# Patient Record
Sex: FEMALE | Race: WHITE | NOT HISPANIC OR LATINO | Employment: OTHER | URBAN - METROPOLITAN AREA
[De-identification: names, ages, dates, MRNs, and addresses within clinical notes are randomized per-mention and may not be internally consistent; named-entity substitution may affect disease eponyms.]

---

## 2017-02-22 ENCOUNTER — ALLSCRIPTS OFFICE VISIT (OUTPATIENT)
Dept: OTHER | Facility: OTHER | Age: 82
End: 2017-02-22

## 2017-02-22 DIAGNOSIS — H91.90 HEARING LOSS: ICD-10-CM

## 2017-04-05 ENCOUNTER — ALLSCRIPTS OFFICE VISIT (OUTPATIENT)
Dept: OTHER | Facility: OTHER | Age: 82
End: 2017-04-05

## 2017-10-02 ENCOUNTER — HOSPITAL ENCOUNTER (INPATIENT)
Facility: HOSPITAL | Age: 82
LOS: 4 days | Discharge: NURSING, REHAB OR LONG-TERM CARE | DRG: 641 | End: 2017-10-06
Attending: EMERGENCY MEDICINE | Admitting: FAMILY MEDICINE
Payer: MEDICARE

## 2017-10-02 ENCOUNTER — APPOINTMENT (EMERGENCY)
Dept: CT IMAGING | Facility: HOSPITAL | Age: 82
DRG: 641 | End: 2017-10-02
Payer: MEDICARE

## 2017-10-02 DIAGNOSIS — R19.7 DIARRHEA: Primary | ICD-10-CM

## 2017-10-02 PROBLEM — F17.200 TOBACCO DEPENDENCE: Chronic | Status: ACTIVE | Noted: 2017-10-02

## 2017-10-02 LAB
ALBUMIN SERPL BCP-MCNC: 3.1 G/DL (ref 3.5–5)
ALP SERPL-CCNC: 51 U/L (ref 46–116)
ALT SERPL W P-5'-P-CCNC: 18 U/L (ref 12–78)
ANION GAP SERPL CALCULATED.3IONS-SCNC: 8 MMOL/L (ref 4–13)
AST SERPL W P-5'-P-CCNC: 19 U/L (ref 5–45)
BASOPHILS # BLD AUTO: 0.06 THOUSANDS/ΜL (ref 0–0.1)
BASOPHILS NFR BLD AUTO: 1 % (ref 0–1)
BILIRUB SERPL-MCNC: 0.4 MG/DL (ref 0.2–1)
BUN SERPL-MCNC: 20 MG/DL (ref 5–25)
CALCIUM SERPL-MCNC: 8.7 MG/DL (ref 8.3–10.1)
CHLORIDE SERPL-SCNC: 103 MMOL/L (ref 100–108)
CO2 SERPL-SCNC: 29 MMOL/L (ref 21–32)
CREAT SERPL-MCNC: 1.04 MG/DL (ref 0.6–1.3)
EOSINOPHIL # BLD AUTO: 0.04 THOUSAND/ΜL (ref 0–0.61)
EOSINOPHIL NFR BLD AUTO: 1 % (ref 0–6)
ERYTHROCYTE [DISTWIDTH] IN BLOOD BY AUTOMATED COUNT: 15.4 % (ref 11.6–15.1)
GFR SERPL CREATININE-BSD FRML MDRD: 48 ML/MIN/1.73SQ M
GLUCOSE SERPL-MCNC: 94 MG/DL (ref 65–140)
HCT VFR BLD AUTO: 46.9 % (ref 34.8–46.1)
HGB BLD-MCNC: 15 G/DL (ref 11.5–15.4)
L PNEUMO1 AG UR QL IA.RAPID: NEGATIVE
LYMPHOCYTES # BLD AUTO: 0.85 THOUSANDS/ΜL (ref 0.6–4.47)
LYMPHOCYTES NFR BLD AUTO: 11 % (ref 14–44)
MCH RBC QN AUTO: 31.5 PG (ref 26.8–34.3)
MCHC RBC AUTO-ENTMCNC: 32 G/DL (ref 31.4–37.4)
MCV RBC AUTO: 99 FL (ref 82–98)
MONOCYTES # BLD AUTO: 0.52 THOUSAND/ΜL (ref 0.17–1.22)
MONOCYTES NFR BLD AUTO: 7 % (ref 4–12)
NEUTROPHILS # BLD AUTO: 6.39 THOUSANDS/ΜL (ref 1.85–7.62)
NEUTS SEG NFR BLD AUTO: 81 % (ref 43–75)
NRBC BLD AUTO-RTO: 0 /100 WBCS
PLATELET # BLD AUTO: 295 THOUSANDS/UL (ref 149–390)
PLATELET # BLD AUTO: 307 THOUSANDS/UL (ref 149–390)
PMV BLD AUTO: 10 FL (ref 8.9–12.7)
PMV BLD AUTO: 9.7 FL (ref 8.9–12.7)
POTASSIUM SERPL-SCNC: 4.5 MMOL/L (ref 3.5–5.3)
PROT SERPL-MCNC: 7.1 G/DL (ref 6.4–8.2)
RBC # BLD AUTO: 4.76 MILLION/UL (ref 3.81–5.12)
SODIUM SERPL-SCNC: 140 MMOL/L (ref 136–145)
WBC # BLD AUTO: 7.9 THOUSAND/UL (ref 4.31–10.16)

## 2017-10-02 PROCEDURE — 85025 COMPLETE CBC W/AUTO DIFF WBC: CPT | Performed by: PHYSICIAN ASSISTANT

## 2017-10-02 PROCEDURE — 93005 ELECTROCARDIOGRAM TRACING: CPT | Performed by: PHYSICIAN ASSISTANT

## 2017-10-02 PROCEDURE — 87505 NFCT AGENT DETECTION GI: CPT | Performed by: PHYSICIAN ASSISTANT

## 2017-10-02 PROCEDURE — 80053 COMPREHEN METABOLIC PANEL: CPT | Performed by: PHYSICIAN ASSISTANT

## 2017-10-02 PROCEDURE — 87449 NOS EACH ORGANISM AG IA: CPT | Performed by: PHYSICIAN ASSISTANT

## 2017-10-02 PROCEDURE — 96361 HYDRATE IV INFUSION ADD-ON: CPT

## 2017-10-02 PROCEDURE — 36415 COLL VENOUS BLD VENIPUNCTURE: CPT | Performed by: PHYSICIAN ASSISTANT

## 2017-10-02 PROCEDURE — 96360 HYDRATION IV INFUSION INIT: CPT

## 2017-10-02 PROCEDURE — 85049 AUTOMATED PLATELET COUNT: CPT | Performed by: PHYSICIAN ASSISTANT

## 2017-10-02 PROCEDURE — 74177 CT ABD & PELVIS W/CONTRAST: CPT

## 2017-10-02 RX ORDER — ACETAMINOPHEN 325 MG/1
650 TABLET ORAL EVERY 6 HOURS PRN
Status: DISCONTINUED | OUTPATIENT
Start: 2017-10-02 | End: 2017-10-06 | Stop reason: HOSPADM

## 2017-10-02 RX ORDER — CALCIUM CARBONATE 200(500)MG
1000 TABLET,CHEWABLE ORAL DAILY PRN
Status: DISCONTINUED | OUTPATIENT
Start: 2017-10-02 | End: 2017-10-06 | Stop reason: HOSPADM

## 2017-10-02 RX ORDER — DOCUSATE SODIUM 100 MG/1
100 CAPSULE, LIQUID FILLED ORAL 2 TIMES DAILY
Status: DISCONTINUED | OUTPATIENT
Start: 2017-10-02 | End: 2017-10-06 | Stop reason: HOSPADM

## 2017-10-02 RX ORDER — ONDANSETRON 2 MG/ML
4 INJECTION INTRAMUSCULAR; INTRAVENOUS EVERY 6 HOURS PRN
Status: DISCONTINUED | OUTPATIENT
Start: 2017-10-02 | End: 2017-10-06 | Stop reason: HOSPADM

## 2017-10-02 RX ORDER — SODIUM CHLORIDE 9 MG/ML
75 INJECTION, SOLUTION INTRAVENOUS CONTINUOUS
Status: DISCONTINUED | OUTPATIENT
Start: 2017-10-02 | End: 2017-10-06 | Stop reason: HOSPADM

## 2017-10-02 RX ADMIN — IOHEXOL 85 ML: 350 INJECTION, SOLUTION INTRAVENOUS at 17:38

## 2017-10-02 RX ADMIN — SODIUM CHLORIDE 75 ML/HR: 0.9 INJECTION, SOLUTION INTRAVENOUS at 23:22

## 2017-10-02 RX ADMIN — AZITHROMYCIN MONOHYDRATE 500 MG: 500 INJECTION, POWDER, LYOPHILIZED, FOR SOLUTION INTRAVENOUS at 19:03

## 2017-10-02 RX ADMIN — SODIUM CHLORIDE 500 ML: 0.9 INJECTION, SOLUTION INTRAVENOUS at 15:39

## 2017-10-02 NOTE — ED PROVIDER NOTES
History  Chief Complaint   Patient presents with    Diarrhea     Patient reports rapid bowel movements after eating, has progressively gotten worse over the weekend, patient states "it is as if I have no stomach, it goes right through me"     26-year-old female with past medical history significant for dementia and history of ovarian cyst presents to the emergency department with chief complaint of diarrhea  Onset of symptoms reportedly worse over the past 4 days but has been ongoing for the past 4 weeks intermittently  Quality is reported as watery diarrhea  Severity is reported as moderate  Associated symptoms:  Denies fevers  Denies abdominal pain  Denies nausea or vomiting  Denies melena  Modifying factors:  Patient reports eating exacerbate symptoms  Context:  Denies any recent sick contacts  Denies any recent travel outside the country  Denies any recent changes to her baseline medications  Patient's daughter reports that patient has had intermittent type symptoms for at least the past 4 weeks if not longer  She reports they have gotten worse over the past 4 days, specifically stating that patient now cannot get through a meal without having to immediately go to the bathroom at which time she experiences watery nonbloody diarrhea  Daughter reports patient had colonoscopy 5-6 years ago which showed some polyps - but patient also had complications with the colonoscopy and needed to spend 2 months in nursing home during which time she contracted c-dif infection  Daughter reports that symptoms are different than prior episode of cdif  Denies recent antibiotic use  She reports PCP prescribed an over the counter probiotic with no improvement in symptoms  Medical summary:  Review of past visit history via [employer] demonstrates the patient was last seen on April 5, 2017 by her PCP Dr Santiago Adjutant for routine health maintenance visit regarding hypertension, hearing loss and dementia   Details of HPI and ROS obtained from patient's daughter at bedside due to dementia  History provided by:  Patient and relative  History limited by:  Dementia   used: No    Diarrhea   Associated symptoms: no abdominal pain, no arthralgias, no chills, no diaphoresis, no fever, no headaches, no myalgias and no vomiting        None       Past Medical History:   Diagnosis Date    Ovarian cyst        Past Surgical History:   Procedure Laterality Date    COLONOSCOPY         History reviewed  No pertinent family history  I have reviewed and agree with the history as documented  Social History   Substance Use Topics    Smoking status: Current Every Day Smoker     Packs/day: 1 00     Types: Cigarettes    Smokeless tobacco: Never Used    Alcohol use No        Review of Systems   Constitutional: Negative for activity change, appetite change, chills, diaphoresis, fatigue and fever  HENT: Negative for congestion, dental problem, ear pain, facial swelling, nosebleeds, postnasal drip, rhinorrhea, sinus pressure, sneezing, sore throat and trouble swallowing  Eyes: Negative for photophobia, pain, discharge, redness and itching  Respiratory: Negative for cough, chest tightness, shortness of breath and wheezing  Cardiovascular: Negative for chest pain, palpitations and leg swelling  Gastrointestinal: Positive for diarrhea  Negative for abdominal distention, abdominal pain, anal bleeding, blood in stool, constipation, nausea, rectal pain and vomiting  Endocrine: Negative for cold intolerance, heat intolerance, polydipsia, polyphagia and polyuria  Genitourinary: Negative for decreased urine volume, difficulty urinating, dysuria, flank pain, frequency, hematuria and urgency  Musculoskeletal: Negative for arthralgias, back pain, gait problem, joint swelling, myalgias, neck pain and neck stiffness  Skin: Negative for color change, pallor, rash and wound     Allergic/Immunologic: Negative for environmental allergies, food allergies and immunocompromised state  Neurological: Negative for dizziness, tremors, seizures, syncope, facial asymmetry, speech difficulty, weakness, light-headedness, numbness and headaches  Hematological: Negative for adenopathy  Does not bruise/bleed easily  Psychiatric/Behavioral: Negative for agitation, decreased concentration and hallucinations  All other systems reviewed and are negative  Physical Exam  ED Triage Vitals [10/02/17 1418]   Temperature Pulse Respirations Blood Pressure SpO2   97 7 °F (36 5 °C) 80 18 103/59 97 %      Temp Source Heart Rate Source Patient Position - Orthostatic VS BP Location FiO2 (%)   Oral Monitor Sitting Left arm --      Pain Score       No Pain           Physical Exam   Constitutional: She is oriented to person, place, and time  She appears well-developed and well-nourished  No distress  /59   Pulse 80   Temp 97 7 °F (36 5 °C) (Oral)   Resp 18   Ht 5' (1 524 m)   Wt 34 kg (75 lb)   SpO2 97%   BMI 14 65 kg/m²   Interpretation normal, patient observed   HENT:   Head: Normocephalic and atraumatic  Right Ear: External ear normal    Left Ear: External ear normal    Nose: Nose normal    Mouth/Throat: Oropharynx is clear and moist  No oropharyngeal exudate  Eyes: Conjunctivae and EOM are normal  Right eye exhibits no discharge  Left eye exhibits no discharge  No scleral icterus  Neck: Normal range of motion  Neck supple  No JVD present  No tracheal deviation present  Cardiovascular: Normal rate, regular rhythm and intact distal pulses  Pulmonary/Chest: Effort normal and breath sounds normal  No respiratory distress  She has no wheezes  She exhibits no tenderness  Abdominal: Soft  Bowel sounds are normal  She exhibits no distension and no mass  There is no tenderness  There is no rebound and no guarding  No hernia  Musculoskeletal: Normal range of motion  She exhibits no edema, tenderness or deformity     Lymphadenopathy: She has no cervical adenopathy  Neurological: She is alert and oriented to person, place, and time  No cranial nerve deficit  She exhibits normal muscle tone  Coordination normal    Skin: Skin is warm and dry  Capillary refill takes less than 2 seconds  No rash noted  She is not diaphoretic  No erythema  No pallor  Psychiatric: She has a normal mood and affect  Her behavior is normal    Nursing note and vitals reviewed        ED Medications  Medications   sodium chloride 0 9 % infusion (75 mL/hr Intravenous Restarted 10/2/17 2343)   docusate sodium (COLACE) capsule 100 mg (100 mg Oral Not Given 10/2/17 2330)   ondansetron (ZOFRAN) injection 4 mg (not administered)   calcium carbonate (TUMS) chewable tablet 1,000 mg (not administered)   enoxaparin (LOVENOX) subcutaneous injection 30 mg (not administered)   acetaminophen (TYLENOL) tablet 650 mg (not administered)   azithromycin (ZITHROMAX) 500 mg in sodium chloride 0 9% 250mL IVPB 500 mg (not administered)   sodium chloride 0 9 % bolus 500 mL (0 mL Intravenous Stopped 10/2/17 1728)   iohexol (OMNIPAQUE) 350 MG/ML injection (MULTI-DOSE) 100 mL (85 mL Intravenous Given 10/2/17 1738)   azithromycin (ZITHROMAX) 500 mg in sodium chloride 0 9% 250mL IVPB 500 mg (0 mg Intravenous Stopped 10/2/17 2032)       Diagnostic Studies  Labs Reviewed   CBC AND DIFFERENTIAL - Abnormal        Result Value Ref Range Status    Hematocrit 46 9 (*) 34 8 - 46 1 % Final    MCV 99 (*) 82 - 98 fL Final    RDW 15 4 (*) 11 6 - 15 1 % Final    Neutrophils Relative 81 (*) 43 - 75 % Final    Lymphocytes Relative 11 (*) 14 - 44 % Final    WBC 7 90  4 31 - 10 16 Thousand/uL Final    RBC 4 76  3 81 - 5 12 Million/uL Final    Hemoglobin 15 0  11 5 - 15 4 g/dL Final    MCH 31 5  26 8 - 34 3 pg Final    MCHC 32 0  31 4 - 37 4 g/dL Final    MPV 9 7  8 9 - 12 7 fL Final    Platelets 955  820 - 390 Thousands/uL Final    nRBC 0  /100 WBCs Final    Monocytes Relative 7  4 - 12 % Final    Eosinophils Relative 1  0 - 6 % Final    Basophils Relative 1  0 - 1 % Final    Neutrophils Absolute 6 39  1 85 - 7 62 Thousands/µL Final    Lymphocytes Absolute 0 85  0 60 - 4 47 Thousands/µL Final    Monocytes Absolute 0 52  0 17 - 1 22 Thousand/µL Final    Eosinophils Absolute 0 04  0 00 - 0 61 Thousand/µL Final    Basophils Absolute 0 06  0 00 - 0 10 Thousands/µL Final   COMPREHENSIVE METABOLIC PANEL - Abnormal     Albumin 3 1 (*) 3 5 - 5 0 g/dL Final    Sodium 140  136 - 145 mmol/L Final    Potassium 4 5  3 5 - 5 3 mmol/L Final    Chloride 103  100 - 108 mmol/L Final    CO2 29  21 - 32 mmol/L Final    Anion Gap 8  4 - 13 mmol/L Final    BUN 20  5 - 25 mg/dL Final    Creatinine 1 04  0 60 - 1 30 mg/dL Final    Comment: Standardized to IDMS reference method    Glucose 94  65 - 140 mg/dL Final    Comment:   If the patient is fasting, the ADA then defines impaired fasting glucose as > 100 mg/dL and diabetes as > or equal to 123 mg/dL  Specimen collection should occur prior to Sulfasalazine administration due to the potential for falsely depressed results  Specimen collection should occur prior to Sulfapyridine administration due to the potential for falsely elevated results  Calcium 8 7  8 3 - 10 1 mg/dL Final    AST 19  5 - 45 U/L Final    Comment:   Specimen collection should occur prior to Sulfasalazine administration due to the potential for falsely depressed results  ALT 18  12 - 78 U/L Final    Comment:   Specimen collection should occur prior to Sulfasalazine administration due to the potential for falsely depressed results  Alkaline Phosphatase 51  46 - 116 U/L Final    Total Protein 7 1  6 4 - 8 2 g/dL Final    Total Bilirubin 0 40  0 20 - 1 00 mg/dL Final    eGFR 48  ml/min/1 73sq m Final    Narrative:     National Kidney Disease Education Program recommendations are as follows:  GFR calculation is accurate only with a steady state creatinine  Chronic Kidney disease less than 60 ml/min/1 73 sq  meters  Kidney failure less than 15 ml/min/1 73 sq  meters  LEGIONELLA ANTIGEN, URINE - Normal    Legionella Urinary Antigen Negative  Negative Final   PLATELET COUNT - Normal    Platelets 557  390 - 390 Thousands/uL Final    MPV 10 0  8 9 - 12 7 fL Final   STOOL ENTERIC BACTERIAL PANEL BY PCR   CLOSTRIDIUM DIFFICILE TOXIN BY PCR       CT abdomen pelvis with contrast   Final Result   1  Bibasilar reticulonodular opacities and scattered regions of bronchiectasis suspicious for acute infectious or inflammatory process  Correlate clinically and recommend follow-up to resolution  2   No acute bowel findings  3   There is a left adrenal nodule measuring 1 6 x 1 4 cm  Although its imaging features are indeterminate, it does not have suspicious imaging features (heterogeneity, necrosis, irregular margins), therefore this is likely benign, and can be followed by    non-contrast abdomen CT or MRI in 12 months  If patient has history of adrenal hyperfunction, consider biochemical evaluation  Recommendation based on institutional consensus and [address] [place],[address] of Radiology 0177;[phone]         ##[id number]##sigslh      ##[id number]##[email or web address]         Workstation performed: [id number]             Procedures  Procedures      Phone Contacts  ED Phone Contact    ED Course  ED Course                                MDM  Number of Diagnoses or Management Options  Diagnosis management comments: ddx includes but is not limited to infectious diarrhea, inflammatory bowel disease, gastroenteritis, gastritis, malabsorption syndrome  Dehydration, renal failure, electrolyte abnormality, colitis, diverticulitis, plan check labs, check ct abd/pelvis to evaluate for diverticulitis          Amount and/or Complexity of Data Reviewed  Clinical lab tests: ordered and reviewed  Tests in the radiology section of CPT®: ordered and reviewed  Tests in the medicine section of CPT®: ordered and reviewed  Discussion of test results with the performing providers: yes  Decide to obtain previous medical records or to obtain history from someone other than the patient: yes (daughter)  Obtain history from someone other than the patient: yes (daughter)  Review and summarize past medical records: yes  Discuss the patient with other providers: yes  Independent visualization of images, tracings, or specimens: yes    Risk of Complications, Morbidity, and/or Mortality  General comments: Lab results reviewed CBC demonstrates normal white blood cell count 7 9  Hemoglobin of 15 is normal   Hematocrit 46 9 is elevated  Metabolic panel was reviewed demonstrates only albumin of 3 1 which is mildly low  1700 change in status - patient reports feeling lightheaded  Vital signs reviewed - pulse mildly bradycardic in high 50s  Blood pressure hypertensive   ekg performed  ECG 12 Lead Documentation  Date/Time: oct 2, 2017, 1653  Performed by: Karla Worley  Authorized by: Shauna Mansfield    Indications / Diagnosis:  lightheadedness  ECG reviewed by me, the ED Provider: yes    Patient location:  ED  Previous ECG:     Comparison to cardiac monitor: Yes    Interpretation:     Interpretation: normal    Rate:     ECG rate:  63    ECG rate assessment: normal    Rhythm:     Rhythm: sinus rhythm    Ectopy:     Ectopy: none    QRS:     QRS axis:  Normal    QRS intervals:  Normal  Conduction:     Conduction: LAFB    ST segments:     ST segments:  Normal  T waves:     T waves: normal     no previous ekg available for comparison  Ct abd/pelvis radiology report reviewed: 1   Bibasilar reticulonodular opacities and scattered regions of bronchiectasis suspicious for acute infectious or inflammatory process   Correlate clinically and recommend follow-up to resolution  2   No acute bowel findings  3  Gelene Sit is a left adrenal nodule measuring 1 6 x 1 4 cm   Although its imaging features are indeterminate, it does not have suspicious imaging features (heterogeneity, necrosis, irregular margins), therefore this is likely benign, and can be followed by   non-contrast abdomen CT or MRI in 12 months   If patient has history of adrenal hyperfunction, consider biochemical evaluation  I reviewed all test results with patient's daughter at bedside  Given patient's age and dementia and transient bradycardia recommend admission  Patient's daughter reports that a few years ago patient was told that they were concerned that she may have tb like infection but daughter believes patient had negative tb workup  Patient is a smoker  Given diarrhea, transient bradycardia and reticulonodular inflammatory changes concerning for acute infectious process question if patient may have mycoplasma or possible legionella  Case discussed with Dr Cathy Harper, [facility] hospitalist regarding admission  Will dose with macrolide for coverage of atypical pneumonia, urine legionella antigen ordered  Will avoid quinolones given patient's age and concerns for worsening dementia  Patient Progress  Patient progress: stable    CritCare Time    Disposition  Final diagnoses:   None     ED Disposition     None      Follow-up Information    None       Patient's Medications    No medications on file     No discharge procedures on file      ED Provider  Electronically Signed by       Jesus Alberto Nava PA-C  10/03/17 9466

## 2017-10-02 NOTE — ED NOTES
Pt with c/o feeling "like i'm going to pass out " 12 lead EKG obtained  HANS burkett notified  Vital signs as documented          Saray Vásquez RN  10/02/17 0673

## 2017-10-03 LAB
ANION GAP SERPL CALCULATED.3IONS-SCNC: 5 MMOL/L (ref 4–13)
ATRIAL RATE: 63 BPM
BUN SERPL-MCNC: 17 MG/DL (ref 5–25)
CALCIUM SERPL-MCNC: 8.3 MG/DL (ref 8.3–10.1)
CAMPYLOBACTER DNA SPEC NAA+PROBE: NORMAL
CHLORIDE SERPL-SCNC: 107 MMOL/L (ref 100–108)
CO2 SERPL-SCNC: 29 MMOL/L (ref 21–32)
CREAT SERPL-MCNC: 0.92 MG/DL (ref 0.6–1.3)
ERYTHROCYTE [DISTWIDTH] IN BLOOD BY AUTOMATED COUNT: 15.5 % (ref 11.6–15.1)
GFR SERPL CREATININE-BSD FRML MDRD: 56 ML/MIN/1.73SQ M
GLUCOSE SERPL-MCNC: 82 MG/DL (ref 65–140)
HCT VFR BLD AUTO: 41.2 % (ref 34.8–46.1)
HGB BLD-MCNC: 13.3 G/DL (ref 11.5–15.4)
MCH RBC QN AUTO: 31.8 PG (ref 26.8–34.3)
MCHC RBC AUTO-ENTMCNC: 32.3 G/DL (ref 31.4–37.4)
MCV RBC AUTO: 99 FL (ref 82–98)
P AXIS: 75 DEGREES
PLATELET # BLD AUTO: 296 THOUSANDS/UL (ref 149–390)
PMV BLD AUTO: 9.8 FL (ref 8.9–12.7)
POTASSIUM SERPL-SCNC: 4 MMOL/L (ref 3.5–5.3)
PR INTERVAL: 142 MS
QRS AXIS: -45 DEGREES
QRSD INTERVAL: 82 MS
QT INTERVAL: 448 MS
QTC INTERVAL: 458 MS
RBC # BLD AUTO: 4.18 MILLION/UL (ref 3.81–5.12)
SALMONELLA DNA SPEC QL NAA+PROBE: NORMAL
SHIGA TOXIN STX GENE SPEC NAA+PROBE: NORMAL
SHIGELLA DNA SPEC QL NAA+PROBE: NORMAL
SODIUM SERPL-SCNC: 141 MMOL/L (ref 136–145)
T WAVE AXIS: 84 DEGREES
VENTRICULAR RATE: 63 BPM
WBC # BLD AUTO: 9.54 THOUSAND/UL (ref 4.31–10.16)

## 2017-10-03 PROCEDURE — 87493 C DIFF AMPLIFIED PROBE: CPT | Performed by: PHYSICIAN ASSISTANT

## 2017-10-03 PROCEDURE — 85027 COMPLETE CBC AUTOMATED: CPT | Performed by: PHYSICIAN ASSISTANT

## 2017-10-03 PROCEDURE — 99285 EMERGENCY DEPT VISIT HI MDM: CPT

## 2017-10-03 PROCEDURE — 36415 COLL VENOUS BLD VENIPUNCTURE: CPT | Performed by: PHYSICIAN ASSISTANT

## 2017-10-03 PROCEDURE — 80048 BASIC METABOLIC PNL TOTAL CA: CPT | Performed by: PHYSICIAN ASSISTANT

## 2017-10-03 RX ORDER — DONEPEZIL HYDROCHLORIDE 10 MG/1
10 TABLET, FILM COATED ORAL DAILY
COMMUNITY

## 2017-10-03 RX ORDER — AMLODIPINE BESYLATE 5 MG/1
5 TABLET ORAL DAILY
COMMUNITY

## 2017-10-03 RX ORDER — SENNOSIDES 8.6 MG
650 CAPSULE ORAL EVERY 8 HOURS PRN
COMMUNITY
End: 2020-03-20 | Stop reason: HOSPADM

## 2017-10-03 RX ORDER — SACCHAROMYCES BOULARDII 250 MG
250 CAPSULE ORAL 2 TIMES DAILY
Status: DISCONTINUED | OUTPATIENT
Start: 2017-10-03 | End: 2017-10-06 | Stop reason: HOSPADM

## 2017-10-03 RX ORDER — AZITHROMYCIN 250 MG/1
250 TABLET, FILM COATED ORAL EVERY 24 HOURS
Status: DISCONTINUED | OUTPATIENT
Start: 2017-10-03 | End: 2017-10-06 | Stop reason: HOSPADM

## 2017-10-03 RX ORDER — SERTRALINE HYDROCHLORIDE 25 MG/1
25 TABLET, FILM COATED ORAL DAILY
COMMUNITY

## 2017-10-03 RX ADMIN — AZITHROMYCIN 250 MG: 250 TABLET, FILM COATED ORAL at 12:30

## 2017-10-03 RX ADMIN — Medication 250 MG: at 20:22

## 2017-10-03 RX ADMIN — ENOXAPARIN SODIUM 30 MG: 30 INJECTION SUBCUTANEOUS at 09:48

## 2017-10-03 RX ADMIN — SODIUM CHLORIDE 75 ML/HR: 0.9 INJECTION, SOLUTION INTRAVENOUS at 12:53

## 2017-10-03 NOTE — ED NOTES
Pt Provided with lunch box, pudding, and apple sauce  Pt set up to eat        Alber Neff RN  10/02/17 2022

## 2017-10-03 NOTE — CASE MANAGEMENT
[address] [facility] in the [facility] by Daljit Arvizu for 2017  Network Utilization Review Department  Phone: [phone]; Fax [phone]  ATTENTION: The Network Utilization Review Department is now centralized for our 7 Facilities  Make a note that we have a new phone and fax numbers for our Department  Please call with any questions or concerns to [phone] and carefully follow the prompts so that you are directed to the right person  All voicemails are confidential  Fax any determinations, approvals, denials, and requests for initial or continue stay review clinical to [phone]  Due to HIGH CALL volume, it would be easier if you could please send faxed requests to expedite your requests and in part, help us provide discharge notifications faster  Initial Clinical Review    Admission: Date/Time/Statement: 10/2/17 @ 1852     Orders Placed This Encounter   Procedures    Inpatient Admission (expected length of stay for this patient is greater than two midnights)     Standing Status:   Standing     Number of Occurrences:   1     Order Specific Question:   Admitting Physician     Answer:   Remington Rodrigues [[id number]]     Order Specific Question:   Level of Care     Answer:   Med Surg [[id number]]     Order Specific Question:   Estimated length of stay     Answer:   More than 2 Midnights     Order Specific Question:   Certification     Answer:   I certify that inpatient services are medically necessary for this patient for a duration of greater than two midnights  See H&P and MD Progress Notes for additional information about the patient's course of treatment           ED: Date/Time/Mode of Arrival:   ED Arrival Information     Expected Arrival Acuity Means of Arrival Escorted By Service Admission Type    - 10/2/2017 14:09 Urgent Walk-In Family Member General Medicine Urgent    Arrival Complaint    WEAK/MULTI SYMPOTMS          Chief Complaint:   Chief Complaint Patient presents with    Diarrhea     Patient reports rapid bowel movements after eating, has progressively gotten worse over the weekend, patient states "it is as if I have no stomach, it goes right through me"       History of Illness: Wil Robledo is a 80 y o  female who presents with complaints of diarrhea for the past 3 days  Pt states that she has diarrhea after she eats and only after she eats  Pt denies fever, chills, abdominal pain, nausea, vomiting  Pt denies blood in stool  ED Vital Signs:   ED Triage Vitals [10/02/17 1418]   Temperature Pulse Respirations Blood Pressure SpO2   97 7 °F (36 5 °C) 80 18 103/59 97 %      Temp Source Heart Rate Source Patient Position - Orthostatic VS BP Location FiO2 (%)   Oral Monitor Sitting Left arm --      Pain Score       No Pain        Wt Readings from Last 1 Encounters:   10/02/17 34 kg (75 lb)       Vital Signs (abnormal): wnl    Abnormal Labs/Diagnostic Test Results: alb   3 1, H&H  15 0 46 9   CT abd- 1   Bibasilar reticulonodular opacities and scattered regions of bronchiectasis suspicious for acute infectious or inflammatory process   Correlate clinically and recommend follow-up to resolution  2   No acute bowel findings  3  Elsie Quitter is a left adrenal nodule measuring 1 6 x 1 4 cm   Although its imaging features are indeterminate, it does not have suspicious imaging features (heterogeneity, necrosis, irregular margins), therefore this is likely benign, and can be followed by    ED Treatment:   Medication Administration from 10/02/2017 1409 to 10/03/2017 1114       Date/Time Order Dose Route Action Action by Comments     10/02/2017 1728 sodium chloride 0 9 % bolus 500 mL 0 mL Intravenous Stopped Collin Mcintyre RN      10/02/2017 1539 sodium chloride 0 9 % bolus 500 mL 500 mL Intravenous 100 [facility] Geraldine Muller RN      10/02/2017 1738 iohexol (OMNIPAQUE) 350 MG/ML injection (MULTI-DOSE) 100 mL 85 mL Intravenous Given Yuko Jones      10/02/2017 2032 azithromycin (ZITHROMAX) 500 mg in sodium chloride 0 9% 250mL IVPB 500 mg 0 mg Intravenous Stopped Vineetalexandremele Ferrara RN      10/02/2017 1903 azithromycin (ZITHROMAX) 500 mg in sodium chloride 0 9% 250mL IVPB 500 mg 500 mg Intravenous 100 [facility] Raman PATRICK Avendaño      10/02/2017 2343 sodium chloride 0 9 % infusion 75 mL/hr Intravenous Restarted Mars Savage RN      10/02/2017 2322 sodium chloride 0 9 % infusion 75 mL/hr Intravenous [address] [facility]      10/03/2017 1468 docusate sodium (COLACE) capsule 100 mg 100 mg Oral Not Given Miriam Flannery RN      10/02/2017 2330 docusate sodium (COLACE) capsule 100 mg 100 mg Oral Not Given Mars Savage RN      10/03/2017 2896 enoxaparin (LOVENOX) subcutaneous injection 30 mg 30 mg Subcutaneous Given Miriam Flannery RN           Past Medical/Surgical History: Active Ambulatory Problems     Diagnosis Date Noted    No Active Ambulatory Problems     Resolved Ambulatory Problems     Diagnosis Date Noted    No Resolved Ambulatory Problems     Past Medical History:   Diagnosis Date    Ovarian cyst        Admitting Diagnosis: Diarrhea [R19 7]    Age/Sex: 80 y o  female    Assessment/Plan: Hospital Problem List:      Principal Problem:    Diarrhea  Active Problems:    Tobacco dependence   Plan for the Primary Problem(s):  · Diarrhea  ? Admit  ? IV fluids  ? C diff culture pending  ? Legionella culture pending  ? Azithromycin    Plan for Additional Problems:   · Tobacco dependence - pt declined nicotine patch   VTE Prophylaxis: Enoxaparin (Lovenox)  / sequential compression device   Code Status: Full  POLST: There is no POLST form on file for this patient (pre-hospital)   Anticipated Length of Stay:  Patient will be admitted on an Inpatient basis with an anticipated length of stay of  More than 2 midnights     Justification for Hospital Stay: IV fluids    Admission Orders:  Scheduled Meds:   azithromycin 500 mg Intravenous Q24H   docusate sodium 100 mg Oral BID   enoxaparin 30 mg Subcutaneous Daily     Continuous Infusions:   sodium chloride 75 mL/hr Last Rate: 75 mL/hr (10/02/17 2216)     PRN Meds:   acetaminophen    calcium carbonate    ondansetron    Reg diet   Up w/ assist   SCD  Tele   10/3 cbc,bmp

## 2017-10-03 NOTE — CONSULTS
Consultation - [facility]) [facility]  Lainamandy Jaleel 80 y o  female MRN: [id number]  Unit/Bed#: ED 6 Encounter: [id number]        Consults    Reason for Consult / Principal Problem: Diarrhea    HPI: Ms Shane Jarquin is a 79 yo F with a PMH of tobacco abuse, presenting with her daughter due to worsening diarrhea and incontinence over the past 3-5 days  The patient reports no problems but her daughter noticed significant diarrhea with accidents over the weekend and was concerned for infection  She may have had Cdiff a few years ago  She denies abdominal pain, nausea, vomiting, melena, or hematochezia  She denies recent antibiotic use, travel, hospitalizations, or sick contacts  She lives with her daughter not in a healthcare facility  She denies fevers at home  Her last colonoscopy was around age [de-identified] with multiple polyps removed, no other significant findings per the daughter  The patient's daughter states she has always been thin weighing less than 100 lbs, recently lost a few lbs unintentionally  REVIEW OF SYSTEMS: Negative except for as stated above    Historical Information   Past Medical History:   Diagnosis Date    Ovarian cyst      Past Surgical History:   Procedure Laterality Date    COLONOSCOPY       Social History   History   Alcohol Use No     History   Drug Use No     History   Smoking Status    Current Every Day Smoker    Packs/day: 1 00    Types: Cigarettes   Smokeless Tobacco    Never Used     History reviewed  No pertinent family history      Meds/Allergies       (Not in a hospital admission)  Current Facility-Administered Medications   Medication Dose Route Frequency    acetaminophen (TYLENOL) tablet 650 mg  650 mg Oral Q6H PRN    azithromycin (ZITHROMAX) tablet 250 mg  250 mg Oral Q24H    calcium carbonate (TUMS) chewable tablet 1,000 mg  1,000 mg Oral Daily PRN    docusate sodium (COLACE) capsule 100 mg  100 mg Oral BID    enoxaparin (LOVENOX) subcutaneous injection 30 mg  30 mg Subcutaneous Daily    ondansetron (ZOFRAN) injection 4 mg  4 mg Intravenous Q6H PRN    sodium chloride 0 9 % infusion  75 mL/hr Intravenous Continuous       Allergies   Allergen Reactions    Penicillins            Objective     Blood pressure 116/51, pulse 63, temperature 98 1 °F (36 7 °C), temperature source Oral, resp  rate 18, height 5' (1 524 m), weight 34 kg (75 lb), SpO2 93 %  No intake or output data in the 24 hours ending 10/03/17 1656      PHYSICAL EXAM:      General Appearance:   Alert, cooperative, no distress, (+) cachectic appearing   HEENT:   Normocephalic, atraumatic, anicteric   Lungs:   Clear to auscultation bilaterally   Heart[de-identified]   RRR, no murmur   Abdomen:   Soft, non-tender, non-distended; normal bowel sounds   Rectal:   Deferred    Extremities:  No edema    Skin:  No jaundice or pallor     Lab Results:     Results from last 7 days  Lab Units 10/03/17  0431  10/02/17  1537   WBC Thousand/uL 9 54  --  7 90   HEMOGLOBIN g/dL 13 3  --  15 0   HEMATOCRIT % 41 2  --  46 9*   PLATELETS Thousands/uL 296  < > 307   NEUTROS PCT %  --   --  81*   LYMPHS PCT %  --   --  11*   MONOS PCT %  --   --  7   EOS PCT %  --   --  1   < > = values in this interval not displayed  Results from last 7 days  Lab Units 10/03/17  0431 10/02/17  1537   SODIUM mmol/L 141 140   POTASSIUM mmol/L 4 0 4 5   CHLORIDE mmol/L 107 103   CO2 mmol/L 29 29   BUN mg/dL 17 20   CREATININE mg/dL 0 92 1 04   CALCIUM mg/dL 8 3 8 7   TOTAL PROTEIN g/dL  --  7 1   BILIRUBIN TOTAL mg/dL  --  0 40   ALK PHOS U/L  --  51   ALT U/L  --  18   AST U/L  --  19   GLUCOSE RANDOM mg/dL 82 94               Imaging Studies: I have personally reviewed pertinent imaging studies  Ct Abdomen Pelvis With Contrast  Result Date: 10/2/2017  Impression: 1  Bibasilar reticulonodular opacities and scattered regions of bronchiectasis suspicious for acute infectious or inflammatory process  Correlate clinically and recommend follow-up to resolution   2  No acute bowel findings  3   There is a left adrenal nodule measuring 1 6 x 1 4 cm  Although its imaging features are indeterminate, it does not have suspicious imaging features (heterogeneity, necrosis, irregular margins), therefore this is likely benign, and can be followed by  non-contrast abdomen CT or MRI in 12 months  If patient has history of adrenal hyperfunction, consider biochemical evaluation  ASSESSMENT and PLAN:      Diarrhea  - Unclear etiology; no alarms symptoms such as significant weight loss, bloody stools  - CT on admission with no evidence of colitis or fecal impaction; she has no fever or leukocytosis, no evidence for dehydration  - Stool culture neg, check Cdiff due to her possible history of Cdiff   - Other etiologies could be functional diarrhea v microscopic colitis  - Diet as tolerated, recommend lactose free due to diarrhea  - Start probiotic  - If diarrhea improves and Cdiff is negative, may be stable for discharge from GI standpoint tomorrow with outpatient follow up      Patient was seen and examined by Dr Arlin Morel  All soto medical decisions were made by Dr Arlin Morel  Thank you for allowing us to participate in the care of this patient  We will follow with you closely

## 2017-10-03 NOTE — H&P
History and Physical - [facility] Medicine    Patient Information: Salas Garcia 80 y o  female MRN: [id number]  Unit/Bed#: ED 11 Encounter: [id number]  Admitting Physician: Amarilis Roper PA-C  PCP: Crispin Chan MD  Date of Admission:  10/02/17    Assessment/Plan:    Hospital Problem List:     Principal Problem:    Diarrhea  Active Problems:    Tobacco dependence      Plan for the Primary Problem(s):  · Diarrhea  · Admit  · IV fluids  · C diff culture pending  · Legionella culture pending  ·  Azithromycin     Plan for Additional Problems:   · Tobacco dependence - pt declined nicotine patch    VTE Prophylaxis: Enoxaparin (Lovenox)  / sequential compression device   Code Status: Full  POLST: There is no POLST form on file for this patient (pre-hospital)    Anticipated Length of Stay:  Patient will be admitted on an Inpatient basis with an anticipated length of stay of  More than 2 midnights  Justification for Hospital Stay: IV fluids    Total Time for Visit, including Counseling / Coordination of Care: 30 minutes  Greater than 50% of this total time spent on direct patient counseling and coordination of care  Chief Complaint:   Diarrhea    History of Present Illness: Salas Garcia is a 80 y o  female who presents with complaints of diarrhea for the past 3 days  Pt states that she has diarrhea after she eats and only after she eats  Pt denies fever, chills, abdominal pain, nausea, vomiting  Pt denies blood in stool  Review of Systems:    Review of Systems   Gastrointestinal: Positive for diarrhea  All other systems reviewed and are negative  Past Medical and Surgical History:     Past Medical History:   Diagnosis Date    Ovarian cyst        Past Surgical History:   Procedure Laterality Date    COLONOSCOPY         Meds/Allergies:    Prior to Admission medications    Not on File     I have reviewed home medications with patient personally  Allergies:    Allergies   Allergen Reactions    Penicillins        Social History:     Marital Status:    Occupation: retired  Patient Pre-hospital Living Situation: lives at home alone  Patient Pre-hospital Level of Mobility: ambulatory  Patient Pre-hospital Diet Restrictions: none  Substance Use History:   History   Alcohol Use No     History   Smoking Status    Current Every Day Smoker    Packs/day: 1 00    Types: Cigarettes   Smokeless Tobacco    Never Used     History   Drug Use No       Family History:    History reviewed  No pertinent family history  Physical Exam:     Vitals:   Blood Pressure: 156/67 (10/02/17 1700)  Pulse: 75 (10/02/17 2015)  Temperature: 97 7 °F (36 5 °C) (10/02/17 1418)  Temp Source: Oral (10/02/17 1418)  Respirations: 21 (10/02/17 2015)  Height: 5' (152 4 cm) (10/02/17 1418)  Weight - Scale: 34 kg (75 lb) (10/02/17 1418)  SpO2: 97 % (10/02/17 2015)    Physical Exam   Constitutional: She is oriented to person, place, and time  She appears well-developed and well-nourished  HENT:   Head: Normocephalic and atraumatic  Right Ear: External ear normal    Left Ear: External ear normal    Nose: Nose normal    Mouth/Throat: Oropharynx is clear and moist    Eyes: Conjunctivae and EOM are normal  Pupils are equal, round, and reactive to light  Neck: Normal range of motion  Cardiovascular: Normal rate, regular rhythm and normal heart sounds  Pulmonary/Chest: Effort normal and breath sounds normal    Abdominal: Soft  Bowel sounds are normal    Musculoskeletal: Normal range of motion  Neurological: She is alert and oriented to person, place, and time  Skin: Skin is warm and dry  Psychiatric: She has a normal mood and affect  Her behavior is normal  Judgment and thought content normal    Vitals reviewed  Additional Data:     Lab Results: I have personally reviewed pertinent reports          Results from last 7 days  Lab Units 10/02/17  1537   WBC Thousand/uL 7 90   HEMOGLOBIN g/dL 15 0   HEMATOCRIT % 46 9*   PLATELETS Thousands/uL 307   NEUTROS PCT % 81*   LYMPHS PCT % 11*   MONOS PCT % 7   EOS PCT % 1       Results from last 7 days  Lab Units 10/02/17  1537   SODIUM mmol/L 140   POTASSIUM mmol/L 4 5   CHLORIDE mmol/L 103   CO2 mmol/L 29   BUN mg/dL 20   CREATININE mg/dL 1 04   CALCIUM mg/dL 8 7   TOTAL PROTEIN g/dL 7 1   BILIRUBIN TOTAL mg/dL 0 40   ALK PHOS U/L 51   ALT U/L 18   AST U/L 19   GLUCOSE RANDOM mg/dL 94           Imaging: I have personally reviewed pertinent reports  Ct Abdomen Pelvis With Contrast    Result Date: 10/2/2017  Narrative: CT ABDOMEN AND PELVIS WITH IV CONTRAST INDICATION:  Diarrhea  Abdominal pain  COMPARISON: None  TECHNIQUE:  CT examination of the abdomen and pelvis was performed  Reformatted images were created in axial, sagittal, and coronal planes  Radiation dose length product (DLP) for this visit:  520 mGy-cm   This examination, like all CT scans performed in the [facility], was performed utilizing techniques to minimize radiation dose exposure, including the use of iterative reconstruction and automated exposure control  IV Contrast:  85 mL of iohexol (OMNIPAQUE)     Enteric Contrast:  Enteric contrast was not administered  FINDINGS: ABDOMEN LOWER CHEST:  Prominent reticular nodular densities noted at both lung bases with regions of bronchiectasis  Findings are predominantly in a peripheral distribution  Consider infectious or inflammatory process  Visualized cardiac structures appear intact with pectus excavatum deformity noted  LIVER/BILIARY TREE:  Unremarkable  GALLBLADDER:  No calcified gallstones  No pericholecystic inflammatory change  SPLEEN:  Unremarkable  PANCREAS:  Unremarkable  ADRENAL GLANDS:  Left adrenal nodule measuring 1 6 x 1 4 cm  KIDNEYS/URETERS:  Left parapelvic cyst formation noted  No hydronephrosis or obstructing lesion  STOMACH AND BOWEL:  Abdominal pelvic surgical clips noted fairly extensively   APPENDIX:  No findings to suggest appendicitis  ABDOMINOPELVIC CAVITY:  No ascites or free intraperitoneal air  No lymphadenopathy  VESSELS:  Unremarkable for patient's age  PELVIS REPRODUCTIVE ORGANS:  Unremarkable for patient's age  URINARY BLADDER:  Unremarkable  ABDOMINAL WALL/INGUINAL REGIONS:  Unremarkable  OSSEOUS STRUCTURES:  No acute fracture or destructive osseous lesion  Impression: 1  Bibasilar reticulonodular opacities and scattered regions of bronchiectasis suspicious for acute infectious or inflammatory process  Correlate clinically and recommend follow-up to resolution  2   No acute bowel findings  3   There is a left adrenal nodule measuring 1 6 x 1 4 cm  Although its imaging features are indeterminate, it does not have suspicious imaging features (heterogeneity, necrosis, irregular margins), therefore this is likely benign, and can be followed by  non-contrast abdomen CT or MRI in 12 months  If patient has history of adrenal hyperfunction, consider biochemical evaluation  Recommendation based on institutional consensus and [address] of Radiology [phone] ##[id number]##sigslh ##[id number]##[id number] Workstation performed: [id number]       EKG, Pathology, and Other Studies Reviewed on Admission:   · EKG: none    Allscripts / Epic Records Reviewed: Yes     ** Please Note: This note has been constructed using a voice recognition system   **

## 2017-10-03 NOTE — PROGRESS NOTES
[facility] Internal Medicine Progress Note  Patient: Olivier Martinez 80 y o  female   MRN: [id number]  PCP: Kathy Gottlieb MD  Unit/Bed#: ED 11 Encounter: [id number]  Date Of Visit: 10/03/17    Assessment:    Principal Problem:    Diarrhea  Active Problems:    Tobacco dependence   Cough with possible bronchitis    Plan:    · Diarrhea with history of C diff  We will follow up with the cultures  We will have GI evaluate the patient  Will hydrate the patient her head dehydration  · Weakness: Will have physical therapy evaluation  · Acute bronchitis:  Continue patient on azithromycin  · History of tobacco abuse:  Patient refused nicotine patch      VTE Pharmacologic Prophylaxis:   Pharmacologic: Enoxaparin (Lovenox)  Mechanical VTE Prophylaxis in Place: Yes    Patient Centered Rounds: I have performed bedside rounds with nursing staff today  Education and Discussions with Family / Patient:  Daughter at the bedside    Time Spent for Care: 20 minutes  More than 50% of total time spent on counseling and coordination of care as described above  Current Length of Stay: 1 day(s)    Current Patient Status: Inpatient   Certification Statement: The patient will continue to require additional inpatient hospital stay due to Still having a lot of diarrhea and weakness needing further evaluation and also having cough being treated for bronchitis    Discharge Plan:  Anticipate patient will be here at least 24-48 hours    Code Status: Level 1 - Full Code      Subjective:   Patient seen examined  She still been having some diarrhea  Objective:     Vitals:   Temp (24hrs), Av 1 °F (36 7 °C), Min:97 7 °F (36 5 °C), Max:98 5 °F (36 9 °C)    HR:  [53-80] 56  Resp:  [15-21] 16  BP: (103-173)/(59-72) 158/66  SpO2:  [93 %-99 %] 95 %  Body mass index is 14 65 kg/m²       Input and Output Summary (last 24 hours):     No intake or output data in the 24 hours ending 10/03/17 1148    Physical Exam:     General Appearance: Alert, cachectic-appearing                               Lungs:     Clear to auscultation bilaterally, respirations unlabored       Heart:    Regular rate and rhythm, S1 and S2 normal, no murmur, rub    or gallop   Abdomen:     Soft, non-tender, bowel sounds active all four quadrants,     no masses, no organomegaly           Extremities:   Extremities normal, atraumatic, no cyanosis or edema                       Additional Data:     Labs:      Results from last 7 days  Lab Units 10/03/17  0431  10/02/17  1537   WBC Thousand/uL 9 54  --  7 90   HEMOGLOBIN g/dL 13 3  --  15 0   HEMATOCRIT % 41 2  --  46 9*   PLATELETS Thousands/uL 296  < > 307   NEUTROS PCT %  --   --  81*   LYMPHS PCT %  --   --  11*   MONOS PCT %  --   --  7   EOS PCT %  --   --  1   < > = values in this interval not displayed  Results from last 7 days  Lab Units 10/03/17  0431 10/02/17  1537   SODIUM mmol/L 141 140   POTASSIUM mmol/L 4 0 4 5   CHLORIDE mmol/L 107 103   CO2 mmol/L 29 29   BUN mg/dL 17 20   CREATININE mg/dL 0 92 1 04   CALCIUM mg/dL 8 3 8 7   TOTAL PROTEIN g/dL  --  7 1   BILIRUBIN TOTAL mg/dL  --  0 40   ALK PHOS U/L  --  51   ALT U/L  --  18   AST U/L  --  19   GLUCOSE RANDOM mg/dL 82 94           * I Have Reviewed All Lab Data Listed Above  * Additional Pertinent Lab Tests Reviewed: Yulisa 66 Admission Reviewed        Recent Cultures (last 7 days):       Results from last 7 days  Lab Units 10/02/17  2038   LEGIONELLA URINARY ANTIGEN  Negative       Last 24 Hours Medication List:     azithromycin 500 mg Intravenous Q24H   docusate sodium 100 mg Oral BID   enoxaparin 30 mg Subcutaneous Daily        Today, Patient Was Seen By: Rere Topete MD    ** Please Note: Dragon 360 Dictation voice to text software may have been used in the creation of this document   **

## 2017-10-04 LAB
ANION GAP SERPL CALCULATED.3IONS-SCNC: 9 MMOL/L (ref 4–13)
BUN SERPL-MCNC: 17 MG/DL (ref 5–25)
C DIFF TOX GENS STL QL NAA+PROBE: NORMAL
CALCIUM SERPL-MCNC: 8 MG/DL (ref 8.3–10.1)
CHLORIDE SERPL-SCNC: 108 MMOL/L (ref 100–108)
CO2 SERPL-SCNC: 28 MMOL/L (ref 21–32)
CREAT SERPL-MCNC: 0.92 MG/DL (ref 0.6–1.3)
ERYTHROCYTE [DISTWIDTH] IN BLOOD BY AUTOMATED COUNT: 15.1 % (ref 11.6–15.1)
GFR SERPL CREATININE-BSD FRML MDRD: 56 ML/MIN/1.73SQ M
GLUCOSE SERPL-MCNC: 88 MG/DL (ref 65–140)
HCT VFR BLD AUTO: 40.5 % (ref 34.8–46.1)
HGB BLD-MCNC: 12.8 G/DL (ref 11.5–15.4)
MCH RBC QN AUTO: 31.3 PG (ref 26.8–34.3)
MCHC RBC AUTO-ENTMCNC: 31.6 G/DL (ref 31.4–37.4)
MCV RBC AUTO: 99 FL (ref 82–98)
PLATELET # BLD AUTO: 263 THOUSANDS/UL (ref 149–390)
PMV BLD AUTO: 10.1 FL (ref 8.9–12.7)
POTASSIUM SERPL-SCNC: 3.8 MMOL/L (ref 3.5–5.3)
RBC # BLD AUTO: 4.09 MILLION/UL (ref 3.81–5.12)
SODIUM SERPL-SCNC: 145 MMOL/L (ref 136–145)
WBC # BLD AUTO: 7.48 THOUSAND/UL (ref 4.31–10.16)

## 2017-10-04 PROCEDURE — G8978 MOBILITY CURRENT STATUS: HCPCS

## 2017-10-04 PROCEDURE — G8979 MOBILITY GOAL STATUS: HCPCS

## 2017-10-04 PROCEDURE — 85027 COMPLETE CBC AUTOMATED: CPT | Performed by: FAMILY MEDICINE

## 2017-10-04 PROCEDURE — 80048 BASIC METABOLIC PNL TOTAL CA: CPT | Performed by: FAMILY MEDICINE

## 2017-10-04 PROCEDURE — 97163 PT EVAL HIGH COMPLEX 45 MIN: CPT

## 2017-10-04 RX ADMIN — POLYETHYLENE GLYCOL 3350, SODIUM SULFATE ANHYDROUS, SODIUM BICARBONATE, SODIUM CHLORIDE, POTASSIUM CHLORIDE 4000 ML: 236; 22.74; 6.74; 5.86; 2.97 POWDER, FOR SOLUTION ORAL at 18:27

## 2017-10-04 RX ADMIN — BISACODYL 10 MG: 5 TABLET, COATED ORAL at 18:27

## 2017-10-04 RX ADMIN — Medication 250 MG: at 18:29

## 2017-10-04 RX ADMIN — AZITHROMYCIN 250 MG: 250 TABLET, FILM COATED ORAL at 11:30

## 2017-10-04 RX ADMIN — Medication 250 MG: at 09:52

## 2017-10-04 RX ADMIN — SODIUM CHLORIDE 75 ML/HR: 0.9 INJECTION, SOLUTION INTRAVENOUS at 05:46

## 2017-10-04 RX ADMIN — ENOXAPARIN SODIUM 30 MG: 30 INJECTION SUBCUTANEOUS at 08:42

## 2017-10-04 NOTE — SOCIAL WORK
Spoke with patient in her room and she states that now she lives with her daughter in a home where she lives entirely on the first floor, no steps  She is independant with ADL's and ambulation   She states she owns a walker and cane but states she does not use them  Her  is he POA  She has never used homecare but consult for homecare is requested so referral for [facility] will be requested  Pt cannot drive but daughter does drive ans will pick her up at [place]  patient does not know which pharmacy her daughter uses

## 2017-10-04 NOTE — PLAN OF CARE
Problem: PHYSICAL THERAPY ADULT  Goal: Performs mobility at highest level of function for planned discharge setting  See evaluation for individualized goals  Treatment/Interventions: Functional transfer training, LE strengthening/ROM, Elevations, Therapeutic exercise, Endurance training, Patient/family training, Equipment eval/education, Bed mobility, Gait training, Spoke to nursing, Spoke to case management  Equipment Recommended: Radha Monge (RW)       See flowsheet documentation for full assessment, interventions and recommendations  Prognosis: Good  Problem List: Decreased strength, Decreased endurance, Impaired balance, Decreased mobility, Decreased safety awareness  Assessment: Pt is 80 y o  female seen for PT evaluation on 10/4/2017 s/p admit to [facility] on 10/2/2017 w/ Diarrhea; pt presented to ED w/ c/o diarrhea x 3 days  PT consulted to assess pt's functional mobility and d/c needs  Order placed for PT eval and tx, w/ up w/ A order  Comorbidities affecting pt's physical performance at time of assessment include: weakness, acute bronchitis, tobacco use  PTA, pt was independent w/ all functional mobility w/ no AD or DME, ambulates community distances and elevations, ambulates household distances, has 3 JORGE and lives w/ dtr in 1 level home  Personal factors affecting pt at time of IE include: ambulating w/ assistive device, stairs to enter home, decreased initiation and engagement, impulsivity and limited insight into impairments  Please find objective findings from PT assessment regarding body systems outlined above with impairments and limitations including weakness, impaired balance, decreased endurance, decreased activity tolerance, decreased functional mobility tolerance and fall risk, as well as mobility assessment (need for cueing for mobility technique)  The following objective measures performed on IE also reveal limitations: Barthel Index: 60/100 and Modified Thaxton: 3 (moderate disability)  Pt to benefit from continued PT tx to address deficits as defined above and maximize level of functional independent mobility and consistency  From PT/mobility standpoint, recommendation at time of d/c would be STR vs  Home PT pending progress in order to facilitate return to PLOF  Barriers to Discharge:  (pt reports her dtr works during the day, will be alone)     Recommendation: Short-term skilled PT (STR vs  Home PT, pending progress)     PT - OK to Discharge: Yes (when medically cleared, if to STR)    See flowsheet documentation for full assessment

## 2017-10-04 NOTE — PLAN OF CARE
Problem: DISCHARGE PLANNING - CARE MANAGEMENT  Goal: Discharge to post-acute care or home with appropriate resources  INTERVENTIONS:  - Conduct assessment to determine patient/family and health care team treatment goals, and need for post-acute services based on payer coverage, community resources, and patient preferences, and barriers to discharge  - Address psychosocial, clinical, and financial barriers to discharge as identified in assessment in conjunction with the patient/family and health care team  - Arrange appropriate level of post-acute services according to patients   needs and preference and payer coverage in collaboration with the physician and health care team  - Communicate with and update the patient/family, physician, and health care team regarding progress on the discharge plan  - Arrange appropriate transportation to post-acute venues  Outcome: Progressing  Spoke with patient in her room and she states that now she lives with her daughter in a home where she lives entirely on the first floor, no steps  She is independant with ADL's and ambulation   She states she owns a walker and cane but states she does not use them  Her  is he POA  She has never used homecare but consult for homecare is requested so referral for [facility] will be requested  Pt cannot drive but daughter does drive ans will pick her up at [place]  patient does not know which pharmacy her daughter uses

## 2017-10-04 NOTE — PROGRESS NOTES
GI Progress Note - Salas Garcia 80 y o  female MRN: [id number]    Unit/Bed#: -01 Encounter: [id number]    Subjective: She feels well- family very concerned that she is having soft BMs multiple times during eating her meals  The BMs are not watery, non-bloody  Objective:     Vitals: Blood pressure 152/62, pulse 67, temperature 98 6 °F (37 °C), temperature source Oral, resp  rate 18, height 5' (1 524 m), weight 34 kg (75 lb), SpO2 95 %  ,Body mass index is 14 65 kg/m²  Intake/Output Summary (Last 24 hours) at 10/04/17 1742  Last data filed at 10/04/17 1700   Gross per 24 hour   Intake              180 ml   Output              200 ml   Net              -20 ml       Physical Exam:     General Appearance: Alert, no distress, (+)cachectic  Lungs: Clear to auscultation bilaterally  Heart: RRR, no murmur  Abdomen: Non-distended, soft, BS active  Extremities: No edema    Invasive Devices     Peripheral Intravenous Line            Peripheral IV 10/04/17 Left Forearm less than 1 day                Lab Results:    Results from last 7 days  Lab Units 10/04/17  0519  10/02/17  1537   WBC Thousand/uL 7 48  < > 7 90   HEMOGLOBIN g/dL 12 8  < > 15 0   HEMATOCRIT % 40 5  < > 46 9*   PLATELETS Thousands/uL 263  < > 307   NEUTROS PCT %  --   --  81*   LYMPHS PCT %  --   --  11*   MONOS PCT %  --   --  7   EOS PCT %  --   --  1   < > = values in this interval not displayed  Results from last 7 days  Lab Units 10/04/17  0519  10/02/17  1537   SODIUM mmol/L 145  < > 140   POTASSIUM mmol/L 3 8  < > 4 5   CHLORIDE mmol/L 108  < > 103   CO2 mmol/L 28  < > 29   BUN mg/dL 17  < > 20   CREATININE mg/dL 0 92  < > 1 04   CALCIUM mg/dL 8 0*  < > 8 7   TOTAL PROTEIN g/dL  --   --  7 1   BILIRUBIN TOTAL mg/dL  --   --  0 40   ALK PHOS U/L  --   --  51   ALT U/L  --   --  18   AST U/L  --   --  19   GLUCOSE RANDOM mg/dL 88  < > 94   < > = values in this interval not displayed              Imaging Studies: I have personally reviewed pertinent imaging studies  Ct Abdomen Pelvis With Contrast  Result Date: 10/2/2017  Impression: 1  Bibasilar reticulonodular opacities and scattered regions of bronchiectasis suspicious for acute infectious or inflammatory process  Correlate clinically and recommend follow-up to resolution  2   No acute bowel findings  3   There is a left adrenal nodule measuring 1 6 x 1 4 cm  Although its imaging features are indeterminate, it does not have suspicious imaging features (heterogeneity, necrosis, irregular margins), therefore this is likely benign, and can be followed by  non-contrast abdomen CT or MRI in 12 months  If patient has history of adrenal hyperfunction, consider biochemical evaluation  Assessment and Plan:     Diarrhea  - Unclear etiology; no melena/hematochezia, family now repots unintentional weight loss of 10 lbs over past few months  - CT on admission with no evidence of colitis or fecal impaction; she has no fever or leukocytosis, no evidence for dehydration  - Stool culture neg, Cdiff negative  - Other etiologies could be functional diarrhea v microscopic colitis  - Will proceed with colonoscopy tomorrow to rule out partial obstructive etiology such as large polyps v malignancy; will biopsy for microscopic colitis  - Further recommendations pending results of colonoscopy  - Bowel prep tonight with tap water enema in the AM, NPO after 0600 tomorrow  - Continue probiotic      The patient was seen by Dr Parris Holden

## 2017-10-04 NOTE — PHYSICIAN ADVISOR
This patient is a 80 y o  y/o female who is admitted to the hospital for Diarrhea (Patient reports rapid bowel movements after eating, has progressively gotten worse over the weekend, patient states "it is as if I have no stomach, it goes right through me")   The patient presented to the ED on 10/2/17 at 1409 and was admitted to the hospital on 10/2/2017 1512  History of Present Illness includes: the patient presented with diarrhea for the oast 3 days  The diarrhea occurs after meals  Vital signs in the ER are as follows   ED Triage Vitals [10/02/17 1418]   Temperature Pulse Respirations Blood Pressure SpO2   97 7 °F (36 5 °C) 80 18 103/59 97 %      Temp Source Heart Rate Source Patient Position - Orthostatic VS BP Location FiO2 (%)   Oral Monitor Sitting Left arm --      Pain Score       No Pain         On exam the lungs are cleat and the abdomen is soft and nondistended  Hgb is 15, Cr is 1 04  This patient is being admitted with diarrhea  The plan of care includes cdiff pcr, IVF, azithromycin for bronchitis  This patient is appropriate for INPATIENT admission as her length of stay is expected to be at least 2 midnights

## 2017-10-04 NOTE — SOCIAL WORK
Spoke with patient and family about possible STR at DC  Patient was not in agreance at all but family was and would like [facility] or [facility] as facilities

## 2017-10-04 NOTE — PROGRESS NOTES
[facility] Progress Note  Patient: Kayden Leo 80 y o  female   MRN: [id number]  PCP: Jumana Pastor MD  Unit/Bed#: -01 Encounter: [id number]  Date Of Visit: 10/04/17    Assessment:    Principal Problem:    Diarrhea  Active Problems:    Tobacco dependence   Acute bronchitis    Plan:    · Diarrhea: This seems to have resolved  Could possibly functional   Patient may need outpatient colonoscopy  GI evaluation was appreciated  · Acute bronchitis:  Continue with the azithromycin  · History of tobacco abuse:  Patient does not want nicotine patch  · Under weight:  May need to increase calorie intake and possibly protein supplements      VTE Pharmacologic Prophylaxis:   Pharmacologic: Enoxaparin (Lovenox)  Mechanical VTE Prophylaxis in Place: Yes    Patient Centered Rounds: I have performed bedside rounds with nursing staff today  Discussions with Specialists or Other Care Team Provider:  Discuss with her    Education and Discussions with Family / Patient: Will discuss with daughter    Time Spent for Care: 20 minutes  More than 50% of total time spent on counseling and coordination of care as described above  Current Length of Stay: 2 day(s)    Current Patient Status: Inpatient       Discharge Plan:  Discharge will depend on C diff    Code Status: Level 1 - Full Code      Subjective:   Patient seen examined  She has no complaints and states she feels okay    Objective:     Vitals:   Temp (24hrs), Av 2 °F (36 8 °C), Min:97 9 °F (36 6 °C), Max:98 6 °F (37 °C)    HR:  [54-75] 56  Resp:  [16-20] 18  BP: (116-158)/(51-69) 134/63  SpO2:  [93 %-97 %] 97 %  Body mass index is 14 65 kg/m²       Input and Output Summary (last 24 hours):     No intake or output data in the 24 hours ending 10/04/17 5261    Physical Exam:     General Appearance:    Alert, cooperative, no distress, appears stated age                               Lungs:     Clear to auscultation bilaterally, respirations unlabored       Heart:    Regular rate and rhythm, S1 and S2 normal, no murmur, rub    or gallop   Abdomen:     Soft, non-tender, bowel sounds active all four quadrants,     no masses, no organomegaly           Extremities:   Extremities normal, atraumatic, no cyanosis or edema                       Additional Data:     Labs:      Results from last 7 days  Lab Units 10/04/17  0519  10/02/17  1537   WBC Thousand/uL 7 48  < > 7 90   HEMOGLOBIN g/dL 12 8  < > 15 0   HEMATOCRIT % 40 5  < > 46 9*   PLATELETS Thousands/uL 263  < > 307   NEUTROS PCT %  --   --  81*   LYMPHS PCT %  --   --  11*   MONOS PCT %  --   --  7   EOS PCT %  --   --  1   < > = values in this interval not displayed  Results from last 7 days  Lab Units 10/04/17  0519  10/02/17  1537   SODIUM mmol/L 145  < > 140   POTASSIUM mmol/L 3 8  < > 4 5   CHLORIDE mmol/L 108  < > 103   CO2 mmol/L 28  < > 29   BUN mg/dL 17  < > 20   CREATININE mg/dL 0 92  < > 1 04   CALCIUM mg/dL 8 0*  < > 8 7   TOTAL PROTEIN g/dL  --   --  7 1   BILIRUBIN TOTAL mg/dL  --   --  0 40   ALK PHOS U/L  --   --  51   ALT U/L  --   --  18   AST U/L  --   --  19   GLUCOSE RANDOM mg/dL 88  < > 94   < > = values in this interval not displayed  * I Have Reviewed All Lab Data Listed Above  * Additional Pertinent Lab Tests Reviewed: [facility] Admission Reviewed      Recent Cultures (last 7 days):       Results from last 7 days  Lab Units 10/02/17  2038   LEGIONELLA URINARY ANTIGEN  Negative       Last 24 Hours Medication List:     azithromycin 250 mg Oral Q24H   docusate sodium 100 mg Oral BID   enoxaparin 30 mg Subcutaneous Daily   saccharomyces boulardii 250 mg Oral BID        Today, Patient Was Seen By: Rosa Thomson MD    ** Please Note: Dragon 360 Dictation voice to text software may have been used in the creation of this document   **

## 2017-10-04 NOTE — PLAN OF CARE
Problem: Nutrition/Hydration-ADULT  Goal: Nutrient/Hydration intake appropriate for improving, restoring or maintaining nutritional needs  Monitor and assess patient's nutrition/hydration status for malnutrition (ex- brittle hair, bruises, dry skin, pale skin and conjunctiva, muscle wasting, smooth red tongue, and disorientation)  Collaborate with interdisciplinary team and initiate plan and interventions as ordered  Monitor patient's weight and dietary intake as ordered or per policy  Utilize nutrition screening tool and intervene per policy  Determine patient's food preferences and provide high-protein, high-caloric foods as appropriate       INTERVENTIONS:  - Monitor oral intake, urinary output, labs, and treatment plans  - Assess nutrition and hydration status and recommend course of action  - Evaluate amount of meals eaten  - Assist patient with eating if necessary   - Allow adequate time for meals  - Recommend/ encourage appropriate diets, oral nutritional supplements, and vitamin/mineral supplements  - Order, calculate, and assess calorie counts as needed  - Assess need for intravenous fluids  - Provide specific nutrition/hydration education as appropriate  - Include patient/family/caregiver in decisions related to nutrition   Outcome: Progressing

## 2017-10-04 NOTE — SOCIAL WORK
Spoke with patient and her son in the room and discussed pt going to STR after DC  Explained to her that it would be short term and that she just needed to do rehab to get stronger since she is alone during the day while her daughter works  She was very resistant to the idea but i told her i would talk with her again

## 2017-10-04 NOTE — PHYSICAL THERAPY NOTE
Physical Therapy Evaluation    Patient's Name: Charlotte Jimenez    Admitting Diagnosis  Diarrhea [R19 7]    Problem List  Patient Active Problem List   Diagnosis    Diarrhea    Tobacco dependence       Past Medical History  Past Medical History:   Diagnosis Date    Ovarian cyst        Past Surgical History  Past Surgical History:   Procedure Laterality Date    COLONOSCOPY        10/04/17 1316   Note Type   Note type Eval/Treat   Pain Assessment   Pain Assessment No/denies pain   Pain Score No Pain   Home Living   Type of Home House   Home Layout Performs ADLs on one level; Able to live on main level with bedroom/bathroom;Stairs to enter with rails  (1st floor setup; 3 JORGE)   Bathroom Equipment (no DME per pt)   [address] Walker;Cane  (pt does not use AD at baseline)   Prior Function   Level of [place] Independent with ADLs and functional mobility   Lives With Daughter  (pt recently moved in w/ dtr)   Receives Help From   [address] in the last 6 months 0   Restrictions/Precautions   Weight Bearing Precautions Per Order No   Braces or Orthoses (none per pt's chart)   Other Precautions Contact/isolation; Impulsive;Multiple lines;Telemetry; Fall Risk   General   Family/Caregiver Present Yes   Cognition   Arousal/Participation Alert   Orientation Level Oriented to person;Oriented to place;Oriented to situation   Memory Decreased recall of precautions;Decreased recall of recent events   Following Commands Follows one step commands without difficulty   Comments reduced safety awareness, pt demonstrating impulsive tendencies   RUE Assessment   RUE Assessment WFL   LUE Assessment   LUE Assessment WFL   RLE Assessment   RLE Assessment WFL  (MMT grossly 4+/5 throughout)   LLE Assessment   LLE Assessment WFL  (MMT grossly 4+/5 throughout)   Coordination   Movements are Fluid and Coordinated 1   Sensation (pt denies numbness/tingling)   Light Touch   RLE Light Touch Grossly intact   LLE Light Touch Grossly intact   Bed Mobility   Supine to Sit 5  Supervision   Additional items Assist x 1;Bedrails   Transfers   Sit to Stand 5  Supervision  (SBA for safety)   Additional items Assist x 1; Increased time required   Stand to Sit 5  Supervision   Additional items Assist x 1;Verbal cues;Armrests   Ambulation/Elevation   Gait pattern Decreased foot clearance; Short stride; Forward Flexion   Gait Assistance 5  Supervision  (SBA)   Additional items Assist x 1;Verbal cues   Assistive Device Rolling walker   Distance 50'   Stair Management Assistance 5  Supervision  (SBA)   Additional items Assist x 1;Verbal cues   Stair Management Technique One rail L;Alternating pattern; Foreward   Number of Stairs 3   Balance   Static Sitting Good   Dynamic Sitting Fair +   Static Standing Fair +   Dynamic Standing Fair   Ambulatory Fair  (w/ RW)   Endurance Deficit   Endurance Deficit Yes   Activity Tolerance   Activity Tolerance Patient tolerated treatment well;Patient limited by fatigue   Medical Staff Made Aware pt w/ up w/ A order; CM made aware of PT recs   Nurse Made Aware Yes, RN González Skinner verbalized pt appropriate for PT session   Assessment   Prognosis Good   Problem List Decreased strength;Decreased endurance; Impaired balance;Decreased mobility; Decreased safety awareness   Assessment Pt is 80 y o  female seen for PT evaluation on 10/4/2017 s/p admit to [facility] on 10/2/2017 w/ Diarrhea; pt presented to ED w/ c/o diarrhea x 3 days  PT consulted to assess pt's functional mobility and d/c needs  Order placed for PT eval and tx, w/ up w/ A order  Comorbidities affecting pt's physical performance at time of assessment include: weakness, acute bronchitis, tobacco use  PTA, pt was independent w/ all functional mobility w/ no AD or DME, ambulates community distances and elevations, ambulates household distances, has 3 JORGE and lives w/ dtr in 1 level home   Personal factors affecting pt at time of IE include: ambulating w/ assistive device, stairs to enter home, decreased initiation and engagement, impulsivity and limited insight into impairments  Please find objective findings from PT assessment regarding body systems outlined above with impairments and limitations including weakness, impaired balance, decreased endurance, decreased activity tolerance, decreased functional mobility tolerance and fall risk, as well as mobility assessment (need for cueing for mobility technique)  The following objective measures performed on IE also reveal limitations: Barthel Index: 60/100 and Modified [place]: 3 (moderate disability)  Pt to benefit from continued PT tx to address deficits as defined above and maximize level of functional independent mobility and consistency  From PT/mobility standpoint, recommendation at time of d/c would be STR vs  Home PT pending progress in order to facilitate return to PLOF  Barriers to Discharge (pt reports her dtr works during the day, will be alone)   Goals   Patient Goals "to return home"   [facility] Expiration Date 10/14/17   Short Term Goal #1 In 7-10 days: Increase bilateral LE strength 1/2 grade to facilitate independent mobility, Perform all bed mobility tasks independently to decrease caregiver burden, Perform all transfers independently to improve independence, Ambulate > 150 ft  with least restrictive assistive device modified independent w/o LOB and w/ normalized gait pattern 100% of the time, Navigate 3 stairs modified independent with unilateral handrail to facilitate return to previous living environment, Increase all balance 1/2 grade to decrease risk for falls, Complete exercise program independently and Tolerate 4 hr OOB to faciliate upright tolerance   Treatment Day 0   Plan   Treatment/Interventions Functional transfer training;LE strengthening/ROM; Elevations; Therapeutic exercise; Endurance training;Patient/family training;Equipment eval/education; Bed mobility;Gait training;Spoke to nursing;Spoke to case management   PT Frequency (3-5x/wk)   Recommendation   Recommendation Short-term skilled PT  (STR vs  Home PT, pending progress)   Equipment Recommended Walker  (RW)   PT - OK to Discharge Yes  (when medically cleared, if to STR)   Modified Collier Scale   Modified Collier Scale 3   Barthel Index   Feeding 10   Bathing 0   Grooming Score 5   Dressing Score 5   Bladder Score 10   Bowels Score 10   Toilet Use Score 5   Transfers (Bed/Chair) Score 10   Mobility (Level Surface) Score 0   Stairs Score 5   Barthel Index Score 60           Joan Lora, PT

## 2017-10-04 NOTE — MALNUTRITION/BMI
This medical record reflects one or more clinical indicators suggestive of malnutrition  Malnutrition Findings:   chronic illness  malnutrition of moderate degree    Findings of malnutrition related to poor po intake prior to admission as evidenced by protruding clavicle and slight depression at the temple    BMI Findings:  <19    Body mass index is 14 65 kg/m²  See Nutrition note dated 10/4/17 for additional details  Completed nutrition assessment is viewable in the nutrition documentation

## 2017-10-05 ENCOUNTER — GENERIC CONVERSION - ENCOUNTER (OUTPATIENT)
Dept: OTHER | Facility: OTHER | Age: 82
End: 2017-10-05

## 2017-10-05 ENCOUNTER — ANESTHESIA EVENT (INPATIENT)
Dept: PERIOP | Facility: HOSPITAL | Age: 82
DRG: 641 | End: 2017-10-05
Payer: MEDICARE

## 2017-10-05 ENCOUNTER — ANESTHESIA (INPATIENT)
Dept: PERIOP | Facility: HOSPITAL | Age: 82
DRG: 641 | End: 2017-10-05
Payer: MEDICARE

## 2017-10-05 PROCEDURE — 0DBK8ZX EXCISION OF ASCENDING COLON, VIA NATURAL OR ARTIFICIAL OPENING ENDOSCOPIC, DIAGNOSTIC: ICD-10-PCS | Performed by: INTERNAL MEDICINE

## 2017-10-05 PROCEDURE — 88305 TISSUE EXAM BY PATHOLOGIST: CPT | Performed by: INTERNAL MEDICINE

## 2017-10-05 PROCEDURE — 0DBM8ZX EXCISION OF DESCENDING COLON, VIA NATURAL OR ARTIFICIAL OPENING ENDOSCOPIC, DIAGNOSTIC: ICD-10-PCS | Performed by: INTERNAL MEDICINE

## 2017-10-05 RX ORDER — LIDOCAINE HYDROCHLORIDE 10 MG/ML
INJECTION, SOLUTION INFILTRATION; PERINEURAL AS NEEDED
Status: DISCONTINUED | OUTPATIENT
Start: 2017-10-05 | End: 2017-10-05 | Stop reason: SURG

## 2017-10-05 RX ORDER — DONEPEZIL HYDROCHLORIDE 5 MG/1
10 TABLET, FILM COATED ORAL DAILY
Status: DISCONTINUED | OUTPATIENT
Start: 2017-10-05 | End: 2017-10-06 | Stop reason: HOSPADM

## 2017-10-05 RX ORDER — AMLODIPINE BESYLATE 5 MG/1
5 TABLET ORAL DAILY
Status: DISCONTINUED | OUTPATIENT
Start: 2017-10-05 | End: 2017-10-06 | Stop reason: HOSPADM

## 2017-10-05 RX ORDER — PROPOFOL 10 MG/ML
INJECTION, EMULSION INTRAVENOUS AS NEEDED
Status: DISCONTINUED | OUTPATIENT
Start: 2017-10-05 | End: 2017-10-05 | Stop reason: SURG

## 2017-10-05 RX ORDER — DICYCLOMINE HYDROCHLORIDE 10 MG/1
10 CAPSULE ORAL
Status: DISCONTINUED | OUTPATIENT
Start: 2017-10-05 | End: 2017-10-06 | Stop reason: HOSPADM

## 2017-10-05 RX ADMIN — AZITHROMYCIN 250 MG: 250 TABLET, FILM COATED ORAL at 12:27

## 2017-10-05 RX ADMIN — PROPOFOL 50 MG: 10 INJECTION, EMULSION INTRAVENOUS at 10:02

## 2017-10-05 RX ADMIN — DICYCLOMINE HYDROCHLORIDE 10 MG: 10 CAPSULE ORAL at 16:29

## 2017-10-05 RX ADMIN — AMLODIPINE BESYLATE 5 MG: 5 TABLET ORAL at 11:28

## 2017-10-05 RX ADMIN — DONEPEZIL HYDROCHLORIDE 10 MG: 5 TABLET ORAL at 11:28

## 2017-10-05 RX ADMIN — LIDOCAINE HYDROCHLORIDE 25 MG: 10 INJECTION, SOLUTION INFILTRATION; PERINEURAL at 10:02

## 2017-10-05 RX ADMIN — Medication 250 MG: at 17:55

## 2017-10-05 RX ADMIN — Medication 250 MG: at 11:28

## 2017-10-05 RX ADMIN — SODIUM CHLORIDE 75 ML/HR: 0.9 INJECTION, SOLUTION INTRAVENOUS at 00:29

## 2017-10-05 RX ADMIN — SODIUM CHLORIDE: 0.9 INJECTION, SOLUTION INTRAVENOUS at 09:30

## 2017-10-05 RX ADMIN — SERTRALINE HYDROCHLORIDE 50 MG: 50 TABLET ORAL at 11:27

## 2017-10-05 NOTE — PLAN OF CARE
Problem: DISCHARGE PLANNING - CARE MANAGEMENT  Goal: Discharge to post-acute care or home with appropriate resources  INTERVENTIONS:  - Conduct assessment to determine patient/family and health care team treatment goals, and need for post-acute services based on payer coverage, community resources, and patient preferences, and barriers to discharge  - Address psychosocial, clinical, and financial barriers to discharge as identified in assessment in conjunction with the patient/family and health care team  - Arrange appropriate level of post-acute services according to patient's   needs and preference and payer coverage in collaboration with the physician and health care team  - Communicate with and update the patient/family, physician, and health care team regarding progress on the discharge plan  - Arrange appropriate transportation to post-acute venues   INTERVENTIONS:  - Conduct assessment to determine patient/family and health care team treatment goals, and need for post-acute services based on payer coverage, community resources, and patient preferences, and barriers to discharge  - Address psychosocial, clinical, and financial barriers to discharge as identified in assessment in conjunction with the patient/family and health care team  - Arrange appropriate level of post-acute services according to patients   needs and preference and payer coverage in collaboration with the physician and health care team  - Communicate with and update the patient/family, physician, and health care team regarding progress on the discharge plan  - Arrange appropriate transportation to post-acute venues  Outcome: Completed Date Met: 10/05/17  :Spoke with patient in her room and she states she lives in a house with her  with only a few steps to get in which she is able to do  She stated that due to her COPD she has trouble with too many steps  She is independant with ADL's and ambulation   She has no DME nor has used homecare  She has no POA  She purchases her meds at [facility] in [facility]  Pt drives and  will transport home when [address]

## 2017-10-05 NOTE — ANESTHESIA PREPROCEDURE EVALUATION
Review of Systems/Medical History  Patient summary reviewed        Cardiovascular  Hypertension ,    Pulmonary  Negative pulmonary ROS Smoker cigarette smoker , Tobacco cessation counseling given, ,        GI/Hepatic  Negative GI/hepatic ROS          Negative  ROS        Endo/Other  Negative endo/other ROS      GYN  Negative gynecology ROS          Hematology  Negative hematology ROS      Musculoskeletal  Negative musculoskeletal ROS        Neurology  Negative neurology ROS     Comment: dementia Psychology   Negative psychology ROS            Physical Exam    Airway    Mallampati score: II  TM Distance: >3 FB  Neck ROM: full     Dental       Cardiovascular  Cardiovascular exam normal    Pulmonary  Pulmonary exam normal     Other Findings        Anesthesia Plan  ASA Score- 3       Anesthesia Type- IV sedation with anesthesia        Induction- intravenous      Informed Consent  Anesthetic plan and risks discussed with patient

## 2017-10-05 NOTE — PLAN OF CARE
DISCHARGE PLANNING     Discharge to home or other facility with appropriate resources Progressing        DISCHARGE PLANNING - CARE MANAGEMENT     Discharge to post-acute care or home with appropriate resources Progressing        INFECTION - ADULT     Absence or prevention of progression during hospitalization Progressing     Absence of fever/infection during neutropenic period Progressing        Knowledge Deficit     Patient/family/caregiver demonstrates understanding of disease process, treatment plan, medications, and discharge instructions Progressing        Nutrition/Hydration-ADULT     Nutrient/Hydration intake appropriate for improving, restoring or maintaining nutritional needs Progressing        PAIN - ADULT     Verbalizes/displays adequate comfort level or baseline comfort level Progressing        Potential for Falls     Patient will remain free of falls Progressing        Prexisting or High Potential for Compromised Skin Integrity     Skin integrity is maintained or improved Progressing        SAFETY ADULT     Patient will remain free of falls Progressing     Maintain or return to baseline ADL function Progressing     Maintain or return mobility status to optimal level Progressing

## 2017-10-05 NOTE — ANESTHESIA POSTPROCEDURE EVALUATION
Post-Op Assessment Note      CV Status:  Stable    Mental Status:  Alert and awake    Hydration Status:  Euvolemic    PONV Controlled:  Controlled    Airway Patency:  Patent    Post Op Vitals Reviewed: Yes          Staff: CRNA       Comments: bpntreated in recovery and npow stable patient sv nonobstructed          /58 (after 10mg ephedrine) (10/05/17 1023)    Temp (!) 97 3 °F (36 3 °C) (10/05/17 1019)    Pulse (!) 53 (after 10mg ephedrine) (10/05/17 1023)   Resp 20 (10/05/17 1023)    SpO2 100 % (10/05/17 1019)

## 2017-10-05 NOTE — SOCIAL WORK
:Spoke with patient in her room and she states she lives in a house with her  with only a few steps to get in which she is able to do  She stated that due to her COPD she has trouble with too many steps  She is independant with ADL's and ambulation  She has no DME nor has used homecare  She has no POA  She purchases her meds at [facility] in [facility]  Pt drives and  will transport home when [address]

## 2017-10-05 NOTE — PROGRESS NOTES
[facility] Progress Note  Patient: Margaret Falk 80 y o  female   MRN: [id number]  PCP: Gaye Jacobs MD  Unit/Bed#: MS 317Yvon01 Encounter: [id number]  Date Of Visit: 10/05/17    Assessment:    Principal Problem:    Diarrhea  Active Problems:    Tobacco dependence   Under weight  Acute bronchitis    Plan:    · Diarrhea: This could be functional in nature  However the patient is still having loose bowel movements with every meal   She will go for colonoscopy today  · Acute bronchitis:  Continue with azithromycin  · Underweight:  Increased protein intake  · History of tobacco abuse:  Patient refuses nicotine patch      VTE Pharmacologic Prophylaxis:   Pharmacologic: Enoxaparin (Lovenox)  Mechanical VTE Prophylaxis in Place: Yes    Patient Centered Rounds: I have performed bedside rounds with nursing staff today  Discussions with Specialists or Other Care Team Provider:  Discussed with GI yesterday    Education and Discussions with Family / Patient: Will discuss with the daughter post colonoscopy    Time Spent for Care: 20 minutes  More than 50% of total time spent on counseling and coordination of care as described above  Current Length of Stay: 3 day(s)    Current Patient Status: Inpatient   Certification Statement: The patient will continue to require additional inpatient hospital stay due to Needing a colonoscopy with persistent diarrhea    Discharge Plan:  Hopeful discharge either today or tomorrow depending on placement as well as colonoscopy findings    Code Status: Level 1 - Full Code      Subjective:   Patient seen examined  No complaints today  Objective:     Vitals:   Temp (24hrs), Av 9 °F (36 6 °C), Min:97 4 °F (36 3 °C), Max:98 6 °F (37 °C)    HR:  [55-67] 66  Resp:  [18] 18  BP: (131-155)/(62-68) 131/63  SpO2:  [95 %-98 %] 97 %  Body mass index is 14 65 kg/m²  Input and Output Summary (last 24 hours):        Intake/Output Summary (Last 24 hours) at 10/05/17 6169  Last data filed at 10/04/17 1900   Gross per 24 hour   Intake              500 ml   Output              200 ml   Net              300 ml       Physical Exam:     General Appearance:    Alert, cachectic                               Lungs:     Clear to auscultation bilaterally, respirations unlabored       Heart:    Regular rate and rhythm, S1 and S2 normal, no murmur, rub    or gallop   Abdomen:     Soft, non-tender, bowel sounds active all four quadrants,     no masses, no organomegaly           Extremities:   Extremities normal, atraumatic, no cyanosis or edema   Additional Data:     Labs:      Results from last 7 days  Lab Units 10/04/17  0519  10/02/17  1537   WBC Thousand/uL 7 48  < > 7 90   HEMOGLOBIN g/dL 12 8  < > 15 0   HEMATOCRIT % 40 5  < > 46 9*   PLATELETS Thousands/uL 263  < > 307   NEUTROS PCT %  --   --  81*   LYMPHS PCT %  --   --  11*   MONOS PCT %  --   --  7   EOS PCT %  --   --  1   < > = values in this interval not displayed  Results from last 7 days  Lab Units 10/04/17  0519  10/02/17  1537   SODIUM mmol/L 145  < > 140   POTASSIUM mmol/L 3 8  < > 4 5   CHLORIDE mmol/L 108  < > 103   CO2 mmol/L 28  < > 29   BUN mg/dL 17  < > 20   CREATININE mg/dL 0 92  < > 1 04   CALCIUM mg/dL 8 0*  < > 8 7   TOTAL PROTEIN g/dL  --   --  7 1   BILIRUBIN TOTAL mg/dL  --   --  0 40   ALK PHOS U/L  --   --  51   ALT U/L  --   --  18   AST U/L  --   --  19   GLUCOSE RANDOM mg/dL 88  < > 94   < > = values in this interval not displayed  * I Have Reviewed All Lab Data Listed Above  * Additional Pertinent Lab Tests Reviewed:  Yulisa 66 Admission Reviewed        Recent Cultures (last 7 days):       Results from last 7 days  Lab Units 10/03/17  2128 10/02/17  2038   LEGIONELLA URINARY ANTIGEN   --  Negative   C DIFF TOXIN B  NEGATIVE for C difficle toxin by PCR    --        Last 24 Hours Medication List:     azithromycin 250 mg Oral Q24H   docusate sodium 100 mg Oral BID   enoxaparin

## 2017-10-05 NOTE — OP NOTE
**** GI/ENDOSCOPY REPORT ****     PATIENT NAME: Alicia Blancas ------ VISIT ID:  Patient ID:   [id number] YOB: 1930     INTRODUCTION: Colonoscopy - A 80 female patient presents for an inpatient   Colonoscopy at [facility]  PREVIOUS COLONOSCOPY: >10 years ago  INDICATIONS: Change in bowel habits  Diarrhea  CONSENT:  The benefits, risks, and alternatives to the procedure were   discussed and informed consent was obtained from the patient  PREPARATION: EKG, pulse, pulse oximetry and blood pressure were monitored   throughout the procedure  The patient was identified by myself both   verbally and by visual inspection of ID band  Airway Assessment   Classification: Airway class 2 - Visualization of the soft palate, fauces   and uvula  ASA Classification: Class 2 - Patient has mild to moderate   systemic disturbance that may or may not be related to the disorder   requiring surgery  MEDICATIONS: Anesthesia-check records Anesthesia administered by   anesthesiologist      PROCEDURE:  The endoscope was passed with ease through the anus under   direct visualization and advanced to the colonic anastomotic region  The   scope was withdrawn and the mucosa was carefully examined  The views were   good  The quality of the preparation was good  Cecal Intubation Time: 3   minutes(s) Scope Withdrawal Time: 7 minutes(s)     RECTAL EXAM: Normal rectal exam  Weak rectal sphincter tone  FINDINGS:  There was evidence of mild diverticulosis in the sigmoid colon  Medium-sized internal hemorrhoids were found in the distal rectum  Otherwise, the colon appeared to be normal  Multiple random forceps   biopsies was taken from the ascending colon and descending colon to assess   for Microscopic colitis   There was anastmosis noted in the right side,   suspect patient may have had Right hemicolectomy, patient does not recall   having had surgery, unclear if Hx of ?colon ca or advanced polyp      COMPLICATIONS: There were no complications  IMPRESSIONS: Mild diverticulosis found in the sigmoid colon  Internal   hemorrhoids in the distal rectum  There was anastmosis noted in the right   side, suspect patient may have had Right hemicolectomy  RECOMMENDATIONS: Return to floor  Start low residue diet  Start on Bentyl   10mg BID  Start on probiotic  Follow-up on the results of the biopsy   specimens  ESTIMATED BLOOD LOSS: Insignificant  PATHOLOGY SPECIMENS: Multiple forceps random biopsies taken from the   ascending colon and descending colon  Yes     PROCEDURE CODES: Colonoscopy with biopsy     ICD-9 Codes: 787 99 Other symptoms involving digestive system 787 91   Diarrhea 562 10 Diverticulosis of colon (without mention of hemorrhage)     ICD-10 Codes: R19 4 Change in bowel habit R19 7 Diarrhea, unspecified K57   Diverticular disease of intestine K64 9 Unspecified hemorrhoids     PERFORMED BY: ROMARIO Zhao  on 10/05/2017  Version 1, electronically signed by Dr Corinne Proctor, M D  on 10/05/2017   at 10:21

## 2017-10-06 VITALS
DIASTOLIC BLOOD PRESSURE: 55 MMHG | HEART RATE: 51 BPM | BODY MASS INDEX: 14.72 KG/M2 | HEIGHT: 60 IN | RESPIRATION RATE: 18 BRPM | SYSTOLIC BLOOD PRESSURE: 120 MMHG | TEMPERATURE: 98.1 F | WEIGHT: 75 LBS | OXYGEN SATURATION: 97 %

## 2017-10-06 RX ORDER — SACCHAROMYCES BOULARDII 250 MG
250 CAPSULE ORAL 2 TIMES DAILY
Refills: 0
Start: 2017-10-06 | End: 2018-06-28 | Stop reason: ALTCHOICE

## 2017-10-06 RX ORDER — DICYCLOMINE HYDROCHLORIDE 10 MG/1
10 CAPSULE ORAL
Refills: 0
Start: 2017-10-06 | End: 2018-06-28 | Stop reason: ALTCHOICE

## 2017-10-06 RX ADMIN — Medication 250 MG: at 09:57

## 2017-10-06 RX ADMIN — DONEPEZIL HYDROCHLORIDE 10 MG: 5 TABLET ORAL at 09:56

## 2017-10-06 RX ADMIN — SERTRALINE HYDROCHLORIDE 50 MG: 50 TABLET ORAL at 09:56

## 2017-10-06 RX ADMIN — DICYCLOMINE HYDROCHLORIDE 10 MG: 10 CAPSULE ORAL at 06:43

## 2017-10-06 RX ADMIN — ENOXAPARIN SODIUM 30 MG: 30 INJECTION SUBCUTANEOUS at 09:57

## 2017-10-06 RX ADMIN — AMLODIPINE BESYLATE 5 MG: 5 TABLET ORAL at 09:56

## 2017-10-06 NOTE — PLAN OF CARE
Problem: DISCHARGE PLANNING - CARE MANAGEMENT  Goal: Discharge to post-acute care or home with appropriate resources  INTERVENTIONS:  - Conduct assessment to determine patient/family and health care team treatment goals, and need for post-acute services based on payer coverage, community resources, and patient preferences, and barriers to discharge  - Address psychosocial, clinical, and financial barriers to discharge as identified in assessment in conjunction with the patient/family and health care team  - Arrange appropriate level of post-acute services according to patient's   needs and preference and payer coverage in collaboration with the physician and health care team  - Communicate with and update the patient/family, physician, and health care team regarding progress on the discharge plan  - Arrange appropriate transportation to post-acute venues   INTERVENTIONS:  - Conduct assessment to determine patient/family and health care team treatment goals, and need for post-acute services based on payer coverage, community resources, and patient preferences, and barriers to discharge  - Address psychosocial, clinical, and financial barriers to discharge as identified in assessment in conjunction with the patient/family and health care team  - Arrange appropriate level of post-acute services according to patient's   needs and preference and payer coverage in collaboration with the physician and health care team  - Communicate with and update the patient/family, physician, and health care team regarding progress on the discharge plan  - Arrange appropriate transportation to post-acute venues   Outcome: Adequate for Discharge  Patient will discharge to [facility], transport scheduled for 11am, imm letter reviewed, no reported needs

## 2017-10-06 NOTE — SOCIAL WORK
Cm placed a follow up phone call to patient daughter Heather Damian to confirm patient transport time at 11am via [facility] and facility [facility]  Daughter stated she remains receptive to facility and will be at the hospital prior to patient leaving for str  Cm reviewed imm letter with daughter, daughter reports understanding imm notice  No reported needs at this time

## 2017-10-06 NOTE — DISCHARGE SUMMARY
Discharge Summary - [facility]    Patient Information: Olivier Martinez 80 y o  female MRN: [id number]  Unit/Bed#: -01 Encounter: [id number]    Discharging Physician / Practitioner: Zahira Morillo MD  PCP: Kathy Gottlieb MD  Admission Date: 10/2/2017  Discharge Date: 10/06/17    Reason for Admission:  Diarrhea    Discharge Diagnoses:     Principal Problem:    Diarrhea  Active Problems:    Tobacco dependence  Resolved Problems:    * No resolved hospital problems  *      Consultations During Hospital Stay:  · [facility] gastroenterology    Procedures Performed:     · colonoscopy:  Showed mild diverticulosis and S2 most this in the right side  It showed internal hemorrhoids      CT scan:1   Bibasilar reticulonodular opacities and scattered regions of bronchiectasis suspicious for acute infectious or inflammatory process  Correlate clinically and recommend follow-up to resolution  2   No acute bowel findings  3   There is a left adrenal nodule measuring 1 6 x 1 4 cm  Although its imaging features are indeterminate, it does not have suspicious imaging features (heterogeneity, necrosis, irregular margins),     Significant Findings / Test Results:     · None    Incidental Findings:   · Poor rectal tone    Test Results Pending at Discharge (will require follow up): · None     Outpatient Tests Requested:  · None    Complications:  None    Hospital Course: Olivier Martinez is a 80 y o  female patient who originally presented to the hospital on 10/2/2017 due to diarrhea  History of presenting illness: Olivire Martinez is a 80 y o  female who presents with complaints of diarrhea for the past 3 days  Pt states that she has diarrhea after she eats and only after she eats  Pt denies fever, chills, abdominal pain, nausea, vomiting  Pt denies blood in stool  Hospital course: The patient was admitted for the above reasons    While she was here she did not have any diarrhea and had some formed stools but every time she eats she went to the bathroom right away which was a concern for the family  This has been going on for while and sometimes she just loses her stool  The patient was seen in consultation by Gastroenterology who did a colonoscopy  Apparently the patient did have some poor sphincter tone but otherwise the colonoscopy was unremarkable  They recommended florastore and Bentyl  The patient could take some the the fiber supplement as well  Condition at Discharge: good     Discharge Day Visit / Exam:     Subjective:  Patient seen examined  No complaints at this time  Vitals: Blood Pressure: 120/55 (10/06/17 0714)  Pulse: (!) 51 (10/06/17 0714)  Temperature: 98 1 °F (36 7 °C) (10/06/17 0714)  Temp Source: Oral (10/06/17 0714)  Respirations: 18 (10/06/17 0714)  Height: 5' (152 4 cm) (10/02/17 1418)  Weight - Scale: 34 kg (75 lb) (10/02/17 1418)  SpO2: 97 % (10/06/17 0714)  Exam:   Physical Exam  (   General Appearance:    Alert, cooperative, no distress, appears stated age                               Lungs:     Clear to auscultation bilaterally, respirations unlabored       Heart:    Regular rate and rhythm, S1 and S2 normal, no murmur, rub    or gallop   Abdomen:     Soft, non-tender, bowel sounds active all four quadrants,     no masses, no organomegaly           Extremities:   Extremities normal, atraumatic, no cyanosis or edema     Discussion with Family:  Will discuss with daughter    Discharge instructions/Information to patient and family:   See after visit summary for information provided to patient and family  Provisions for Follow-Up Care:  See after visit summary for information related to follow-up care and any pertinent home health orders  Disposition:     Other [facility]       For Discharges to [facility] SN[facility]:   · Not Applicable to this Patient - Not Applicable to this Patient    Planned Readmission:  None anticipated     Discharge Statement:  I spent 20 minutes discharging the patient  This time was spent on the day of discharge  I had direct contact with the patient on the day of discharge  Greater than 50% of the total time was spent examining patient, answering all patient questions, arranging and discussing plan of care with patient as well as directly providing post-discharge instructions  Additional time then spent on discharge activities  Discharge Medications:  See after visit summary for reconciled discharge medications provided to patient and family        ** Please Note: This note has been constructed using a voice recognition system **

## 2017-10-09 ENCOUNTER — GENERIC CONVERSION - ENCOUNTER (OUTPATIENT)
Dept: OTHER | Facility: OTHER | Age: 82
End: 2017-10-09

## 2017-10-10 NOTE — DISCHARGE SUMMARY
Please look at my previous discharge summary for complete course     The patient did have dehydration as evidenced by the low BMI in hemo concentration

## 2017-10-23 ENCOUNTER — GENERIC CONVERSION - ENCOUNTER (OUTPATIENT)
Dept: OTHER | Facility: OTHER | Age: 82
End: 2017-10-23

## 2017-11-14 ENCOUNTER — ALLSCRIPTS OFFICE VISIT (OUTPATIENT)
Dept: OTHER | Facility: OTHER | Age: 82
End: 2017-11-14

## 2018-01-12 NOTE — MISCELLANEOUS
Assessment    1  Dementia (294 20) (F03 90)   2  Depression (311) (F32 9)   3  Hypertension (401 9) (I10)   4  Urinary incontinence (788 30) (R32)    Plan  Depression    · Sertraline HCl - 25 MG Oral Tablet   Rx By: Elizabeth Valdez; Dispense: 90 Days ; #:90 Tablet; Refill: 1; For: Depression; SHAYY = N; Sent To: [id number] Last Updated By: Rush Osullivan; 11/14/2017 11:20:37 AM  Flu vaccine need    · Fluzone High-Dose 0 5 ML Intramuscular Suspension Prefilled Syringe   For: Flu vaccine need; Ordered By:Augusto Joe; Effective Date:14Nov2017; Administered by: Lukas Lat: 11/14/2017 11:46:00 AM; Last Updated By: Lukas Srivastava; 11/14/2017 11:47:25 AM  Need for pneumococcal vaccination    · Prevnar 13 Intramuscular Suspension   For: Need for pneumococcal vaccination; Ordered By:Augusto Joe; Effective Date:14Nov2017; Administered by: Lukas Lat: 11/14/2017 11:47:00 AM; Last Updated By: Lukas Srivastava; 11/14/2017 11:47:25 AM  Vitamin D deficiency    · Vitamin D (Ergocalciferol) 37817 UNIT Oral Capsule   Rx By: Elizabeth Valdez; Dispense: 56 Days ; #:8 Capsule; Refill: 0; For: Vitamin D deficiency; SHAYY = N; Sent To: [facility] Last Updated By: Rush Osullivan; 11/14/2017 11:22:32 AM    As above  Discussion/Summary  Discussion Summary:     1  Hypertension-controlled  2  Diarrhea-resolved   3  Dementia-progressing  It appears now that there are safety issues and should not be left alone at home  Will discuss alternative arrangements with daughter  She may benefit from adult   History of Present Illness  [facility]CM Communication [facility]: The patient is being contacted for follow-up after hospitalization and [facility] records were not available  The date of discharge: 11/12/17  She was discharged to home  Medications were not reviewed today  She scheduled a follow up appointment  Counseling was provided to patient's family     Communication performed and completed by km   HPI: Negra Hartman was hospitalized in October for diarrhea  Colonoscopy was done with no significant abnormalities  No etiology was found and the diarrhea improved  She was transferred to [facility] for rehab  She did PT and OT there  No significant problems  CT scan while hospitalized showed some bibasilar reticular opacities in the lungs  There was also a benign-appearing nodule in the adrenal gland  Negra Hartman was discharged from [facility] 4 days ago  She is now living with her daughter  Daughter notes progression of her dementia  She is unable to use any appliances  She gets confused easily  She becomes easily irritated and weepy  Daughter works 10 hours a day and is afraid to leave her at home along  Active Problems    1  Change in bowel habits (787 99) (R19 4)   2  Dementia (294 20) (F03 90)   3  Depression (311) (F32 9)   4  Encounter for screening for lipid disorder (V77 91) (Z13 220)   5  Encounter for vitamin deficiency screening (V77 99) (Z13 21)   6  Hearing loss (389 9) (H91 90)   7  Hypertension (401 9) (I10)   8  Influenza vaccination administered at current visit (V04 81) (Z23)   9  Osteoarthritis of knee (715 36) (M17 10)   10  Thyroid disorder screen (V77 0) (Z13 29)   11  Urinary incontinence (788 30) (R32)   12  Vitamin D deficiency (268 9) (E55 9)    Surgical History    1  History of Cataract Surgery   2  History of Hysterectomy    Family History  Father    1  Family history of hypertension (V17 49) (Z82 49)    Social History    · Denied: History of Alcohol use   · Caffeine use (V49 89) (F15 90)   · Current every day smoker (305 1) (F17 200)    Current Meds   1  Adult Low Dose Aspirin 81 MG TABS; Therapy: (Recorded:94Sui3087) to Recorded   2  AmLODIPine Besylate 5 MG Oral Tablet; TAKE 1 TABLET DAILY FOR BLOOD PRESSURE    Requested for: 10Apr2017; Last Rx:10Apr2017 Ordered   3  Dicyclomine HCl - 10 MG Oral Capsule;    Therapy: (Recorded:14Nov2017) to Recorded 4  Donepezil HCl - 10 MG Oral Tablet; TAKE 1 TABLET DAILY  Requested for: 11NVL9283;   Last TB:14OBK7787 Ordered   5  Florastor 250 MG PACK; Therapy: (Recorded:14Nov2017) to Recorded   6  Sertraline HCl - 25 MG Oral Tablet; TAKE 1 TABLET DAILY; Therapy: 43BAS3244 to (Evaluate:73Iim6854)  Requested for: 83RCC5991; Last   Rx:04Gga1298 Ordered   7  Sertraline HCl - 50 MG Oral Tablet; Therapy: (Recorded:14Nov2017) to Recorded   8  Vitamin D (Ergocalciferol) 97950 UNIT Oral Capsule; TAKE 1 CAPSULE Weekly; Therapy: 54EID7598 to (Evaluate:24Apr2017)  Requested for: 32WZE3913; Last   Rx:22Hfc4010 Ordered   9  Vitamin D3 TABS; Therapy: (Recorded:14Nov2017) to Recorded    Allergies    1  Penicillins    Vitals  Signs   Recorded: 09AYN5927 11:19AM   Heart Rate: 90  Respiration: 20  Systolic: 151  Diastolic: 70  Height: 5 ft 1 in  Weight: 80 lb 8 oz  BMI Calculated: 15 21  BSA Calculated: 1 28    Physical Exam    Constitutional   General appearance: No acute distress, well appearing and well nourished  Neck   Neck: Supple, symmetric, trachea midline, no masses  Thyroid: Normal, no thyromegaly  Pulmonary   Respiratory effort: No increased work of breathing or signs of respiratory distress  Auscultation of lungs: Clear to auscultation  Cardiovascular   Auscultation of heart: Normal rate and rhythm, normal S1 and S2, no murmurs  Carotid pulses: 2+ bilaterally  Abdominal aorta: Normal     Femoral pulses: 2+ bilaterally  Pedal pulses: 2+ bilaterally  Peripheral vascular exam: Normal     Examination of extremities for edema and/or varicosities: Normal     Abdomen   Abdomen: Non-tender, no masses  Liver and spleen: No hepatomegaly or splenomegaly        Signatures   Electronically signed by : ROMARIO Sim ; Nov 14 2017  8:05PM EST                       (Author)

## 2018-01-13 VITALS
DIASTOLIC BLOOD PRESSURE: 70 MMHG | HEIGHT: 61 IN | BODY MASS INDEX: 15.2 KG/M2 | RESPIRATION RATE: 20 BRPM | WEIGHT: 80.5 LBS | SYSTOLIC BLOOD PRESSURE: 128 MMHG | HEART RATE: 90 BPM

## 2018-01-13 VITALS
WEIGHT: 80 LBS | RESPIRATION RATE: 18 BRPM | SYSTOLIC BLOOD PRESSURE: 140 MMHG | HEART RATE: 80 BPM | DIASTOLIC BLOOD PRESSURE: 68 MMHG

## 2018-01-14 VITALS
HEART RATE: 100 BPM | SYSTOLIC BLOOD PRESSURE: 140 MMHG | WEIGHT: 82.25 LBS | DIASTOLIC BLOOD PRESSURE: 72 MMHG | RESPIRATION RATE: 18 BRPM

## 2018-01-16 NOTE — MISCELLANEOUS
History of Present Illness  TCM Communication [facility]: The patient is being contacted for follow-up after hospitalization  Hospital records were reviewed  She was hospitalized at [facility]  The dates of hospitalization: 10/2/2017-10/6/2017  Diagnosis: DIARRHEA  She was discharged to a nursing home, to a rehabilitation center, Pt  is in [facility] for rehabilitation planned for 2 weeks  She did not schedule a follow up appointment  Follow-up appointments with other specialists: MESSAGE LEFT FOR FAMILY TO CALL OFFICE  Communication performed and completed by [facility]      Active Problems    1  Dementia (294 20) (F03 90)   2  Depression (311) (F32 9)   3  Encounter for screening for lipid disorder (V77 91) (Z13 220)   4  Encounter for vitamin deficiency screening (V77 99) (Z13 21)   5  Hearing loss (389 9) (H91 90)   6  Hypertension (401 9) (I10)   7  Influenza vaccination administered at current visit (V04 81) (Z23)   8  Osteoarthritis of knee (715 36) (M17 10)   9  Thyroid disorder screen (V77 0) (Z13 29)   10  Urinary incontinence (788 30) (R32)   11  Vitamin D deficiency (268 9) (E55 9)    Surgical History    1  History of Cataract Surgery   2  History of Hysterectomy    Family History  Father    1  Family history of hypertension (V17 49) (Z82 49)    Social History    · Denied: History of Alcohol use   · Caffeine use (V49 89) (F15 90)   · Current every day smoker (305 1) (F17 200)    Current Meds   1  Adult Low Dose Aspirin 81 MG TABS; Therapy: (Recorded:69Jfk9026) to Recorded   2  AmLODIPine Besylate 5 MG Oral Tablet; TAKE 1 TABLET DAILY FOR BLOOD PRESSURE    Requested for: 76Qrk1238; Last Rx:00Vty1773 Ordered   3  Donepezil HCl - 10 MG Oral Tablet; TAKE 1 TABLET DAILY  Requested for: 30WDN2645;   Last HA:58EKD8166 Ordered   4  Sertraline HCl - 25 MG Oral Tablet; TAKE 1 TABLET DAILY; Therapy: 48WUF8977 to (Evaluate:81Jos1286)  Requested for: 81WFB7019; Last   Rx:47Hrb3468 Ordered   5   Vitamin D (Ergocalciferol) 10449 UNIT Oral Capsule; TAKE 1 CAPSULE Weekly; Therapy: 06SYS3782 to (Evaluate:24Apr2017)  Requested for: 23QFI3967; Last   Rx:31Zzz3811 Ordered    Allergies    1  Penicillins    Future Appointments    Date/Time Provider Specialty Site   11/02/2017 02:15 PM ROMARIO Do   [address] [facility]     Signatures   Electronically signed by : ROMARIO Taylor ; Oct 15 2017  9:31PM EST

## 2018-01-18 NOTE — RESULT NOTES
measuring 0 3   cm in greatest dimension  Entirely submitted  One cassette  Note: The estimated total formalin fixation time based upon information   provided by the submitting clinician and the standard processing schedule   is under 72 hours   MAS   LAB AP CLINICAL INFORMATION      Cold bx r/o microscopic colitis   Cold bx r/o microscopic colitis

## 2018-04-09 ENCOUNTER — TELEPHONE (OUTPATIENT)
Dept: FAMILY MEDICINE CLINIC | Facility: MEDICAL CENTER | Age: 83
End: 2018-04-09

## 2018-04-09 NOTE — TELEPHONE ENCOUNTER
Jaydon Krueger had a CT abdomen pelvis with contrast on 10/2/2017  Follow up was recommended for 3 months for the #1 Impression Bibasilar reticulonodular opacities but not stated in the report  Therefore this was never done  We received the notice from Xray to f/up concerning this finding with our patient  Please address?

## 2018-04-10 NOTE — TELEPHONE ENCOUNTER
LMOM for patient's daughter  to call the office  Need to discuss f/up that was recommended on this previous study  Does she want to address this issue?

## 2018-04-11 NOTE — TELEPHONE ENCOUNTER
I spoke with [facility], she said she would like to get this repeated  Please place the order  Also wants us to call her on her cell  [phone]

## 2018-04-12 DIAGNOSIS — R09.89 ABNORMAL FINDING OF LUNG: Primary | ICD-10-CM

## 2018-04-12 NOTE — TELEPHONE ENCOUNTER
I spoke with Grzegorz Merritt in [address] and she says it would be CT Chest with Contrast for the opacities and infection  I will try and  place an order in her chart and call Komal Burch

## 2018-04-12 NOTE — TELEPHONE ENCOUNTER
I was not able to get a diagnosis connected to the CT order  I have asked Dr Patti Carter to choose one that comes up and sign order so we can call Rakel Pino

## 2018-04-12 NOTE — PROGRESS NOTES
I tried to place a diagnosis in for a reason for this exam and could not get one to stick  Please sign this order and it will prompt you to choose a reason for it  I did free text a reason in, but there is still a hard stop before it will print this order

## 2018-04-12 NOTE — TELEPHONE ENCOUNTER
OK--let Arcelia know that I also do not think that this is going to be anything very significant anyway

## 2018-04-16 DIAGNOSIS — R91.8 OPACITY OF LUNG ON IMAGING STUDY: Primary | ICD-10-CM

## 2018-04-18 ENCOUNTER — TELEPHONE (OUTPATIENT)
Dept: FAMILY MEDICINE CLINIC | Facility: MEDICAL CENTER | Age: 83
End: 2018-04-18

## 2018-04-18 DIAGNOSIS — I10 ESSENTIAL HYPERTENSION: Primary | ICD-10-CM

## 2018-04-18 NOTE — TELEPHONE ENCOUNTER
Message left on Augusto VM order is in Sierra's chart, use [facility] lab draw station at least 2 days prior to CT scan

## 2018-04-18 NOTE — TELEPHONE ENCOUNTER
RICHMOND CALLED TO SCHEDULE THE CT SCAN, BUT THEY TOLD HER THAT SINCE HER MOTHER IS OVER 60 SHE'LL NEED BW

## 2018-06-01 ENCOUNTER — APPOINTMENT (INPATIENT)
Dept: CT IMAGING | Facility: HOSPITAL | Age: 83
DRG: 178 | End: 2018-06-01
Payer: MEDICARE

## 2018-06-01 ENCOUNTER — APPOINTMENT (EMERGENCY)
Dept: RADIOLOGY | Facility: HOSPITAL | Age: 83
DRG: 178 | End: 2018-06-01
Payer: MEDICARE

## 2018-06-01 ENCOUNTER — HOSPITAL ENCOUNTER (INPATIENT)
Facility: HOSPITAL | Age: 83
LOS: 5 days | Discharge: HOME HEALTH | DRG: 178 | End: 2018-06-06
Attending: EMERGENCY MEDICINE | Admitting: FAMILY MEDICINE
Payer: MEDICARE

## 2018-06-01 DIAGNOSIS — Z86.11 HISTORY OF TB (TUBERCULOSIS): ICD-10-CM

## 2018-06-01 DIAGNOSIS — F02.80 DEMENTIA ASSOCIATED WITH OTHER UNDERLYING DISEASE WITHOUT BEHAVIORAL DISTURBANCE (HCC): ICD-10-CM

## 2018-06-01 DIAGNOSIS — R04.2 HEMOPTYSIS: Primary | ICD-10-CM

## 2018-06-01 PROBLEM — F03.90 DEMENTIA (HCC): Status: ACTIVE | Noted: 2018-06-01

## 2018-06-01 PROBLEM — I10 HYPERTENSION: Status: ACTIVE | Noted: 2018-06-01

## 2018-06-01 LAB
ALBUMIN SERPL BCP-MCNC: 2.8 G/DL (ref 3.5–5)
ALP SERPL-CCNC: 93 U/L (ref 46–116)
ALT SERPL W P-5'-P-CCNC: 12 U/L (ref 12–78)
ANION GAP SERPL CALCULATED.3IONS-SCNC: 8 MMOL/L (ref 4–13)
APTT PPP: 30 SECONDS (ref 24–36)
AST SERPL W P-5'-P-CCNC: 24 U/L (ref 5–45)
BASOPHILS # BLD AUTO: 0.07 THOUSANDS/ΜL (ref 0–0.1)
BASOPHILS NFR BLD AUTO: 1 % (ref 0–1)
BILIRUB SERPL-MCNC: 0.5 MG/DL (ref 0.2–1)
BUN SERPL-MCNC: 18 MG/DL (ref 5–25)
CALCIUM SERPL-MCNC: 8.3 MG/DL (ref 8.3–10.1)
CHLORIDE SERPL-SCNC: 104 MMOL/L (ref 100–108)
CO2 SERPL-SCNC: 28 MMOL/L (ref 21–32)
CREAT SERPL-MCNC: 1.08 MG/DL (ref 0.6–1.3)
EOSINOPHIL # BLD AUTO: 0.1 THOUSAND/ΜL (ref 0–0.61)
EOSINOPHIL NFR BLD AUTO: 2 % (ref 0–6)
ERYTHROCYTE [DISTWIDTH] IN BLOOD BY AUTOMATED COUNT: 15.2 % (ref 11.6–15.1)
GFR SERPL CREATININE-BSD FRML MDRD: 46 ML/MIN/1.73SQ M
GLUCOSE SERPL-MCNC: 93 MG/DL (ref 65–140)
HCT VFR BLD AUTO: 43.3 % (ref 34.8–46.1)
HGB BLD-MCNC: 14 G/DL (ref 11.5–15.4)
IMM GRANULOCYTES # BLD AUTO: 0.05 THOUSAND/UL (ref 0–0.2)
IMM GRANULOCYTES NFR BLD AUTO: 1 % (ref 0–2)
INR PPP: 1.03 (ref 0.86–1.17)
LACTATE SERPL-SCNC: 1 MMOL/L (ref 0.5–2)
LIPASE SERPL-CCNC: 123 U/L (ref 73–393)
LYMPHOCYTES # BLD AUTO: 1.16 THOUSANDS/ΜL (ref 0.6–4.47)
LYMPHOCYTES NFR BLD AUTO: 17 % (ref 14–44)
MAGNESIUM SERPL-MCNC: 2.3 MG/DL (ref 1.6–2.6)
MCH RBC QN AUTO: 31.7 PG (ref 26.8–34.3)
MCHC RBC AUTO-ENTMCNC: 32.3 G/DL (ref 31.4–37.4)
MCV RBC AUTO: 98 FL (ref 82–98)
MONOCYTES # BLD AUTO: 0.4 THOUSAND/ΜL (ref 0.17–1.22)
MONOCYTES NFR BLD AUTO: 6 % (ref 4–12)
NEUTROPHILS # BLD AUTO: 5 THOUSANDS/ΜL (ref 1.85–7.62)
NEUTS SEG NFR BLD AUTO: 73 % (ref 43–75)
NRBC BLD AUTO-RTO: 0 /100 WBCS
PLATELET # BLD AUTO: 254 THOUSANDS/UL (ref 149–390)
PMV BLD AUTO: 9.6 FL (ref 8.9–12.7)
POTASSIUM SERPL-SCNC: 5.4 MMOL/L (ref 3.5–5.3)
PROT SERPL-MCNC: 7.1 G/DL (ref 6.4–8.2)
PROTHROMBIN TIME: 13.4 SECONDS (ref 11.8–14.2)
RBC # BLD AUTO: 4.42 MILLION/UL (ref 3.81–5.12)
SODIUM SERPL-SCNC: 140 MMOL/L (ref 136–145)
TROPONIN I SERPL-MCNC: <0.02 NG/ML
WBC # BLD AUTO: 6.78 THOUSAND/UL (ref 4.31–10.16)

## 2018-06-01 PROCEDURE — 83735 ASSAY OF MAGNESIUM: CPT | Performed by: EMERGENCY MEDICINE

## 2018-06-01 PROCEDURE — 84484 ASSAY OF TROPONIN QUANT: CPT | Performed by: EMERGENCY MEDICINE

## 2018-06-01 PROCEDURE — 85730 THROMBOPLASTIN TIME PARTIAL: CPT | Performed by: EMERGENCY MEDICINE

## 2018-06-01 PROCEDURE — 85025 COMPLETE CBC W/AUTO DIFF WBC: CPT | Performed by: EMERGENCY MEDICINE

## 2018-06-01 PROCEDURE — 94760 N-INVAS EAR/PLS OXIMETRY 1: CPT

## 2018-06-01 PROCEDURE — 99223 1ST HOSP IP/OBS HIGH 75: CPT | Performed by: INTERNAL MEDICINE

## 2018-06-01 PROCEDURE — 71045 X-RAY EXAM CHEST 1 VIEW: CPT

## 2018-06-01 PROCEDURE — 85610 PROTHROMBIN TIME: CPT | Performed by: EMERGENCY MEDICINE

## 2018-06-01 PROCEDURE — 99285 EMERGENCY DEPT VISIT HI MDM: CPT

## 2018-06-01 PROCEDURE — 80053 COMPREHEN METABOLIC PANEL: CPT | Performed by: EMERGENCY MEDICINE

## 2018-06-01 PROCEDURE — 36415 COLL VENOUS BLD VENIPUNCTURE: CPT | Performed by: EMERGENCY MEDICINE

## 2018-06-01 PROCEDURE — 83605 ASSAY OF LACTIC ACID: CPT | Performed by: EMERGENCY MEDICINE

## 2018-06-01 PROCEDURE — 94664 DEMO&/EVAL PT USE INHALER: CPT

## 2018-06-01 PROCEDURE — 83690 ASSAY OF LIPASE: CPT | Performed by: EMERGENCY MEDICINE

## 2018-06-01 PROCEDURE — 87040 BLOOD CULTURE FOR BACTERIA: CPT | Performed by: EMERGENCY MEDICINE

## 2018-06-01 RX ORDER — ACETAMINOPHEN 325 MG/1
650 TABLET ORAL EVERY 6 HOURS PRN
Status: DISCONTINUED | OUTPATIENT
Start: 2018-06-01 | End: 2018-06-06 | Stop reason: HOSPADM

## 2018-06-01 RX ORDER — NICOTINE 21 MG/24HR
1 PATCH, TRANSDERMAL 24 HOURS TRANSDERMAL DAILY
Status: DISCONTINUED | OUTPATIENT
Start: 2018-06-02 | End: 2018-06-06 | Stop reason: HOSPADM

## 2018-06-01 RX ORDER — AMLODIPINE BESYLATE 5 MG/1
5 TABLET ORAL DAILY
Status: DISCONTINUED | OUTPATIENT
Start: 2018-06-02 | End: 2018-06-06 | Stop reason: HOSPADM

## 2018-06-01 RX ORDER — 0.9 % SODIUM CHLORIDE 0.9 %
3 VIAL (ML) INJECTION AS NEEDED
Status: DISCONTINUED | OUTPATIENT
Start: 2018-06-01 | End: 2018-06-06 | Stop reason: HOSPADM

## 2018-06-01 RX ORDER — SODIUM CHLORIDE 9 MG/ML
75 INJECTION, SOLUTION INTRAVENOUS CONTINUOUS
Status: DISCONTINUED | OUTPATIENT
Start: 2018-06-01 | End: 2018-06-05

## 2018-06-01 RX ORDER — ONDANSETRON 2 MG/ML
4 INJECTION INTRAMUSCULAR; INTRAVENOUS EVERY 6 HOURS PRN
Status: DISCONTINUED | OUTPATIENT
Start: 2018-06-01 | End: 2018-06-06 | Stop reason: HOSPADM

## 2018-06-01 RX ADMIN — SODIUM CHLORIDE 75 ML/HR: 0.9 INJECTION, SOLUTION INTRAVENOUS at 20:08

## 2018-06-01 NOTE — ASSESSMENT & PLAN NOTE
Patient does have prior history of tuberculosis and was treated 4 years ago  Patient is current active smoker  My differentials-recurrent TB versus pneumonia versus bronchitis versus malignancy    ED physician discussed with Infectious Diseases who recommended to collect AFB smear x3 every 8 hr and continue supportive care at this time    Will order CT chest, rule out any mass or malignancy  Consult pulmonology and ID for further evaluation of hemoptysis

## 2018-06-01 NOTE — ASSESSMENT & PLAN NOTE
Patient's daughter reports that patient is not taking her blood pressure medications regularly    Continue home dose of amlodipine and continue to monitor blood pressure

## 2018-06-01 NOTE — ED NOTES
Pt given sputum container  Pt unable to cough up sputum at this time        Ani Ferrari, RN  06/01/18 0673

## 2018-06-01 NOTE — ED NOTES
Requested medical records from [facility], [address] [place] [address]  06/01/18 6078

## 2018-06-01 NOTE — PLAN OF CARE
Problem: RESPIRATORY - ADULT  Goal: Achieves optimal ventilation and oxygenation  INTERVENTIONS:  - Assess for changes in respiratory status  - Assess for changes in mentation and behavior  - Position to facilitate oxygenation and minimize respiratory effort  - Oxygen administration by appropriate delivery method based on oxygen saturation (per order) or ABGs  - Initiate smoking cessation education as indicated  - Encourage broncho-pulmonary hygiene including cough, deep breathe, Incentive Spirometry  - Assess the need for suctioning and aspirate as needed  - Assess and instruct to report SOB or any respiratory difficulty  - Respiratory Therapy support as indicated  Outcome: Progressing      Problem: INFECTION - ADULT  Goal: Absence or prevention of progression during hospitalization  INTERVENTIONS:  - Assess and monitor for signs and symptoms of infection  - Monitor lab/diagnostic results  - Monitor all insertion sites, i e  indwelling lines, tubes, and drains  - Monitor endotracheal (as able) and nasal secretions for changes in amount and color  - Currie appropriate cooling/warming therapies per order  - Administer medications as ordered  - Instruct and encourage patient and family to use good hand hygiene technique  - Identify and instruct in appropriate isolation precautions for identified infection/condition  Outcome: Progressing      Problem: SAFETY ADULT  Goal: Patient will remain free of falls  INTERVENTIONS:  - Assess patient frequently for physical needs  -  Identify cognitive and physical deficits and behaviors that affect risk of falls    -  Currie fall precautions as indicated by assessment   - Educate patient/family on patient safety including physical limitations  - Instruct patient to call for assistance with activity based on assessment  - Modify environment to reduce risk of injury  - Consider OT/PT consult to assist with strengthening/mobility  Outcome: Progressing      Problem: DISCHARGE PLANNING  Goal: Discharge to home or other facility with appropriate resources  INTERVENTIONS:  - Identify barriers to discharge w/patient and caregiver  - Arrange for needed discharge resources and transportation as appropriate  - Identify discharge learning needs (meds, wound care, etc )  - Arrange for interpretive services to assist at discharge as needed  - Refer to Case Management Department for coordinating discharge planning if the patient needs post-hospital services based on physician/advanced practitioner order or complex needs related to functional status, cognitive ability, or social support system  Outcome: Progressing      Problem: Knowledge Deficit  Goal: Patient/family/caregiver demonstrates understanding of disease process, treatment plan, medications, and discharge instructions  Complete learning assessment and assess knowledge base    Interventions:  - Provide teaching at level of understanding  - Provide teaching via preferred learning methods  Outcome: Progressing

## 2018-06-01 NOTE — ED PROVIDER NOTES
History  Chief Complaint   Patient presents with    Cough     pt coughing up blood on tuesday and told to come here for r/o TB, hx of TB 3 yrs ago     42-year-old female patient with significant dementia comes to the emergency department for evaluation of hemoptysis  According to the patient's daughter Ashok Bustos the patient does have a history of tuberculosis was treated for this approximately four years ago  At that time the patient is living by herself, was treated at [facility] and discharged without difficulty  The patient had not been followed up since then  Over the past several days, the patient was noted to have mild coughing episode followed by some blood in her sputum  The patient the initially reported this and nine distant reported again the daughter noted today and brought her here for evaluation  Currently, the patient is lying in bed, in reverse isolation for rule out tuberculosis, no apparent distress, speaking in full and interrupted sentences, has no adventitious breath sounds, has skin which is pink warm and dry  The patient is generally nonseptic in appearance  Patient will be evaluated for recurrence of tuberculosis, bronchitis, pneumonia  I did discuss this with the patient's daughter  Patient's daughter also some concern that the mother has been more weak and fatigued over the last several days  History provided by:  Relative   used: No    Cough   Cough characteristics:  Productive  Sputum characteristics:  Bloody  Severity:  Mild  Onset quality:  Gradual  Timing:  Constant  Progression:  Worsening  Chronicity:  New  Smoker: no    Context: not animal exposure    Relieved by:  Nothing  Worsened by:  Nothing  Ineffective treatments:  None tried  Associated symptoms: no chills, no ear fullness, no fever, no headaches and no myalgias        Prior to Admission Medications   Prescriptions Last Dose Informant Patient Reported?  Taking?   acetaminophen (TYLENOL) 650 mg CR tablet Past Month at Unknown time  Yes Yes   Sig: Take 650 mg by mouth every 8 (eight) hours as needed for mild pain   amLODIPine (NORVASC) 5 mg tablet Past Month at Unknown time  Yes Yes   Sig: Take 5 mg by mouth daily   dicyclomine (BENTYL) 10 mg capsule Past Month at Unknown time  No Yes   Sig: Take 1 capsule by mouth 3 (three) times a day before meals   donepezil (ARICEPT) 10 mg tablet Past Month at Unknown time  Yes Yes   Sig: Take 10 mg by mouth daily   saccharomyces boulardii (FLORASTOR) 250 mg capsule Past Month at Unknown time  No Yes   Sig: Take 1 capsule by mouth 2 (two) times a day   sertraline (ZOLOFT) 50 mg tablet Past Month at Unknown time  Yes Yes   Sig: Take 50 mg by mouth daily      Facility-Administered Medications: None       Past Medical History:   Diagnosis Date    Dementia     Hypertension     Ovarian cyst        Past Surgical History:   Procedure Laterality Date    APPENDECTOMY      COLONOSCOPY      COLONOSCOPY N/A 10/5/2017    Procedure: COLONOSCOPY;  Surgeon: Cliff Arriaga MD;  Location: [facility]; Service: Gastroenterology    OVARIAN CYST REMOVAL         Family History   Problem Relation Age of Onset    No Known Problems Mother     No Known Problems Father      I have reviewed and agree with the history as documented  Social History   Substance Use Topics    Smoking status: Current Every Day Smoker     Packs/day: 1 00     Types: Cigarettes    Smokeless tobacco: Never Used    Alcohol use No        Review of Systems   Constitutional: Negative for chills and fever  Respiratory: Positive for cough  Musculoskeletal: Negative for myalgias  Neurological: Negative for headaches  All other systems reviewed and are negative  Physical Exam  Physical Exam   Constitutional: She is oriented to person, place, and time  She appears well-developed and well-nourished  HENT:   Head: Normocephalic and atraumatic     Right Ear: External ear normal    Left Ear: External ear normal    Eyes: Conjunctivae and EOM are normal    Neck: No JVD present  No tracheal deviation present  No thyromegaly present  Cardiovascular: Normal rate  Pulmonary/Chest: Effort normal and breath sounds normal  No stridor  Abdominal: Soft  She exhibits no distension and no mass  There is no tenderness  There is no guarding  No hernia  Musculoskeletal: Normal range of motion  She exhibits no edema, tenderness or deformity  Lymphadenopathy:     She has no cervical adenopathy  Neurological: She is alert and oriented to person, place, and time  Skin: Skin is warm  No rash noted  No erythema  No pallor  Psychiatric: She has a normal mood and affect  Her behavior is normal    Nursing note and vitals reviewed        Vital Signs  ED Triage Vitals   Temperature Pulse Respirations Blood Pressure SpO2   06/01/18 1310 06/01/18 1310 06/01/18 1310 06/01/18 1310 06/01/18 1310   98 7 °F (37 1 °C) 83 18 163/69 98 %      Temp Source Heart Rate Source Patient Position - Orthostatic VS BP Location FiO2 (%)   06/01/18 1700 -- 06/01/18 1700 06/01/18 1700 --   Oral  Sitting Left arm       Pain Score       06/01/18 1310       No Pain           Vitals:    06/01/18 1904 06/01/18 2300 06/02/18 0806 06/02/18 1500   BP:  116/62 148/65 137/60   Pulse: 77 72 72 84   Patient Position - Orthostatic VS:  Lying Lying Sitting       Visual Acuity      ED Medications  Medications   sodium chloride (PF) 0 9 % injection 3 mL (not administered)   amLODIPine (NORVASC) tablet 5 mg (5 mg Oral Given 6/2/18 1011)   ondansetron (ZOFRAN) injection 4 mg (not administered)   nicotine (NICODERM CQ) 14 mg/24hr TD 24 hr patch 1 patch (1 patch Transdermal Medication Applied 6/2/18 1011)   acetaminophen (TYLENOL) tablet 650 mg (not administered)   sodium chloride 0 9 % infusion (75 mL/hr Intravenous New Bag 6/2/18 1019)   iodixanol (VISIPAQUE) 320 MG/ML injection 85 mL (85 mL Intravenous Given 6/2/18 1250)       Diagnostic Studies  Results Reviewed     Procedure Component Value Units Date/Time    Blood culture [[id number]] Collected:  06/01/18 1352    Lab Status:  Preliminary result Specimen:  Blood from Arm, Left Updated:  06/02/18 1901     Blood Culture No Growth at 24 hrs  Blood culture [[id number]] Collected:  06/01/18 1351    Lab Status:  Preliminary result Specimen:  Blood from Arm, Right Updated:  06/02/18 1901     Blood Culture No Growth at 24 hrs  AFB Culture with Stain [[id number]]     Lab Status:  No result Specimen:  Other from Induced Sputum     Lactate Blood [[id number]]  (Normal) Collected:  06/01/18 1351    Lab Status:  Final result Specimen:  Blood from Arm, Right Updated:  06/01/18 1428     LACTIC ACID 1 0 mmol/L     Narrative:         Result may be elevated if tourniquet was used during collection  Comprehensive metabolic panel [[id number]]  (Abnormal) Collected:  06/01/18 1351    Lab Status:  Final result Specimen:  Blood from Arm, Right Updated:  06/01/18 1426     Sodium 140 mmol/L      Potassium 5 4 (H) mmol/L      Chloride 104 mmol/L      CO2 28 mmol/L      Anion Gap 8 mmol/L      BUN 18 mg/dL      Creatinine 1 08 mg/dL      Glucose 93 mg/dL      Calcium 8 3 mg/dL      AST 24 U/L      ALT 12 U/L      Alkaline Phosphatase 93 U/L      Total Protein 7 1 g/dL      Albumin 2 8 (L) g/dL      Total Bilirubin 0 50 mg/dL      eGFR 46 ml/min/1 73sq m     Narrative:         National Kidney Disease Education Program recommendations are as follows:  GFR calculation is accurate only with a steady state creatinine  Chronic Kidney disease less than 60 ml/min/1 73 sq  meters  Kidney failure less than 15 ml/min/1 73 sq  meters      Lipase [[id number]]  (Normal) Collected:  06/01/18 1351    Lab Status:  Final result Specimen:  Blood from Arm, Right Updated:  06/01/18 1426     Lipase 123 u/L     Magnesium [[id number]]  (Normal) Collected:  06/01/18 1351    Lab Status:  Final result Specimen:  Blood from Arm, Right Updated:  06/01/18 1426 Magnesium 2 3 mg/dL     Troponin I [[id number]]  (Normal) Collected:  06/01/18 1351    Lab Status:  Final result Specimen:  Blood from Arm, Right Updated:  06/01/18 1423     Troponin I <0 02 ng/mL     Narrative:         [employer] Chemistry analyzer 99% cutoff is > 0 04 ng/mL in network labs    o cTnI 99% cutoff is useful only when applied to patients in the clinical setting of myocardial ischemia  o cTnI 99% cutoff should be interpreted in the context of clinical history, ECG findings and possibly cardiac imaging to establish correct diagnosis  o cTnI 99% cutoff may be suggestive but clearly not indicative of a coronary event without the clinical setting of myocardial ischemia      Protime-INR [[id number]]  (Normal) Collected:  06/01/18 1351    Lab Status:  Final result Specimen:  Blood from Arm, Right Updated:  06/01/18 1416     Protime 13 4 seconds      INR 1 03    APTT [[id number]]  (Normal) Collected:  06/01/18 1351    Lab Status:  Final result Specimen:  Blood from Arm, Right Updated:  06/01/18 1416     PTT 30 seconds     CBC and differential [[id number]]  (Abnormal) Collected:  06/01/18 1351    Lab Status:  Final result Specimen:  Blood from Arm, Right Updated:  06/01/18 1400     WBC 6 78 Thousand/uL      RBC 4 42 Million/uL      Hemoglobin 14 0 g/dL      Hematocrit 43 3 %      MCV 98 fL      MCH 31 7 pg      MCHC 32 3 g/dL      RDW 15 2 (H) %      MPV 9 6 fL      Platelets 276 Thousands/uL      nRBC 0 /100 WBCs      Neutrophils Relative 73 %      Immat GRANS % 1 %      Lymphocytes Relative 17 %      Monocytes Relative 6 %      Eosinophils Relative 2 %      Basophils Relative 1 %      Neutrophils Absolute 5 00 Thousands/µL      Immature Grans Absolute 0 05 Thousand/uL      Lymphocytes Absolute 1 16 Thousands/µL      Monocytes Absolute 0 40 Thousand/µL      Eosinophils Absolute 0 10 Thousand/µL      Basophils Absolute 0 07 Thousands/µL                  CT chest w contrast   Final Result by Frances Hayden MD (06/02 1327)      There is bilateral apical scarring  There is also diffuse bronchiectasis throughout the lung  These are typical of chronic findings of tuberculosis  There is no evidence of airspace consolidation to suggest active tuberculosis  There is a 1 cm spiculated left lower lobe pulmonary nodule (series 2 image 36 )  Consider PET/CT on a nonemergent basis, or follow-up chest CT to assess for likelihood of malignancy  Workstation performed: [id number]         XR chest portable   ED Interpretation by Juan Torres DO (06/01 6004)   Will ct for PE      Final Result by Rivka Chamorro MD (06/01 6225)   Addendum 1 of 1 by Rivka Chamorro MD (06/01 9995)   ADDENDUM:      The infection control nurse Carmen Garrett was made aware of these findings at    the time of this addendum  Final      Fine reticular nodular interstitial lung disease, finding of undetermined chronicity  Further clinical correlation recommended in this patient with a history of tuberculosis and new development of hemoptysis  The tuberculosis pathway was initiated              Workstation performed: [id number]                    Procedures  Procedures       Phone Contacts  ED Phone Contact    ED Course                               MDM  Number of Diagnoses or Management Options  Hemoptysis: new and requires workup     Amount and/or Complexity of Data Reviewed  Clinical lab tests: ordered and reviewed  Tests in the radiology section of CPT®: ordered and reviewed  Decide to obtain previous medical records or to obtain history from someone other than the patient: yes  Review and summarize past medical records: yes    Patient Progress  Patient progress: stable    CritCare Time    Disposition  Final diagnoses:   Hemoptysis     Time reflects when diagnosis was documented in both MDM as applicable and the Disposition within this note     Time User Action Codes Description Comment    6/1/2018  3:12 PM Nikunj Hollingsworth [R04 2] Hemoptysis     6/1/2018  4:53 PM Kamron Kamara Modify [R04 2] Hemoptysis     6/1/2018  4:55 PM Kamron Kamara Add [F17 200] Tobacco dependence     6/1/2018  4:55 PM Kamron Carias Modify [F17 200] Tobacco dependence     6/1/2018  4:55 PM Kamron Kamara Modify [F17 200] Tobacco dependence     6/1/2018  4:55 PM Kamron Kamara Remove [F17 200] Tobacco dependence     6/1/2018  4:55 PM Kamron Kamara Add [Z86 11] History of TB (tuberculosis)       ED Disposition     ED Disposition Condition Comment    Admit  Case was discussed with Mina Rice  and the patient's admission status was agreed to be Admission Status: inpatient status to the service of Dr Mina Rice   Follow-up Information    None         Current Discharge Medication List      CONTINUE these medications which have NOT CHANGED    Details   acetaminophen (TYLENOL) 650 mg CR tablet Take 650 mg by mouth every 8 (eight) hours as needed for mild pain      amLODIPine (NORVASC) 5 mg tablet Take 5 mg by mouth daily      dicyclomine (BENTYL) 10 mg capsule Take 1 capsule by mouth 3 (three) times a day before meals  Refills: 0      donepezil (ARICEPT) 10 mg tablet Take 10 mg by mouth daily      saccharomyces boulardii (FLORASTOR) 250 mg capsule Take 1 capsule by mouth 2 (two) times a day  Refills: 0      sertraline (ZOLOFT) 50 mg tablet Take 50 mg by mouth daily           No discharge procedures on file      ED Provider  Electronically Signed by           Jarocho Steel DO  06/02/18 5569

## 2018-06-01 NOTE — ASSESSMENT & PLAN NOTE
As per patient's daughter patient was treated for TB 40 years ago at [facility] in [place]    Records requested and pending  Check AFB smear x3  Continue negative isolation until cultures come back negative    Id consult pending

## 2018-06-01 NOTE — H&P
H&P- Nadira Sagastume [birth year], 80 y o  female MRN: [id number]    Unit/Bed#: -01 Encounter: [id number]    Primary Care Provider: Leonard Garcia MD   Date and time admitted to hospital: 6/1/2018  1:09 PM        Hemoptysis   Assessment & Plan    Patient does have prior history of tuberculosis and was treated 4 years ago  Patient is current active smoker  My differentials-recurrent TB versus pneumonia versus bronchitis versus malignancy    ED physician discussed with Infectious Diseases who recommended to collect AFB smear x3 every 8 hr and continue supportive care at this time  Will order CT chest, rule out any mass or malignancy  Consult pulmonology and ID for further evaluation of hemoptysis          Tobacco dependence   Assessment & Plan    Encourage cessation  Nicotine patch        Dementia   Assessment & Plan    Currently patient is alert, oriented x3  Encourage re- orientation, delirium precautions        Hypertension   Assessment & Plan    Patient's daughter reports that patient is not taking her blood pressure medications regularly  Continue home dose of amlodipine and continue to monitor blood pressure        History of TB (tuberculosis)   Assessment & Plan    As per patient's daughter patient was treated for TB 40 years ago at [facility] in [place]  Records requested and pending  Check AFB smear x3  Continue negative isolation until cultures come back negative    Id consult pending            VTE Prophylaxis: Held secondary to hemoptysis  / sequential compression device   Code Status:  Full code  POLST: There is no POLST form on file for this patient (pre-hospital)  Discussion with family:  Discussed with patient's daughter over the phone    Anticipated Length of Stay:  Patient will be admitted on an Inpatient basis with an anticipated length of stay of greater 2 midnights     Justification for Hospital Stay:  Evaluation of hemoptysis    Total Time for Visit, including Counseling / Coordination of Care: 30 minutes  Greater than 50% of this total time spent on direct patient counseling and coordination of care  Chief Complaint:   Hemoptysis    History of Present Illness: Lucie Restrepo is a 80 y o  female with past medical history of extensive smoking, history of tuberculosis status post treatment, dementia who presents for evaluation of hemoptysis  According to patient's daughter the patient does have a history of tuberculosis and was treated for this approximately 40 years ago at [facility]   Over past several days patient was noted to have mild coughing episode followed by some blood in her sputum  No reported fevers or chills  Patient's daughter reported some exertional shortness of breath worsening over last few days  Currently patient is in reverse isolation for tuberculosis, denies any complaints at this time  Denies any chest pain, cough, shortness of breath  X-ray of the chest showed fine reticular nodular interstitial lung disease, finding of undetermined chronicity  ED physician discussed with Infectious Diseases who recommended to admit the patient, collected AFB smears x3, and continue supportive management  Review of Systems:    Review of Systems   Constitutional: Negative for activity change, appetite change, chills, fever and unexpected weight change  HENT: Negative for postnasal drip and sore throat  Respiratory: Positive for cough and shortness of breath  Negative for chest tightness and wheezing  Cardiovascular: Negative for chest pain and palpitations  Gastrointestinal: Negative for abdominal pain  Musculoskeletal: Negative for arthralgias and neck pain  Skin: Negative for rash  Neurological: Negative for dizziness and syncope         Past Medical and Surgical History:     Past Medical History:   Diagnosis Date    Dementia     Hypertension     Ovarian cyst        Past Surgical History:   Procedure Laterality Date    APPENDECTOMY  COLONOSCOPY      COLONOSCOPY N/A 10/5/2017    Procedure: COLONOSCOPY;  Surgeon: Shadi Pollack MD;  Location: [facility]; Service: Gastroenterology    OVARIAN CYST REMOVAL         Meds/Allergies:    Prior to Admission medications    Medication Sig Start Date End Date Taking? Authorizing Provider   acetaminophen (TYLENOL) 650 mg CR tablet Take 650 mg by mouth every 8 (eight) hours as needed for mild pain   Yes Historical Provider, MD   amLODIPine (NORVASC) 5 mg tablet Take 5 mg by mouth daily   Yes Historical Provider, MD   dicyclomine (BENTYL) 10 mg capsule Take 1 capsule by mouth 3 (three) times a day before meals 10/6/17  Yes Gustavo Gillis MD   donepezil (ARICEPT) 10 mg tablet Take 10 mg by mouth daily   Yes Historical Provider, MD   saccharomyces boulardii (FLORASTOR) 250 mg capsule Take 1 capsule by mouth 2 (two) times a day 10/6/17  Yes Gustavo Gillis MD   sertraline (ZOLOFT) 50 mg tablet Take 50 mg by mouth daily   Yes Historical Provider, MD     I have reviewed home medications with patient personally  Allergies: Allergies   Allergen Reactions    Penicillins Other (See Comments)     Unknown reaction       Social History:     Marital Status:     Occupation:   Patient Pre-hospital Living Situation:  Lives with daughter  Patient Pre-hospital Level of Mobility:  Independent  Patient Pre-hospital Diet Restrictions:   Substance Use History:   History   Alcohol Use No     History   Smoking Status    Current Every Day Smoker    Packs/day: 1 00    Types: Cigarettes   Smokeless Tobacco    Never Used     History   Drug Use No       Family History:    Family History   Problem Relation Age of Onset    No Known Problems Mother     No Known Problems Father        Physical Exam:     Vitals:   Blood Pressure: 136/61 (06/01/18 1430)  Pulse: 62 (06/01/18 1430)  Temperature: 98 7 °F (37 1 °C) (06/01/18 1310)  Respirations: 17 (06/01/18 1430)  Height: 5' 2" (157 5 cm) (06/01/18 1310)  Weight - Scale: 38 8 kg (85 lb 9 6 oz) (06/01/18 1310)  SpO2: 95 % (06/01/18 1430)    Physical Exam   Constitutional: She is oriented to person, place, and time  She is cachectic appearing elderly female not in acute distress  HENT:   Head: Normocephalic and atraumatic  Eyes: EOM are normal  Pupils are equal, round, and reactive to light  Neck: Normal range of motion  Neck supple  Cardiovascular: Normal rate and regular rhythm  Pulmonary/Chest: Effort normal  No respiratory distress  She has rales  Abdominal: Soft  Bowel sounds are normal    Musculoskeletal: Normal range of motion  Neurological: She is alert and oriented to person, place, and time  Skin: Skin is warm and dry  Additional Data:     Lab Results: I have personally reviewed pertinent reports  Results from last 7 days  Lab Units 06/01/18  1351   WBC Thousand/uL 6 78   HEMOGLOBIN g/dL 14 0   HEMATOCRIT % 43 3   PLATELETS Thousands/uL 254   NEUTROS PCT % 73   LYMPHS PCT % 17   MONOS PCT % 6   EOS PCT % 2       Results from last 7 days  Lab Units 06/01/18  1351   SODIUM mmol/L 140   POTASSIUM mmol/L 5 4*   CHLORIDE mmol/L 104   CO2 mmol/L 28   BUN mg/dL 18   CREATININE mg/dL 1 08   CALCIUM mg/dL 8 3   TOTAL PROTEIN g/dL 7 1   BILIRUBIN TOTAL mg/dL 0 50   ALK PHOS U/L 93   ALT U/L 12   AST U/L 24   GLUCOSE RANDOM mg/dL 93       Results from last 7 days  Lab Units 06/01/18  1351   INR  1 03               Imaging: I have personally reviewed pertinent reports  XR chest portable   ED Interpretation by Mariama Holguin DO (06/01 3145)   Will ct for PE      Final Result by Nathaly Alston MD (06/01 9504)   Addendum 1 of 1 by Nathaly Alston MD (06/01 9947)   ADDENDUM:      The infection control nurse Hawa Beverly was made aware of these findings at    the time of this addendum  Final      Fine reticular nodular interstitial lung disease, finding of undetermined chronicity    Further clinical correlation recommended in this patient with a history of tuberculosis and new development of hemoptysis  The tuberculosis pathway was initiated  Workstation performed: [id number]             EKG, Pathology, and Other Studies Reviewed on Admission:   · EKG:     Allscripts / Epic Records Reviewed: Yes     ** Please Note: This note has been constructed using a voice recognition system   **

## 2018-06-02 ENCOUNTER — APPOINTMENT (INPATIENT)
Dept: CT IMAGING | Facility: HOSPITAL | Age: 83
DRG: 178 | End: 2018-06-02
Payer: MEDICARE

## 2018-06-02 PROBLEM — E46 MALNUTRITION (HCC): Status: ACTIVE | Noted: 2018-06-02

## 2018-06-02 PROBLEM — E87.5 HYPERKALEMIA: Status: ACTIVE | Noted: 2018-06-02

## 2018-06-02 LAB
ANION GAP SERPL CALCULATED.3IONS-SCNC: 5 MMOL/L (ref 4–13)
BASOPHILS # BLD AUTO: 0.03 THOUSANDS/ΜL (ref 0–0.1)
BASOPHILS NFR BLD AUTO: 0 % (ref 0–1)
BUN SERPL-MCNC: 25 MG/DL (ref 5–25)
CALCIUM SERPL-MCNC: 7.6 MG/DL (ref 8.3–10.1)
CHLORIDE SERPL-SCNC: 110 MMOL/L (ref 100–108)
CO2 SERPL-SCNC: 29 MMOL/L (ref 21–32)
CREAT SERPL-MCNC: 1.17 MG/DL (ref 0.6–1.3)
EOSINOPHIL # BLD AUTO: 0.19 THOUSAND/ΜL (ref 0–0.61)
EOSINOPHIL NFR BLD AUTO: 3 % (ref 0–6)
ERYTHROCYTE [DISTWIDTH] IN BLOOD BY AUTOMATED COUNT: 15.3 % (ref 11.6–15.1)
GFR SERPL CREATININE-BSD FRML MDRD: 42 ML/MIN/1.73SQ M
GLUCOSE SERPL-MCNC: 99 MG/DL (ref 65–140)
HCT VFR BLD AUTO: 42.5 % (ref 34.8–46.1)
HGB BLD-MCNC: 13.4 G/DL (ref 11.5–15.4)
IMM GRANULOCYTES # BLD AUTO: 0.03 THOUSAND/UL (ref 0–0.2)
IMM GRANULOCYTES NFR BLD AUTO: 0 % (ref 0–2)
LYMPHOCYTES # BLD AUTO: 1.42 THOUSANDS/ΜL (ref 0.6–4.47)
LYMPHOCYTES NFR BLD AUTO: 20 % (ref 14–44)
MCH RBC QN AUTO: 31.5 PG (ref 26.8–34.3)
MCHC RBC AUTO-ENTMCNC: 31.5 G/DL (ref 31.4–37.4)
MCV RBC AUTO: 100 FL (ref 82–98)
MONOCYTES # BLD AUTO: 0.66 THOUSAND/ΜL (ref 0.17–1.22)
MONOCYTES NFR BLD AUTO: 9 % (ref 4–12)
NEUTROPHILS # BLD AUTO: 4.68 THOUSANDS/ΜL (ref 1.85–7.62)
NEUTS SEG NFR BLD AUTO: 68 % (ref 43–75)
NRBC BLD AUTO-RTO: 0 /100 WBCS
PLATELET # BLD AUTO: 248 THOUSANDS/UL (ref 149–390)
PMV BLD AUTO: 9.8 FL (ref 8.9–12.7)
POTASSIUM SERPL-SCNC: 4.6 MMOL/L (ref 3.5–5.3)
RBC # BLD AUTO: 4.26 MILLION/UL (ref 3.81–5.12)
SODIUM SERPL-SCNC: 144 MMOL/L (ref 136–145)
WBC # BLD AUTO: 7.01 THOUSAND/UL (ref 4.31–10.16)

## 2018-06-02 PROCEDURE — 87118 MYCOBACTERIC IDENTIFICATION: CPT | Performed by: INTERNAL MEDICINE

## 2018-06-02 PROCEDURE — 99232 SBSQ HOSP IP/OBS MODERATE 35: CPT | Performed by: INTERNAL MEDICINE

## 2018-06-02 PROCEDURE — 87206 SMEAR FLUORESCENT/ACID STAI: CPT | Performed by: INTERNAL MEDICINE

## 2018-06-02 PROCEDURE — 80048 BASIC METABOLIC PNL TOTAL CA: CPT | Performed by: INTERNAL MEDICINE

## 2018-06-02 PROCEDURE — 87116 MYCOBACTERIA CULTURE: CPT | Performed by: INTERNAL MEDICINE

## 2018-06-02 PROCEDURE — 71260 CT THORAX DX C+: CPT

## 2018-06-02 PROCEDURE — 85025 COMPLETE CBC W/AUTO DIFF WBC: CPT | Performed by: INTERNAL MEDICINE

## 2018-06-02 PROCEDURE — 84145 PROCALCITONIN (PCT): CPT | Performed by: INTERNAL MEDICINE

## 2018-06-02 RX ADMIN — IODIXANOL 85 ML: 320 INJECTION, SOLUTION INTRAVASCULAR at 12:50

## 2018-06-02 RX ADMIN — SODIUM CHLORIDE 75 ML/HR: 0.9 INJECTION, SOLUTION INTRAVENOUS at 10:19

## 2018-06-02 RX ADMIN — AMLODIPINE BESYLATE 5 MG: 5 TABLET ORAL at 10:11

## 2018-06-02 RX ADMIN — NICOTINE 1 PATCH: 14 PATCH, EXTENDED RELEASE TRANSDERMAL at 10:11

## 2018-06-02 NOTE — PHYSICIAN ADVISOR
Current patient class: Inpatient  The patient is currently on Hospital Day: 2     The patient is  documented to require at least a 2nd midnight in the hospital  As such the patient is  expected to satisfy the 2 midnight benchmark and is therefore eligible for inpatient admission  After review of the relevant documentation, labs, vital signs and test results, the patient is appropriate for INPATIENT ADMISSION  Admission to the hospital as an inpatient is a complex decision making process which requires the practitioner to consider the patients presenting complaint, history and physical examination and all relevant testing  With this in mind, in this case, the patient was deemed appropriate for INPATIENT ADMISSION  After review of the documentation and testing available at the time of the admission I concur with this clinical determination of medical necessity  Rationale is as follows: The patient is a 80 yrs Female who presented to the ED at 6/1/2018  1:09 PM with a chief complaint of Cough (pt coughing up blood on tuesday and told to come here for r/o TB, hx of TB 3 yrs ago)   Patient was admitted for hemoptysis -has ongoing workup-AFB smear to be collected every 8 hr x3  He is on negative isolation  Pulmonary and ideology service has been consulted for further evaluation and their evaluation is pending            The patients vitals on arrival were ED Triage Vitals   Temperature Pulse Respirations Blood Pressure SpO2   06/01/18 1310 06/01/18 1310 06/01/18 1310 06/01/18 1310 06/01/18 1310   98 7 °F (37 1 °C) 83 18 163/69 98 %      Temp Source Heart Rate Source Patient Position - Orthostatic VS BP Location FiO2 (%)   06/01/18 1700 -- 06/01/18 1700 06/01/18 1700 --   Oral  Sitting Left arm       Pain Score       06/01/18 1310       No Pain           Past Medical History:   Diagnosis Date    Dementia     Hypertension     Ovarian cyst      Past Surgical History:   Procedure Laterality Date    APPENDECTOMY      COLONOSCOPY      COLONOSCOPY N/A 10/5/2017    Procedure: COLONOSCOPY;  Surgeon: Cliff Arriaga MD;  Location: [facility];   Service: Gastroenterology    OVARIAN CYST REMOVAL         The patient was admitted to the hospital at 21  on 6/1/18 for the following diagnosis:  Cough [R05]  History of TB (tuberculosis) [Z86 11]  Hemoptysis [R04 2]    Consults have been placed to:   IP CONSULT TO INFECTIOUS DISEASES  IP CONSULT TO PULMONOLOGY    Vitals:    06/01/18 1906 06/01/18 1907 06/01/18 2300 06/02/18 0806   BP:   116/62 148/65   BP Location:   Left arm Left arm   Pulse:   72 72   Resp:   16 18   Temp:   98 5 °F (36 9 °C) 98 °F (36 7 °C)   TempSrc:   Oral Oral   SpO2: 96% 96% 93% 94%   Weight:       Height:           Most recent labs:    Recent Labs      06/01/18   1351  06/02/18   0445   WBC  6 78  7 01   HGB  14 0  13 4   HCT  43 3  42 5   PLT  254  248   K  5 4*  4 6   NA  140  144   CALCIUM  8 3  7 6*   BUN  18  25   CREATININE  1 08  1 17   LIPASE  123   --    INR  1 03   --    TROPONINI  <0 02   --    AST  24   --    ALT  12   --    ALKPHOS  93   --    BILITOT  0 50   --        Scheduled Meds:  Current Facility-Administered Medications:  acetaminophen 650 mg Oral Q6H PRN Kamron Kamara MD    amLODIPine 5 mg Oral Daily Kamron Kamara MD    nicotine 1 patch Transdermal Daily Kamron Kamara MD    ondansetron 4 mg Intravenous Q6H PRN Kamron Kamara MD    sodium chloride (PF) 3 mL Intravenous PRN Monse Lacey DO    sodium chloride 75 mL/hr Intravenous Continuous Isauro Araujo PA-C Last Rate: 75 mL/hr (06/02/18 1019)     Continuous Infusions:  sodium chloride 75 mL/hr Last Rate: 75 mL/hr (06/02/18 1019)     PRN Meds:   acetaminophen    ondansetron    sodium chloride (PF)    Surgical procedures (if appropriate):

## 2018-06-02 NOTE — ASSESSMENT & PLAN NOTE
Malnutrition Findings:           BMI Findings: Body mass index is 15 66 kg/m²  Nutrition consult  Unclear source - may be decreased oral intake but patient reports great appetite

## 2018-06-02 NOTE — ASSESSMENT & PLAN NOTE
Patient does have prior history of tuberculosis and was treated 4 years ago  Patient is current active smoker  My differentials-recurrent TB versus pneumonia versus bronchitis versus malignancy    ED physician discussed with Infectious Diseases who recommended to collect AFB smear x3 every 8 hr and continue supportive care at this time  No antibioitics recommended on admission per my review of notes  CT chest, rule out any mass or malignancy:pending  Consult pulmonology and ID for further evaluation of hemoptysis    Will check procalcitonin  If positive will speak with oncall ID about abx

## 2018-06-02 NOTE — ASSESSMENT & PLAN NOTE
Patient's daughter reports that patient is not taking her blood pressure medications regularly  Continue home dose of amlodipine and continue to monitor blood pressure  BP ok on norvasc    continue

## 2018-06-02 NOTE — CASE MANAGEMENT
Initial Clinical Review    Admission: Date/Time/Statement: 6/1/18 @ 1513     Orders Placed This Encounter   Procedures    Inpatient Admission (expected length of stay for this patient is greater than two midnights)     Standing Status:   Standing     Number of Occurrences:   1     Order Specific Question:   Admitting Physician     Answer:   Chencho Silverio     Order Specific Question:   Level of Care     Answer:   Med Surg [16]     Order Specific Question:   Estimated length of stay     Answer:   More than 2 Midnights     Order Specific Question:   Certification     Answer:   I certify that inpatient services are medically necessary for this patient for a duration of greater than two midnights  See H&P and MD Progress Notes for additional information about the patient's course of treatment  ED: Date/Time/Mode of Arrival:   ED Arrival Information     Expected Arrival Acuity Means of Arrival Escorted By Service Admission Type    - 6/1/2018 13:09 Urgent Ambulance [address]  [facility] Urgent    Arrival Complaint    -          Chief Complaint:   Chief Complaint   Patient presents with    Cough     pt coughing up blood on tuesday and told to come here for r/o TB, hx of TB 3 yrs ago       History of Illness: Rachel Vega is a 80 y o  female with past medical history of extensive smoking, history of tuberculosis status post treatment, dementia who presents for evaluation of hemoptysis  According to patient's daughter the patient does have a history of tuberculosis and was treated for this approximately 40 years ago at [facility]   Over past several days patient was noted to have mild coughing episode followed by some blood in her sputum  No reported fevers or chills  Patient's daughter reported some exertional shortness of breath worsening over last few days  Currently patient is in reverse isolation for tuberculosis, denies any complaints at this time    Denies any chest pain, cough, shortness of breath  X-ray of the chest showed fine reticular nodular interstitial lung disease, finding of undetermined chronicity  ED physician discussed with Infectious Diseases who recommended to admit the patient, collected AFB smears x3, and continue supportive management        ED Vital Signs:   ED Triage Vitals   Temperature Pulse Respirations Blood Pressure SpO2   06/01/18 1310 06/01/18 1310 06/01/18 1310 06/01/18 1310 06/01/18 1310   98 7 °F (37 1 °C) 83 18 163/69 98 %      Temp Source Heart Rate Source Patient Position - Orthostatic VS BP Location FiO2 (%)   06/01/18 1700 -- 06/01/18 1700 06/01/18 1700 --   Oral  Sitting Left arm       Pain Score       06/01/18 1310       No Pain        Wt Readings from Last 1 Encounters:   06/01/18 38 8 kg (85 lb 9 6 oz)       Vital Signs (abnormal): wnl    Abnormal Labs/Diagnostic Test Results: K   5 4, alb 2 8  PCXR -   Heart shadow appears unremarkable   Atherosclerotic vascular calcifications are noted  Fine reticular nodular opacities in the upper lung fields are age indeterminant  Osseous structures appear within normal limits for patient age    ED Treatment:   Medication Administration from 06/01/2018 1309 to 06/01/2018 1703     None          Past Medical/Surgical History: Active Ambulatory Problems     Diagnosis Date Noted    Diarrhea 10/02/2017    Tobacco dependence 10/02/2017     Resolved Ambulatory Problems     Diagnosis Date Noted    No Resolved Ambulatory Problems     Past Medical History:   Diagnosis Date    Dementia     Hypertension     Ovarian cyst        Admitting Diagnosis: Cough [R05]  History of TB (tuberculosis) [Z86 11]  Hemoptysis [R04 2]    Age/Sex: 80 y o  female    Assessment/Plan:   Hemoptysis   Assessment & Plan     Patient does have prior history of tuberculosis and was treated 4 years ago  Patient is current active smoker    My differentials-recurrent TB versus pneumonia versus bronchitis versus malignancy     ED physician discussed with Infectious Diseases who recommended to collect AFB smear x3 every 8 hr and continue supportive care at this time  Will order CT chest, rule out any mass or malignancy  Consult pulmonology and ID for further evaluation of hemoptysis             Tobacco dependence   Assessment & Plan     Encourage cessation  Nicotine patch          Dementia   Assessment & Plan     Currently patient is alert, oriented x3      Encourage re- orientation, delirium precautions          Hypertension   Assessment & Plan     Patient's daughter reports that patient is not taking her blood pressure medications regularly  Continue home dose of amlodipine and continue to monitor blood pressure          History of TB (tuberculosis)   Assessment & Plan     As per patient's daughter patient was treated for TB 40 years ago at [facility] in [place]  Records requested and pending  Check AFB smear x3  Continue negative isolation until cultures come back negative     Id consult pending           VTE Prophylaxis: Held secondary to hemoptysis  / sequential compression device   Code Status:  Full code  POLST: There is no POLST form on file for this patient (pre-hospital)  Discussion with family:  Discussed with patient's daughter over the phone   Anticipated Length of Stay:  Patient will be admitted on an Inpatient basis with an anticipated length of stay of greater 2 midnights     Justification for Hospital Stay:  Evaluation of hemoptysis      Admission Orders:  Scheduled Meds:   Current Facility-Administered Medications:  acetaminophen 650 mg Oral Q6H PRN Kamron Kamara MD    amLODIPine 5 mg Oral Daily Landon Chandra MD    nicotine 1 patch Transdermal Daily Landon Chandra MD    ondansetron 4 mg Intravenous Q6H PRN Landon Chandra MD    sodium chloride (PF) 3 mL Intravenous PRN Sania Lagunas DO    sodium chloride 75 mL/hr Intravenous Continuous Jayson Jorge PA-C Last Rate: 75 mL/hr (06/02/18 1019)     Continuous Infusions:   sodium chloride 75 mL/hr Last Rate: 75 mL/hr (06/02/18 1019)     PRN Meds:   acetaminophen    ondansetron    sodium chloride (PF)    Reg diet   Inc spirom   SCD  Up and OOB as angelique   pulm consult   Tele   Consult ID   6/2  AFB cx w/ stain , cbc , bmp  Cl  110, ced   7 6

## 2018-06-02 NOTE — ASSESSMENT & PLAN NOTE
As per patient's daughter patient was treated for TB 40 years ago at [facility] in [place]    Records requested and pending  Check AFB smear x3  Continue negative isolation until cultures come back negative    Id consult pending

## 2018-06-02 NOTE — PROGRESS NOTES
Progress Note Jonh Schmitz [birth year], 80 y o  female MRN: [id number]    Unit/Bed#: -01 Encounter: [id number]    Primary Care Provider: Sushma Tomas MD   Date and time admitted to hospital: 6/1/2018  1:09 PM        * Hemoptysis   Assessment & Plan    Patient does have prior history of tuberculosis and was treated 4 years ago  Patient is current active smoker  My differentials-recurrent TB versus pneumonia versus bronchitis versus malignancy    ED physician discussed with Infectious Diseases who recommended to collect AFB smear x3 every 8 hr and continue supportive care at this time  No antibioitics recommended on admission per my review of notes  CT chest, rule out any mass or malignancy:pending due to request for hydration before and after study  Consult pulmonology and ID for further evaluation of hemoptysis    Will check procalcitonin  If positive will speak with oncall ID about abx  Viewed sputum this am greenish yellow  No fever or distress  History of TB (tuberculosis)   Assessment & Plan    As per patient's daughter patient was treated for TB 40 years ago at [facility] in [address] [facility]  Records requested and pending  Check AFB smear x3  Continue negative isolation until cultures come back negative    Id consult pending        Dementia   Assessment & Plan    Currently patient is alert, oriented x3  Encourage re- orientation, delirium precautions        Hypertension   Assessment & Plan    Patient's daughter reports that patient is not taking her blood pressure medications regularly  Continue home dose of amlodipine and continue to monitor blood pressure  BP ok on norvasc   continue        Tobacco dependence   Assessment & Plan    Encourage cessation  Nicotine patch in place               VTE Pharmacologic Prophylaxis:   Pharmacologic: Pharmacologic VTE Prophylaxis contraindicated due to hemoptysis, sputum now yellow green  Mechanical: Mechanical VTE prophylaxis in place  Patient Centered Rounds: spoke with nurse via phone  Discussions with Specialists or Other Care Team Provider: None  Education and Discussions with Family / Patient: daughter not present will call    Time Spent for Care: 30 minutes  Current Length of Stay: 1 day(s)  Current Patient Status: Inpatient   Certification Statement: The patient will continue to require additional inpatient hospital stay due to rule out TB, pneumonia vs malignancy    Discharge Plan: to be determined  Patient was living alone but then moved in with daughter after last hosptial stay   Review of PCP notes indicate not safe to stay alone  Code Status: Level 1 - Full Code    Subjective:   Patient states feeling fine  States coughed up some greenish sputum but no blood  States appetite is huge but looks cachectic  Objective:   Vitals:   Temp (24hrs), Av 3 °F (36 8 °C), Min:98 °F (36 7 °C), Max:98 7 °F (37 1 °C)    HR:  [62-83] 72  Resp:  [16-18] 18  BP: (116-163)/(60-69) 148/65  SpO2:  [93 %-98 %] 94 %  Body mass index is 15 66 kg/m²  Input and Output Summary (last 24 hours): Intake/Output Summary (Last 24 hours) at 18 1157  Last data filed at 18 1017   Gross per 24 hour   Intake             1160 ml   Output              250 ml   Net              910 ml       Physical Exam:     Physical Exam   Constitutional: She is oriented to person, place, and time  She appears well-developed  No distress  Cachectic female in no acute distress   HENT:   Head: Normocephalic and atraumatic  Right Ear: External ear normal    Left Ear: External ear normal    Nose: Nose normal    Mouth/Throat: Oropharynx is clear and moist  No oropharyngeal exudate  Eyes: Conjunctivae and EOM are normal  Pupils are equal, round, and reactive to light  Right eye exhibits no discharge  Left eye exhibits no discharge  No scleral icterus  Neck: Normal range of motion  Neck supple  No JVD present  No thyromegaly present  Cardiovascular: Normal rate, regular rhythm, normal heart sounds and intact distal pulses  Exam reveals no gallop and no friction rub  No murmur heard  Pulmonary/Chest: No respiratory distress  She has no wheezes  She has no rales  Poor air entry with no rhonchi , rales or wheezing  Abdominal: Soft  Bowel sounds are normal  She exhibits no distension  There is no tenderness  There is no rebound and no guarding  Musculoskeletal: Normal range of motion  She exhibits no edema or deformity  Lymphadenopathy:     She has no cervical adenopathy  Neurological: She is alert and oriented to person, place, and time  She has normal reflexes  No cranial nerve deficit  She exhibits normal muscle tone  Skin: Skin is warm and dry  No rash noted  She is not diaphoretic  No erythema  Psychiatric: She has a normal mood and affect  Pleasant and hides dementia well  Vitals reviewed  Additional Data:   Labs:    Results from last 7 days  Lab Units 06/02/18  0445   WBC Thousand/uL 7 01   HEMOGLOBIN g/dL 13 4   HEMATOCRIT % 42 5   PLATELETS Thousands/uL 248   NEUTROS PCT % 68   LYMPHS PCT % 20   MONOS PCT % 9   EOS PCT % 3       Results from last 7 days  Lab Units 06/02/18  0445 06/01/18  1351   SODIUM mmol/L 144 140   POTASSIUM mmol/L 4 6 5 4*   CHLORIDE mmol/L 110* 104   CO2 mmol/L 29 28   BUN mg/dL 25 18   CREATININE mg/dL 1 17 1 08   CALCIUM mg/dL 7 6* 8 3   TOTAL PROTEIN g/dL  --  7 1   BILIRUBIN TOTAL mg/dL  --  0 50   ALK PHOS U/L  --  93   ALT U/L  --  12   AST U/L  --  24   GLUCOSE RANDOM mg/dL 99 93       Results from last 7 days  Lab Units 06/01/18  1351   INR  1 03       * I Have Reviewed All Lab Data Listed Above  * Additional Pertinent Lab Tests Reviewed:  All Labs Within Last 24 Hours Reviewed    Imaging:    Imaging Reports Reviewed Today Include: none      Cultures:   Blood Culture: No results found for: BLOODCX  Urine Culture: No results found for: URINECX  Sputum Culture: No components found for: SPUTUMCX  Wound Culture: No results found for: WOUNDCULT    Last 24 Hours Medication List:     Current Facility-Administered Medications:  acetaminophen 650 mg Oral Q6H PRN Kamron Kamara MD    amLODIPine 5 mg Oral Daily Denise Maya MD    nicotine 1 patch Transdermal Daily Denise Maya MD    ondansetron 4 mg Intravenous Q6H PRN Denise Maya MD    sodium chloride (PF) 3 mL Intravenous PRN Caridad Escalante DO    sodium chloride 75 mL/hr Intravenous Continuous Griffin Hopson PA-C Last Rate: 75 mL/hr (06/02/18 1019)        Today, Patient Was Seen By: Rebeka Hamilton MD    ** Please Note: Dragon 360 Dictation voice to text software may have been used in the creation of this document   **

## 2018-06-03 LAB — PROCALCITONIN SERPL-MCNC: <0.05 NG/ML

## 2018-06-03 PROCEDURE — 99232 SBSQ HOSP IP/OBS MODERATE 35: CPT | Performed by: INTERNAL MEDICINE

## 2018-06-03 RX ADMIN — AMLODIPINE BESYLATE 5 MG: 5 TABLET ORAL at 08:12

## 2018-06-03 RX ADMIN — SODIUM CHLORIDE 75 ML/HR: 0.9 INJECTION, SOLUTION INTRAVENOUS at 18:05

## 2018-06-03 RX ADMIN — SODIUM CHLORIDE 75 ML/HR: 0.9 INJECTION, SOLUTION INTRAVENOUS at 04:32

## 2018-06-03 RX ADMIN — NICOTINE 1 PATCH: 14 PATCH, EXTENDED RELEASE TRANSDERMAL at 08:14

## 2018-06-03 NOTE — PLAN OF CARE
Problem: RESPIRATORY - ADULT  Goal: Achieves optimal ventilation and oxygenation  INTERVENTIONS:  - Assess for changes in respiratory status  - Assess for changes in mentation and behavior  - Position to facilitate oxygenation and minimize respiratory effort  - Oxygen administration by appropriate delivery method based on oxygen saturation (per order) or ABGs  - Initiate smoking cessation education as indicated  - Encourage broncho-pulmonary hygiene including cough, deep breathe, Incentive Spirometry  - Assess the need for suctioning and aspirate as needed  - Assess and instruct to report SOB or any respiratory difficulty  - Respiratory Therapy support as indicated   Outcome: Progressing      Problem: INFECTION - ADULT  Goal: Absence or prevention of progression during hospitalization  INTERVENTIONS:  - Assess and monitor for signs and symptoms of infection  - Monitor lab/diagnostic results  - Monitor all insertion sites, i e  indwelling lines, tubes, and drains  - Monitor endotracheal (as able) and nasal secretions for changes in amount and color  - [place] appropriate cooling/warming therapies per order  - Administer medications as ordered  - Instruct and encourage patient and family to use good hand hygiene technique  - Identify and instruct in appropriate isolation precautions for identified infection/condition   Outcome: Progressing      Problem: SAFETY ADULT  Goal: Patient will remain free of falls  INTERVENTIONS:  - Assess patient frequently for physical needs  -  Identify cognitive and physical deficits and behaviors that affect risk of falls    -  [place] fall precautions as indicated by assessment   - Educate patient/family on patient safety including physical limitations  - Instruct patient to call for assistance with activity based on assessment  - Modify environment to reduce risk of injury  - Consider OT/PT consult to assist with strengthening/mobility   Outcome: Progressing      Problem: DISCHARGE PLANNING  Goal: Discharge to home or other facility with appropriate resources  INTERVENTIONS:  - Identify barriers to discharge w/patient and caregiver  - Arrange for needed discharge resources and transportation as appropriate  - Identify discharge learning needs (meds, wound care, etc )  - Arrange for interpretive services to assist at discharge as needed  - Refer to Case Management Department for coordinating discharge planning if the patient needs post-hospital services based on physician/advanced practitioner order or complex needs related to functional status, cognitive ability, or social support system   Outcome: Progressing      Problem: Knowledge Deficit  Goal: Patient/family/caregiver demonstrates understanding of disease process, treatment plan, medications, and discharge instructions  Complete learning assessment and assess knowledge base  Interventions:  - Provide teaching at level of understanding  - Provide teaching via preferred learning methods   Outcome: Progressing      Problem: Nutrition/Hydration-ADULT  Goal: Nutrient/Hydration intake appropriate for improving, restoring or maintaining nutritional needs  Monitor and assess patient's nutrition/hydration status for malnutrition (ex- brittle hair, bruises, dry skin, pale skin and conjunctiva, muscle wasting, smooth red tongue, and disorientation)  Collaborate with interdisciplinary team and initiate plan and interventions as ordered  Monitor patient's weight and dietary intake as ordered or per policy  Utilize nutrition screening tool and intervene per policy  Determine patient's food preferences and provide high-protein, high-caloric foods as appropriate       INTERVENTIONS:  - Monitor oral intake, urinary output, labs, and treatment plans  - Assess nutrition and hydration status and recommend course of action  - Evaluate amount of meals eaten  - Assist patient with eating if necessary   - Allow adequate time for meals  - Recommend/ encourage appropriate diets, oral nutritional supplements, and vitamin/mineral supplements  - Order, calculate, and assess calorie counts as needed  - Assess need for intravenous fluids  - Provide specific nutrition/hydration education as appropriate  - Include patient/family/caregiver in decisions related to nutrition   Outcome: Progressing      Problem: Prexisting or High Potential for Compromised Skin Integrity  Goal: Skin integrity is maintained or improved  INTERVENTIONS:  - Identify patients at risk for skin breakdown  - Assess and monitor skin integrity  - Assess and monitor nutrition and hydration status  - Monitor labs (i e  albumin)  - Assess for incontinence   - Turn and reposition patient  - Assist with mobility/ambulation  - Relieve pressure over bony prominences  - Avoid friction and shearing  - Provide appropriate hygiene as needed including keeping skin clean and dry  - Evaluate need for skin moisturizer/barrier cream  - Collaborate with interdisciplinary team (i e  Nutrition, Rehabilitation, etc )   - Patient/family teaching   Outcome: Progressing

## 2018-06-03 NOTE — PROGRESS NOTES
Progress Note Benjie Osman [birth year], 80 y o  female MRN: [id number]    Unit/Bed#: -01 Encounter: [id number]    Primary Care Provider: Olivia Adler MD   Date and time admitted to hospital: 6/1/2018  1:09 PM        * Hemoptysis   Assessment & Plan    Patient does have prior history of tuberculosis and was treated 4 years ago  Patient is current active smoker  My differentials-recurrent TB versus pneumonia versus bronchitis versus malignancy    ED physician discussed with Infectious Diseases who recommended to collect AFB smear x3 every 8 hr and continue supportive care at this time  No antibioitics recommended on admission per my review of notes  CT chest, rule out any mass or malignancy:pending  Consult pulmonology and ID for further evaluation of hemoptysis    Will check procalcitonin  If positive will speak with oncall ID about abx  Procalcitonin within normal limits no antibiotics added  Patient has not had any further hemoptysis  Patient will need repeat CT scan and pulmonary consult in a m [facility] Await cultures for T B  Will attempt to obtain information from hospital in [facility] in a [facility]  History of TB (tuberculosis)   Assessment & Plan    As per patient's daughter patient was treated for TB 40 years ago at [facility] in [place]  Records requested and pending  Check AFB smear x3  Continue negative isolation until cultures come back negative    Id consult pending  Will attempt to obtain records from [address] [place] [address],[address]    Currently patient is alert, oriented x3  Encourage re- orientation, delirium precautions        Hypertension   Assessment & Plan    Patient's daughter reports that patient is not taking her blood pressure medications regularly  Continue home dose of amlodipine and continue to monitor blood pressure  BP ok on norvasc    continue        Tobacco dependence   Assessment & Plan    Encourage cessation  Nicotine patch in place  Malnutrition ([facility] [address] )   Assessment & Plan    Malnutrition Findings:           BMI Findings:  BMI Classifications: Underweight < 18 5 (very low body wt but weight gain noted last 7 months  Does appear cachectic but in UBW range )     Body mass index is 15 66 kg/m²  Nutrition consult  Unclear source - may be decreased oral intake but patient reports great appetite  Hyperkalemia   Assessment & Plan    Mild on admission  Resolved with hydration  VTE Pharmacologic Prophylaxis:   Pharmacologic: Pharmacologic VTE Prophylaxis contraindicated due to Hemoptysis  Mechanical: Mechanical VTE prophylaxis in place  Patient Centered Rounds: I have performed bedside rounds with nursing staff today  Discussions with Specialists or Other Care Team Provider:   None  Education and Discussions with Family / Patient:   Spoke with family yesterdaywith Family / Patient:  Time Spent for Care: 30 minutes  If More than 50% of total time spent on counseling and coordination of care as described above indicate yes or no and described the counseling and coordination:  No    Current Length of Stay: 2 day(s)  Current Patient Status: Inpatient   Certification Statement: The patient will continue to require additional inpatient hospital stay due to Awaiting sputum cultures and information    Discharge Plan: To determine  Code Status: Level 1 - Full Code    Subjective:   Patient states feeling fine appetite good sleeping well  Moved her bowels this morning without difficulty  Reports no new issues  States that she is no longer coughing up any sputum  Objective:   Vitals:   Temp (24hrs), Av 8 °F (36 6 °C), Min:97 6 °F (36 4 °C), Max:98 °F (36 7 °C)    HR:  [64-78] 67  Resp:  [17-18] 18  BP: (142-148)/(54-66) 148/61  SpO2:  [97 %] 97 %  Body mass index is 15 66 kg/m²  Input and Output Summary (last 24 hours):        Intake/Output Summary (Last 24 hours) at 18 1648  Last data filed at 06/03/18 1500   Gross per 24 hour   Intake             1260 ml   Output             1325 ml   Net              -65 ml       Physical Exam:     Physical Exam   Constitutional: She is oriented to person, place, and time  She appears well-developed  No distress  Cachectic elderly disheveled female in no acute distress sitting in the chair   HENT:   Head: Normocephalic and atraumatic  Right Ear: External ear normal    Left Ear: External ear normal    Nose: Nose normal    Mouth/Throat: Oropharynx is clear and moist  No oropharyngeal exudate  Eyes: Conjunctivae and EOM are normal  Pupils are equal, round, and reactive to light  Right eye exhibits no discharge  Left eye exhibits no discharge  No scleral icterus  Neck: Normal range of motion  Neck supple  No JVD present  No thyromegaly present  Cardiovascular: Normal rate, regular rhythm, normal heart sounds and intact distal pulses  Exam reveals no gallop and no friction rub  No murmur heard  Pulmonary/Chest: No respiratory distress  She has no wheezes  She has no rales  Decreased breath sounds   Abdominal: Soft  Bowel sounds are normal  She exhibits no distension  There is no tenderness  There is no rebound and no guarding  Musculoskeletal: Normal range of motion  She exhibits no edema or deformity  Lymphadenopathy:     She has no cervical adenopathy  Neurological: She is alert and oriented to person, place, and time  She has normal reflexes  No cranial nerve deficit  She exhibits normal muscle tone  Skin: Skin is warm and dry  No rash noted  She is not diaphoretic  No erythema  Has a healing skin tear on her left shin   Psychiatric: She has a normal mood and affect  Vitals reviewed        Additional Data:   Labs:    Results from last 7 days  Lab Units 06/02/18  0445   WBC Thousand/uL 7 01   HEMOGLOBIN g/dL 13 4   HEMATOCRIT % 42 5   PLATELETS Thousands/uL 248   NEUTROS PCT % 68   LYMPHS PCT % 20   MONOS PCT % 9   EOS PCT % 3       Results from last 7 days  Lab Units 06/02/18  0445 06/01/18  1351   SODIUM mmol/L 144 140   POTASSIUM mmol/L 4 6 5 4*   CHLORIDE mmol/L 110* 104   CO2 mmol/L 29 28   BUN mg/dL 25 18   CREATININE mg/dL 1 17 1 08   CALCIUM mg/dL 7 6* 8 3   TOTAL PROTEIN g/dL  --  7 1   BILIRUBIN TOTAL mg/dL  --  0 50   ALK PHOS U/L  --  93   ALT U/L  --  12   AST U/L  --  24   GLUCOSE RANDOM mg/dL 99 93       Results from last 7 days  Lab Units 06/01/18  1351   INR  1 03       * I Have Reviewed All Lab Data Listed Above  * Additional Pertinent Lab Tests Reviewed: No New Labs Available For Today    Imaging:    Imaging Reports Reviewed Today Include:  None    Cultures:   Blood Culture:   Lab Results   Component Value Date    BLOODCX No Growth at 24 hrs  06/01/2018    BLOODCX No Growth at 24 hrs  06/01/2018     Urine Culture: No results found for: URINECX  Sputum Culture: No components found for: SPUTUMCX  Wound Culture: No results found for: WOUNDCULT    Last 24 Hours Medication List:     Current Facility-Administered Medications:  acetaminophen 650 mg Oral Q6H PRN Maria M Reynaga MD    amLODIPine 5 mg Oral Daily Maria M Reynaga MD    nicotine 1 patch Transdermal Daily Maria M Reynaga MD    ondansetron 4 mg Intravenous Q6H PRN Maria M Reynaga MD    sodium chloride (PF) 3 mL Intravenous PRN Leopoldo Hymen, DO    sodium chloride 75 mL/hr Intravenous Continuous Keron Schmitz PA-C Last Rate: 75 mL/hr (06/03/18 0432)        Today, Patient Was Seen By: Ruslan Davis MD    ** Please Note: Dragon 360 Dictation voice to text software may have been used in the creation of this document   **

## 2018-06-03 NOTE — ASSESSMENT & PLAN NOTE
Malnutrition Findings:           BMI Findings:  BMI Classifications: Underweight < 18 5 (very low body wt but weight gain noted last 7 months  Does appear cachectic but in UBW range )     Body mass index is 15 66 kg/m²  Nutrition consult  Unclear source - may be decreased oral intake but patient reports great appetite

## 2018-06-03 NOTE — ASSESSMENT & PLAN NOTE
Patient does have prior history of tuberculosis and was treated 4 years ago  Patient is current active smoker  My differentials-recurrent TB versus pneumonia versus bronchitis versus malignancy    ED physician discussed with Infectious Diseases who recommended to collect AFB smear x3 every 8 hr and continue supportive care at this time  No antibioitics recommended on admission per my review of notes  CT chest, rule out any mass or malignancy:pending  Consult pulmonology and ID for further evaluation of hemoptysis    Will check procalcitonin  If positive will speak with oncall ID about abx  Procalcitonin within normal limits no antibiotics added  Patient has not had any further hemoptysis  Patient will need repeat CT scan and pulmonary consult in a [facility] Await cultures for T B  Will attempt to obtain information from hospital in [facility] in a m  versus Health Department

## 2018-06-03 NOTE — ASSESSMENT & PLAN NOTE
As per patient's daughter patient was treated for TB 40 years ago at [facility] in [place]  Records requested and pending  Check AFB smear x3  Continue negative isolation until cultures come back negative    Id consult pending    Will attempt to obtain records from [facility]

## 2018-06-04 PROCEDURE — 99222 1ST HOSP IP/OBS MODERATE 55: CPT | Performed by: INTERNAL MEDICINE

## 2018-06-04 PROCEDURE — 99232 SBSQ HOSP IP/OBS MODERATE 35: CPT | Performed by: INTERNAL MEDICINE

## 2018-06-04 PROCEDURE — 99221 1ST HOSP IP/OBS SF/LOW 40: CPT | Performed by: INTERNAL MEDICINE

## 2018-06-04 PROCEDURE — 87206 SMEAR FLUORESCENT/ACID STAI: CPT | Performed by: INTERNAL MEDICINE

## 2018-06-04 PROCEDURE — 87116 MYCOBACTERIA CULTURE: CPT | Performed by: INTERNAL MEDICINE

## 2018-06-04 RX ADMIN — SODIUM CHLORIDE 75 ML/HR: 0.9 INJECTION, SOLUTION INTRAVENOUS at 22:01

## 2018-06-04 RX ADMIN — NICOTINE 1 PATCH: 14 PATCH, EXTENDED RELEASE TRANSDERMAL at 08:35

## 2018-06-04 RX ADMIN — AMLODIPINE BESYLATE 5 MG: 5 TABLET ORAL at 08:34

## 2018-06-04 NOTE — PLAN OF CARE
DISCHARGE PLANNING     Discharge to home or other facility with appropriate resources Progressing        INFECTION - ADULT     Absence or prevention of progression during hospitalization Progressing        Knowledge Deficit     Patient/family/caregiver demonstrates understanding of disease process, treatment plan, medications, and discharge instructions Progressing        Nutrition/Hydration-ADULT     Nutrient/Hydration intake appropriate for improving, restoring or maintaining nutritional needs Progressing        Prexisting or High Potential for Compromised Skin Integrity     Skin integrity is maintained or improved Progressing        RESPIRATORY - ADULT     Achieves optimal ventilation and oxygenation Progressing        SAFETY ADULT     Patient will remain free of falls Progressing

## 2018-06-04 NOTE — CONSULTS
Consultation - Pulmonary Medicine   Carry Arabia 80 y o  female MRN: [id number]  Unit/Bed#: -01 Encounter: [id number]      Assessment:  1  Isolated episode of hemoptysis with history of tuberculosis  2  Abnormal CT of chest with chronic bronchiectasis/scarring and 1 cm spiculated left lower lobe nodule  3  Exertional dyspnea  4  Tobacco abuse    Plan:   1  Isolated episode of hemoptysis with history of tuberculosis  -currently saturating 97% on room air  -continue isolation precautions  -afebrile, no leukocytosis, asymptomatic, normal procalcitonin < 05, no acute infiltrate/cavitary lesion; agree with monitoring off antibiotics  - AFB culture (-) x 2/3; likely no active TB infection (plan for Respiratory therapy to try to obtain induced sputum for last AFB culture)  -isolated episode of minimal hemoptysis likely related to diffuse bronchiectasis? Does not appear to be an infectious process or active TB  Will continue to monitor closely  2   Abnormal CT of chest with chronic bronchiectasis/scarring and 1 cm spiculated left lower lobe nodule  -reviewed CT of chest with chronic bronchiectasis/scarring likely related to prior TB infection  -it is unclear whether patient was adequately treated previously? Primary team is attempting to obtain previous records from [facility]  -will need outpatient follow-up for 1 cm spiculated left lower lobe nodule; can discuss with family/patient further about repeat CT of chest in a few months vs PET/CT  3  Exertional dyspnea  -given extensive smoking history likely component of COPD  -will need outpatient PFTs with [facility]  -no acute intervention necessary at this time  4    Tobacco abuse  -strongly encouraged patient to quit smoking  -she continues to smoke 1 pack per day; continue NRT    History of Present Illness   Physician Requesting Consult: Margaret Franks MD  Reason for Consult / Principal Problem:  Hemoptysis  Hx and PE limited by:  None  HPI: Jacki Valerio Live Overton is a 80y o  year old female actively smoking 1 pack a day with an extensive smoking history with significant past medical history of dementia, HTN, tuberculosis who presents for episode of hemoptysis  Patient reports she coughed up blood on Tuesday mixed in with her sputum  She states it was a minimal amount of bright red blood streaked in with her mucus  Patient states she normally coughs up mucus on a daily basis  Denies fever, chills, weight loss, night sweats, chest pain  Does admit to exertional shortness of breath which has been at baseline  Patient has an apparent history of tuberculosis per previous notes  Patient does not remember this and does not remember how long she took antibiotics for  She does remember having an infection about 4-5 years ago  Family states this was not the tuberculosis  Once the patient informed her daughter about episode of hemoptysis, her daughter prompted her to go into the ED  In the ED, 2 of 3 AFB cultures were collected and both were negative for acid-fast bacilli  She has been unable to cough up any more mucus to produce the 3rd sputum sample  CT of chest was completed to assess for any active TB infection  There is no concerning lesion to suggest active TB  Patient has never been on any inhalers  Inpatient consult to Pulmonology  Consult performed by: Yani Adler ordered by: Sabine Hung          Review of Systems   Constitutional: Negative for activity change, appetite change, chills, diaphoresis and fever  HENT: Negative for congestion and postnasal drip  Eyes: Negative for visual disturbance  Respiratory: Positive for cough and shortness of breath  Negative for chest tightness and wheezing  Hemoptysis, sputum production   Cardiovascular: Negative for chest pain and leg swelling  Gastrointestinal: Negative for abdominal pain, diarrhea, nausea and vomiting  Genitourinary: Negative for difficulty urinating  Musculoskeletal: Negative for arthralgias and gait problem  Skin: Negative for rash and wound  Allergic/Immunologic: Negative for immunocompromised state  Neurological: Negative for dizziness, weakness and light-headedness  Hematological: Negative for adenopathy  Psychiatric/Behavioral: Negative for agitation, behavioral problems and confusion  Historical Information   Past Medical History:   Diagnosis Date    Dementia     Hypertension     Ovarian cyst      Past Surgical History:   Procedure Laterality Date    APPENDECTOMY      COLONOSCOPY      COLONOSCOPY N/A 10/5/2017    Procedure: COLONOSCOPY;  Surgeon: Viv Orourke MD;  Location: [facility]; Service: Gastroenterology    OVARIAN CYST REMOVAL       Social History   History   Alcohol Use No     History   Drug Use No     History   Smoking Status    Current Every Day Smoker    Packs/day: 1 00    Types: Cigarettes   Smokeless Tobacco    Never Used     Occupational History:     Family History: non-contributory    Meds/Allergies   all current active meds have been reviewed    Allergies   Allergen Reactions    Penicillins Other (See Comments)     Unknown reaction       Objective   Vitals: Blood pressure 137/62, pulse 69, temperature 98 5 °F (36 9 °C), temperature source Oral, resp  rate 18, height 5' 2" (1 575 m), weight 38 8 kg (85 lb 9 6 oz), SpO2 97 %  ,Body mass index is 15 66 kg/m²  Intake/Output Summary (Last 24 hours) at 06/04/18 1018  Last data filed at 06/04/18 6396   Gross per 24 hour   Intake             2570 ml   Output             2450 ml   Net              120 ml     Invasive Devices     Peripheral Intravenous Line            Peripheral IV 06/02/18 Right Antecubital 1 day                Physical Exam   Constitutional: She is oriented to person, place, and time  She appears well-developed  Under weight/cachectic   HENT:   Head: Normocephalic and atraumatic  Neck: Normal range of motion     Cardiovascular: Normal rate and regular rhythm  No murmur heard  Pulmonary/Chest: Effort normal    Diminished breath sounds bilaterally with no wheezes or rhonchi   Abdominal: Soft  There is no tenderness  Musculoskeletal: Normal range of motion  She exhibits no edema or tenderness  Lymphadenopathy:     She has no cervical adenopathy  Neurological: She is alert and oriented to person, place, and time  Skin: Skin is warm and dry  Psychiatric: She has a normal mood and affect  Her behavior is normal    Nursing note and vitals reviewed  Lab Results: I have personally reviewed pertinent lab results  Imaging Studies: I have personally reviewed pertinent reports  EKG, Pathology, and Other Studies: I have personally reviewed pertinent reports  VTE Prophylaxis: Sequential compression device Harsh Lai)     Code Status: Level 1 - Full Code  Advance Directive and Living Will:      Power of :    POLST:      Counseling/Coordination of Care: Total floor / unit time spent today Thirty-five minutes  Greater than 50% of total time was spent with the patient and / or family counseling and / or coordination of care   A description of the counseling / coordination of care: Discussion with attending physician, discussion of plan of care with slim, coordination with consulting services, discussion of CT of chest, review treatment plan at outpatient follow-up in detail with patient

## 2018-06-04 NOTE — PLAN OF CARE
Problem: RESPIRATORY - ADULT  Goal: Achieves optimal ventilation and oxygenation  INTERVENTIONS:  - Assess for changes in respiratory status  - Assess for changes in mentation and behavior  - Position to facilitate oxygenation and minimize respiratory effort  - Oxygen administration by appropriate delivery method based on oxygen saturation (per order) or ABGs  - Initiate smoking cessation education as indicated  - Encourage broncho-pulmonary hygiene including cough, deep breathe, Incentive Spirometry  - Assess the need for suctioning and aspirate as needed  - Assess and instruct to report SOB or any respiratory difficulty  - Respiratory Therapy support as indicated   Outcome: Progressing      Problem: INFECTION - ADULT  Goal: Absence or prevention of progression during hospitalization  INTERVENTIONS:  - Assess and monitor for signs and symptoms of infection  - Monitor lab/diagnostic results  - Monitor all insertion sites, i e  indwelling lines, tubes, and drains  - Monitor endotracheal (as able) and nasal secretions for changes in amount and color  - [place] appropriate cooling/warming therapies per order  - Administer medications as ordered  - Instruct and encourage patient and family to use good hand hygiene technique  - Identify and instruct in appropriate isolation precautions for identified infection/condition   Outcome: Progressing      Problem: SAFETY ADULT  Goal: Patient will remain free of falls  INTERVENTIONS:  - Assess patient frequently for physical needs  -  Identify cognitive and physical deficits and behaviors that affect risk of falls    -  [place] fall precautions as indicated by assessment   - Educate patient/family on patient safety including physical limitations  - Instruct patient to call for assistance with activity based on assessment  - Modify environment to reduce risk of injury  - Consider OT/PT consult to assist with strengthening/mobility   Outcome: Progressing      Problem: DISCHARGE PLANNING  Goal: Discharge to home or other facility with appropriate resources  INTERVENTIONS:  - Identify barriers to discharge w/patient and caregiver  - Arrange for needed discharge resources and transportation as appropriate  - Identify discharge learning needs (meds, wound care, etc )  - Arrange for interpretive services to assist at discharge as needed  - Refer to Case Management Department for coordinating discharge planning if the patient needs post-hospital services based on physician/advanced practitioner order or complex needs related to functional status, cognitive ability, or social support system   Outcome: Progressing      Problem: Knowledge Deficit  Goal: Patient/family/caregiver demonstrates understanding of disease process, treatment plan, medications, and discharge instructions  Complete learning assessment and assess knowledge base  Interventions:  - Provide teaching at level of understanding  - Provide teaching via preferred learning methods   Outcome: Progressing      Problem: Nutrition/Hydration-ADULT  Goal: Nutrient/Hydration intake appropriate for improving, restoring or maintaining nutritional needs  Monitor and assess patient's nutrition/hydration status for malnutrition (ex- brittle hair, bruises, dry skin, pale skin and conjunctiva, muscle wasting, smooth red tongue, and disorientation)  Collaborate with interdisciplinary team and initiate plan and interventions as ordered  Monitor patient's weight and dietary intake as ordered or per policy  Utilize nutrition screening tool and intervene per policy  Determine patient's food preferences and provide high-protein, high-caloric foods as appropriate       INTERVENTIONS:  - Monitor oral intake, urinary output, labs, and treatment plans  - Assess nutrition and hydration status and recommend course of action  - Evaluate amount of meals eaten  - Assist patient with eating if necessary   - Allow adequate time for meals  - Recommend/ encourage appropriate diets, oral nutritional supplements, and vitamin/mineral supplements  - Order, calculate, and assess calorie counts as needed  - Assess need for intravenous fluids  - Provide specific nutrition/hydration education as appropriate  - Include patient/family/caregiver in decisions related to nutrition   Outcome: Progressing      Problem: Prexisting or High Potential for Compromised Skin Integrity  Goal: Skin integrity is maintained or improved  INTERVENTIONS:  - Identify patients at risk for skin breakdown  - Assess and monitor skin integrity  - Assess and monitor nutrition and hydration status  - Monitor labs (i e  albumin)  - Assess for incontinence   - Turn and reposition patient  - Assist with mobility/ambulation  - Relieve pressure over bony prominences  - Avoid friction and shearing  - Provide appropriate hygiene as needed including keeping skin clean and dry  - Evaluate need for skin moisturizer/barrier cream  - Collaborate with interdisciplinary team (i e  Nutrition, Rehabilitation, etc )   - Patient/family teaching   Outcome: Progressing

## 2018-06-04 NOTE — CONSULTS
Consultation - Infectious Disease   Adama Garcia 80 y o  female MRN: [id number]  Unit/Bed#: -01 Encounter: [id number]      IMPRESSION & RECOMMENDATIONS:   Impression/Recommendations:  1  Hemoptysis  May be secondary to underlying bronchiectasis versus scar  CT chest more consistent with old TB versus active infection  Sputum AFB negative x2  Negative procalcitonin make acute bacterial infection unlikely  Clinically stable without sepsis  Rec:  · Continue to follow closely off antibiotics  · Check 1 more sputum AFB  If negative can discontinue airborne precautions  · Await outside [employer] records to confirm completion of TB therapy  · Await Pulmonary consultation    2  History of TB  Status post reported treatment course  CT chest more consistent with old TB versus active infection  Rec:  · Follow-up AFBs as above  · Await outside records as above    3  Dementia    Discussed in detail with Dr Bertin Kat    Antibiotics:  None    Thank you for this consultation  We will follow along with you  HISTORY OF PRESENT ILLNESS:  Reason for Consult: Hemoptysis, history of TB    HPI: Adama Garcia is a 80 y o  female with a history of dementia  She is somewhat of a poor historian so the history is obtained both from patient as well as the medical record  Patient presented to the emergency department on 06/01 with several days of coughing up blood  According to the chart the patient has a history of tuberculosis treated at [address] [place] [address] approximately 4 years ago  No records are available at this time  Upon presentation she was noted to be afebrile with a normal heart rate and WBC  A procalcitonin was negative  She underwent a chest x-ray which showed fine reticular nodule interstitial lung disease  A CT of the chest was performed which showed biapical scarring with diffuse bronchiectasis  There was no evidence of acute consolidation to suggest active infection    She has been observed off antibiotics  She has 2 AFB smears which are negative  Since admission she has remained afebrile  We are asked to comment on further evaluation and management  REVIEW OF SYSTEMS:  Patient states that her arm op doses has resolved since admission  She denies any recent weight loss and states that her appetite is good  She denies any current fevers or night sweats  A complete system-based review of systems is otherwise negative  PAST MEDICAL HISTORY:  Past Medical History:   Diagnosis Date    Dementia     Hypertension     Ovarian cyst      Past Surgical History:   Procedure Laterality Date    APPENDECTOMY      COLONOSCOPY      COLONOSCOPY N/A 10/5/2017    Procedure: COLONOSCOPY;  Surgeon: Dara Cantu MD;  Location: [facility]; Service: Gastroenterology    OVARIAN CYST REMOVAL         FAMILY HISTORY:  Non-contributory    SOCIAL HISTORY:  History   Alcohol Use No     History   Drug Use No     History   Smoking Status    Current Every Day Smoker    Packs/day: 1 00    Types: Cigarettes   Smokeless Tobacco    Never Used       ALLERGIES:  Allergies   Allergen Reactions    Penicillins Other (See Comments)     Unknown reaction       MEDICATIONS:  All current active medications have been reviewed      PHYSICAL EXAM:  Vitals:  HR:  [66-69] 69  Resp:  [18] 18  BP: (137-158)/(58-62) 137/62  SpO2:  [97 %] 97 %  Temp (24hrs), Av 3 °F (36 8 °C), Min:98 °F (36 7 °C), Max:98 5 °F (36 9 °C)  Current: Temperature: 98 5 °F (36 9 °C)     Physical Exam:  General:  Frail, thin elderly female, in no acute distress  Eyes:  Conjunctive clear with no hemorrhages or effusions  Oropharynx:  No ulcers, no lesions  Neck:  Supple, no lymphadenopathy  Lungs:  Clear to auscultation bilaterally, no accessory muscle use  Cardiac:  Regular rate and rhythm, no murmurs  Abdomen:  Soft, non-tender, non-distended  Extremities:  No peripheral cyanosis, clubbing, or edema  Skin:  No rashes, no ulcers  Neurological:  Moves all four extremities spontaneously, sensation grossly intact    LABS, IMAGING, & OTHER STUDIES:  Lab Results:  I have personally reviewed pertinent labs  Results from last 7 days  Lab Units 06/02/18  0445 06/01/18  1351   SODIUM mmol/L 144 140   POTASSIUM mmol/L 4 6 5 4*   CHLORIDE mmol/L 110* 104   CO2 mmol/L 29 28   ANION GAP mmol/L 5 8   BUN mg/dL 25 18   CREATININE mg/dL 1 17 1 08   EGFR ml/min/1 73sq m 42 46   GLUCOSE RANDOM mg/dL 99 93   CALCIUM mg/dL 7 6* 8 3   AST U/L  --  24   ALT U/L  --  12   ALK PHOS U/L  --  93   TOTAL PROTEIN g/dL  --  7 1   BILIRUBIN TOTAL mg/dL  --  0 50       Results from last 7 days  Lab Units 06/02/18  0445 06/01/18  1351   WBC Thousand/uL 7 01 6 78   HEMOGLOBIN g/dL 13 4 14 0   PLATELETS Thousands/uL 248 254       Results from last 7 days  Lab Units 06/01/18  1352 06/01/18  1351   BLOOD CULTURE  No Growth at 48 hrs  No Growth at 48 hrs  Sputum AFB negative x2    Imaging Studies:   I have personally reviewed pertinent imaging study reports and images in PACS  CT chest 6/2 by lateral apical scarring with diffuse bronchiectasis likely residual from prior TB infection  No active airspace consolidation  EKG, Pathology, and Other Studies:   I have personally reviewed pertinent reports

## 2018-06-05 PROBLEM — R91.8 LUNG MASS: Status: ACTIVE | Noted: 2018-06-05

## 2018-06-05 PROCEDURE — 99232 SBSQ HOSP IP/OBS MODERATE 35: CPT | Performed by: FAMILY MEDICINE

## 2018-06-05 PROCEDURE — 99231 SBSQ HOSP IP/OBS SF/LOW 25: CPT | Performed by: INTERNAL MEDICINE

## 2018-06-05 PROCEDURE — 99232 SBSQ HOSP IP/OBS MODERATE 35: CPT | Performed by: INTERNAL MEDICINE

## 2018-06-05 PROCEDURE — 86480 TB TEST CELL IMMUN MEASURE: CPT | Performed by: FAMILY MEDICINE

## 2018-06-05 RX ADMIN — AMLODIPINE BESYLATE 5 MG: 5 TABLET ORAL at 10:24

## 2018-06-05 RX ADMIN — NICOTINE 1 PATCH: 14 PATCH, EXTENDED RELEASE TRANSDERMAL at 10:23

## 2018-06-05 NOTE — PROGRESS NOTES
[facility] Note Chantel Bunch [birth year], 80 y o  female MRN: [id number]    Unit/Bed#: -01 Encounter: [id number]    Primary Care Provider: Jumana Pastor MD   Date and time admitted to hospital: 6/1/2018  1:09 PM        Lung mass   Assessment & Plan    CT shows 1 cm spiculated mass in the low  Patient is long-time smoker  Patient will need CT scan and Pulmonary follow-up as outpatient  Will need PFTs  Malnutrition ([address] )   Assessment & Plan    Malnutrition Findings:           BMI Findings:  BMI Classifications: Underweight < 18 5 (very low body wt but weight gain noted last 7 months  Does appear cachectic but in UBW range )     Body mass index is 15 66 kg/m²  Nutrition consult  Unclear source - may be decreased oral intake but patient reports great appetite  History of TB (tuberculosis)   Assessment & Plan    As per patient's daughter patient was treated for TB 40 years ago at [facility] in [place]  Records requested and pending  Check AFB smear x3  Continue negative isolation until cultures come back negative    Id consult:  Appreciated agrees with no antibiotic therapy at this time  Agrees with joint obtain confirmation of few min for TB  I called the hospital in the health department in [facility], no body seems to have any information for me  Will try again in a m  patient's daughter        Dementia   Assessment & Plan    Currently patient is alert, oriented x3  Encourage re- orientation, delirium precautions        * Hemoptysis   Assessment & Plan    Patient does have prior history of tuberculosis and was treated 4 years ago  Patient is current active smoker  My differentials-recurrent TB versus pneumonia versus bronchitis versus malignancy    ED physician discussed with Infectious Diseases who recommended to collect AFB smear x3 every 8 hr and continue supportive care at this time  No antibioitics recommended on admission per my review of notes     CT chest, rule out any mass or malignancy:pending  Consult pulmonology and ID for further evaluation of hemoptysis    Will check procalcitonin  If positive will speak with oncall ID about abx  Procalcitonin within normal limits no antibiotics added  Patient has not had any further hemoptysis  Patient will need repeat CT scan in several weeks to months  and pulmonary consult in a m  Stewart King Await cultures for T B  Called [place] and [facility] Department in [facility] the which is not currently active  Will need to try [facility] Department in a m  will ask the ID nurse to get records  Di quantieferon gold  Spoke to the hospital supervisor to try to get records  Potential discharge later today depending records and if AFB is negative            VTE Pharmacologic Prophylaxis:   Pharmacologic: Pharmacologic VTE Prophylaxis contraindicated due to Hemoptysis  Mechanical VTE Prophylaxis in Place: Yes    Patient Centered Rounds: I have performed bedside rounds with nursing staff today  Discussions with Specialists or Other Care Team Provider:  Infectious disease    Education and Discussions with Family / Patient:  Daughter    Time Spent for Care: 20 minutes  More than 50% of total time spent on counseling and coordination of care as described above  Current Length of Stay: 4 day(s)    Current Patient Status: Inpatient   Certification Statement: The patient will continue to require additional inpatient hospital stay due to Needing to follow up the AFB in need to get records treatment for patient's TB    Discharge Plan:   [place] discharge either today or tomorrow    Code Status: Level 1 - Full Code      Subjective:   Patient seen examined  States she feels okay  No complaints    Objective:     Vitals:   Temp (24hrs), Av 1 °F (36 7 °C), Min:98 °F (36 7 °C), Max:98 1 °F (36 7 °C)    HR:  [60-64] 60  Resp:  [18] 18  BP: (130-158)/(56-67) 154/67  SpO2:  [95 %-98 %] 98 %  Body mass index is 15 66 kg/m²       Input and Output Summary (last 24 hours): Intake/Output Summary (Last 24 hours) at 06/05/18 1151  Last data filed at 06/05/18 1012   Gross per 24 hour   Intake             1405 ml   Output             1100 ml   Net              305 ml       Physical Exam:     Physical Exam  (   General Appearance:    Alert, cooperative, no distress, appears stated age                               Lungs:     Clear to auscultation bilaterally, respirations unlabored       Heart:    Regular rate and rhythm, S1 and S2 normal, no murmur, rub    or gallop   Abdomen:     Soft, non-tender, bowel sounds active all four quadrants,     no masses, no organomegaly           Extremities:   Extremities normal, atraumatic, no cyanosis or edema       Additional Data:     Labs:      Results from last 7 days  Lab Units 06/02/18  0445   WBC Thousand/uL 7 01   HEMOGLOBIN g/dL 13 4   HEMATOCRIT % 42 5   PLATELETS Thousands/uL 248   NEUTROS PCT % 68   LYMPHS PCT % 20   MONOS PCT % 9   EOS PCT % 3       Results from last 7 days  Lab Units 06/02/18  0445 06/01/18  1351   SODIUM mmol/L 144 140   POTASSIUM mmol/L 4 6 5 4*   CHLORIDE mmol/L 110* 104   CO2 mmol/L 29 28   BUN mg/dL 25 18   CREATININE mg/dL 1 17 1 08   CALCIUM mg/dL 7 6* 8 3   TOTAL PROTEIN g/dL  --  7 1   BILIRUBIN TOTAL mg/dL  --  0 50   ALK PHOS U/L  --  93   ALT U/L  --  12   AST U/L  --  24   GLUCOSE RANDOM mg/dL 99 93       Results from last 7 days  Lab Units 06/01/18  1351   INR  1 03                 * I Have Reviewed All Lab Data Listed Above  * Additional Pertinent Lab Tests Reviewed: [facility]        Recent Cultures (last 7 days):       Results from last 7 days  Lab Units 06/01/18  1352 06/01/18  1351   BLOOD CULTURE  No Growth at 72 hrs  No Growth at 72 hrs         Last 24 Hours Medication List:     Current Facility-Administered Medications:  acetaminophen 650 mg Oral Q6H PRN Dayne Lehman MD    amLODIPine 5 mg Oral Daily Dayne Lehman MD nicotine 1 patch Transdermal Daily [place] Cornelius MD    ondansetron 4 mg Intravenous Q6H PRN [place] Cornelius MD    sodium chloride (PF) 3 mL Intravenous PRN Verner Breen, DO    sodium chloride 75 mL/hr Intravenous Continuous [place] HERBERT Sams Last Rate: 75 mL/hr (06/04/18 2201)        Today, Patient Was Seen By: Mary Rose MD    ** Please Note: Dictation voice to text software may have been used in the creation of this document   **

## 2018-06-05 NOTE — ASSESSMENT & PLAN NOTE
Patient does have prior history of tuberculosis and was treated 4 years ago  Patient is current active smoker  My differentials-recurrent TB versus pneumonia versus bronchitis versus malignancy    ED physician discussed with Infectious Diseases who recommended to collect AFB smear x3 every 8 hr and continue supportive care at this time  No antibioitics recommended on admission per my review of notes  CT chest, rule out any mass or malignancy:pending  Consult pulmonology and ID for further evaluation of hemoptysis    Will check procalcitonin  If positive will speak with oncall ID about abx  Procalcitonin within normal limits no antibiotics added  Patient has not had any further hemoptysis  Patient will need repeat CT scan in several weeks to months  and pulmonary consult in [place] Await cultures for T B  Called [place], and Health Department in [facility] the which is not currently active  Will need to try [facility], [facility] in [place]

## 2018-06-05 NOTE — CASE MANAGEMENT
Continued Stay Review    Date:    6/5/2018    Vital Signs: /67 (BP Location: Left arm)   Pulse 60   Temp 98 1 °F (36 7 °C) (Oral)   Resp 18   Ht 5' 2" (1 575 m)   Wt 38 8 kg (85 lb 9 6 oz)   SpO2 98%   BMI 15 66 kg/m²     Medications:   Scheduled Meds:   Current Facility-Administered Medications:  acetaminophen 650 mg Oral Q6H PRN Kamron Kamara MD   amLODIPine 5 mg Oral Daily Dori Young MD   nicotine 1 patch Transdermal Daily Dori Young MD   ondansetron 4 mg Intravenous Q6H PRN Dori Young MD   sodium chloride (PF) 3 mL Intravenous PRN Alverto Diamond DO     Continuous Infusions:    PRN Meds:   acetaminophen    ondansetron    sodium chloride (PF)    Abnormal Labs/Diagnostic Results:   No labs  6/5    Age/Sex: 80 y o  female     Assessment/Plan:   Lung mass   Assessment & Plan     CT shows 1 cm spiculated mass in the low  Patient is long-time smoker  Patient will need CT scan and Pulmonary follow-up as outpatient  Will need PFTs          Malnutrition ([facility] [address] )   Assessment & Plan     Malnutrition Findings:            BMI Findings:  BMI Classifications: Underweight < 18 5 (very low body wt but weight gain noted last 7 months  Does appear cachectic but in UBW range )     Body mass index is 15 66 kg/m²  Nutrition consult  Unclear source - may be decreased oral intake but patient reports great appetite           History of TB (tuberculosis)   Assessment & Plan     As per patient's daughter patient was treated for TB 40 years ago at [facility] in [place]  Records requested and pending  Check AFB smear x3  Continue negative isolation until cultures come back negative     Id consult:  Appreciated agrees with no antibiotic therapy at this time  Agrees with joint obtain confirmation of few min for TB  I called the hospital in the health department in [facility], no body seems to have any information for me    Will try again in a m  patient's daughter           Dementia Assessment & Plan     Currently patient is alert, oriented x3      Encourage re- orientation, delirium precautions        Hemoptysis   Assessment & Plan     Patient does have prior history of tuberculosis and was treated 4 years ago  Patient is current active smoker  My differentials-recurrent TB versus pneumonia versus bronchitis versus malignancy     ED physician discussed with Infectious Diseases who recommended to collect AFB smear x3 every 8 hr and continue supportive care at this time  No antibioitics recommended on admission per my review of notes     CT chest, rule out any mass or malignancy:pending  Consult pulmonology and ID for further evaluation of hemoptysis             Discharge Plan:   home

## 2018-06-05 NOTE — PROGRESS NOTES
Progress Note - Pulmonary   [place] 80 y o  female MRN: [id number]  Unit/Bed#: -01 Encounter: [id number]    Assessment:  1  Isolated episode of hemoptysis with history of tuberculosis  2  Abnormal CT of chest with chronic bronchiectasis/scarring and 1 cm spiculated left lower lobe nodule  3  Exertional dyspnea  4  Tobacco abuse    Plan:  1  Isolated episode of hemoptysis with history of tuberculosis  -currently saturating 97% on room air  -continue isolation precautions  -afebrile, no leukocytosis, asymptomatic, normal procalcitonin < 05, no acute infiltrate/cavitary lesion; agree with monitoring off antibiotics  - AFB culture (-) x 2/3; final AFB pending  -isolated episode of minimal hemoptysis likely related to diffuse bronchiectasis? Does not appear to be an infectious process or active TB  Will continue to monitor closely  2   Abnormal CT of chest with chronic bronchiectasis/scarring and 1 cm spiculated left lower lobe nodule  -reviewed CT of chest with chronic bronchiectasis/scarring likely related to prior TB infection  -it is unclear whether patient was adequately treated previously? Primary team is attempting to obtain previous records from [facility]  -will need outpatient follow-up for 1 cm spiculated left lower lobe nodule; can discuss with family/patient further about repeat CT of chest in 3 mo; consider PET/CT pending CT results  3  Exertional dyspnea  -given extensive smoking history likely component of COPD  -will need outpatient PFTs with [facility]  -no acute intervention necessary at this time  4  Tobacco abuse  -strongly encouraged patient to quit smoking  -she continues to smoke 1 pack per day; continue NRT    Awaiting confirmation of completion of treatment for TB years  ago and final AFB culture prior to D/C    Chief Complaint:   cough    Subjective:   Patient states she is confused about what is going on and is requesting someone contact her daughter   Denies fever, chills, hemoptysis, chest pain, or SOB  Mild cough, but this is stable from prior  Objective:     Vitals: Blood pressure 154/67, pulse 60, temperature 98 1 °F (36 7 °C), temperature source Oral, resp  rate 18, height 5' 2" (1 575 m), weight 38 8 kg (85 lb 9 6 oz), SpO2 98 %  ,Body mass index is 15 66 kg/m²  Intake/Output Summary (Last 24 hours) at 06/05/18 1238  Last data filed at 06/05/18 1012   Gross per 24 hour   Intake             1405 ml   Output             1100 ml   Net              305 ml       Invasive Devices     Peripheral Intravenous Line            Peripheral IV 06/02/18 Right Antecubital 2 days                Physical Exam: /67 (BP Location: Left arm)   Pulse 60   Temp 98 1 °F (36 7 °C) (Oral)   Resp 18   Ht 5' 2" (1 575 m)   Wt 38 8 kg (85 lb 9 6 oz)   SpO2 98%   BMI 15 66 kg/m²   General appearance: alert and oriented, in no acute distress and cachectic  Head: Normocephalic, without obvious abnormality, atraumatic  Lungs: clear to auscultation bilaterally  Heart: regular rate and rhythm, S1, S2 normal, no murmur, click, rub or gallop  Extremities: extremities normal, warm and well-perfused; no cyanosis, clubbing, or edema  Skin: Skin color, texture, turgor normal  No rashes or lesions  Neurologic: Mental status: Alert, oriented, thought content appropriate     Labs: I have personally reviewed pertinent lab results  , Imaging and other studies: I have personally reviewed pertinent reports

## 2018-06-05 NOTE — PROGRESS NOTES
Progress Note Wilfred Molina [birth year], 80 y o  female MRN: [id number]    Unit/Bed#: -01 Encounter: [id number]    Primary Care Provider: Doris Rolle MD   Date and time admitted to hospital: 6/1/2018  1:09 PM        * Hemoptysis   Assessment & Plan    Patient does have prior history of tuberculosis and was treated 4 years ago  Patient is current active smoker  My differentials-recurrent TB versus pneumonia versus bronchitis versus malignancy    ED physician discussed with Infectious Diseases who recommended to collect AFB smear x3 every 8 hr and continue supportive care at this time  No antibioitics recommended on admission per my review of notes  CT chest, rule out any mass or malignancy:pending  Consult pulmonology and ID for further evaluation of hemoptysis    Will check procalcitonin  If positive will speak with oncall ID about abx  Procalcitonin within normal limits no antibiotics added  Patient has not had any further hemoptysis  Patient will need repeat CT scan in several weeks to months  and pulmonary consult in a [facility] Await cultures for T B  Called [place], and Health Department in [facility] the which is not currently active  Will need to try [facility], [facility] in a   History of TB (tuberculosis)   Assessment & Plan    As per patient's daughter patient was treated for TB 40 years ago at [facility] in [place]  Records requested and pending  Check AFB smear x3  Continue negative isolation until cultures come back negative    Id consult:  Appreciated agrees with no antibiotic therapy at this time  Agrees with joint obtain confirmation of few min for TB  I called the hospital in the health department in [facility], no body seems to have any information for me  Will try again in a m  patient's daughter        Dementia   Assessment & Plan    Currently patient is alert, oriented x3      Encourage re- orientation, delirium precautions        Hypertension Assessment & Plan    Patient's daughter reports that patient is not taking her blood pressure medications regularly  Continue home dose of amlodipine and continue to monitor blood pressure  BP ok on norvasc   continue        Tobacco dependence   Assessment & Plan    Encourage cessation  Nicotine patch in place  Malnutrition ([address] )   Assessment & Plan    Malnutrition Findings:           BMI Findings:  BMI Classifications: Underweight < 18 5 (very low body wt but weight gain noted last 7 months  Does appear cachectic but in UBW range )     Body mass index is 15 66 kg/m²  Nutrition consult  Unclear source - may be decreased oral intake but patient reports great appetite  Hyperkalemia   Assessment & Plan    Mild on admission  Resolved with hydration  Lung mass   Assessment & Plan    CT shows 1 cm spiculated mass in the low  Patient is long-time smoker  Patient will need CT scan and Pulmonary follow-up as outpatient  Will need PFTs  VTE Pharmacologic Prophylaxis:   Pharmacologic: Pharmacologic VTE Prophylaxis contraindicated due to hemoptysis  Mechanical: Mechanical VTE prophylaxis in place  Patient Centered Rounds: discussed with nursing staff  Discussions with Specialists or Other Care Team Provider:   Reviewed wit ID and Pulmonary  hOther Care Team Provider:  Education and Discussions with Family / Patient:   Updated patient's daughter inDiscussions with Family / Patient:  Time Spent for Care: 45 minutes    If More than 50% of total time spent on counseling and coordination of care as described above indicate yes or no and described the counseling and coordination:  Yes, [facility] to discover records regarding possible TB therapy    Current Length of Stay: 3 day(s)  Current Patient Status: Inpatient   Certification Statement: The patient will continue to require additional inpatient hospital stay due to Need TB result before discharge    Discharge Plan: tbd  Code Status: Level 1 - Full Code    Subjective:   Patient states feeling fine  States she has kind of bored  Daughter reports that she does watch TV at home anyway  Patient cooperative with care reports no further my up to cysts and is not bringing up any other mucus  Objective:   Vitals:   Temp (24hrs), Av 1 °F (36 7 °C), Min:98 °F (36 7 °C), Max:98 1 °F (36 7 °C)    HR:  [60-64] 60  Resp:  [18] 18  BP: (130-158)/(56-67) 154/67  SpO2:  [95 %-98 %] 98 %  Body mass index is 15 66 kg/m²  Input and Output Summary (last 24 hours): Intake/Output Summary (Last 24 hours) at 18 0823  Last data filed at 18 0535   Gross per 24 hour   Intake             1405 ml   Output             1200 ml   Net              205 ml       Physical Exam:  Cachectic frail elderly female no acute distress disheveled    Normocephalic atraumatic pupils equal round and reactive to light extraocular muscles intact mucous membranes are moist neck is supple there is no JVD no lymph nodes no carotid bruits chest is decreased but clear to auscultation is no rhonchi rales or wheezes  Cardiovascular regular rate rhythm positive S1 and S2 no S3-S4 murmur or gallop  Abdomen scaphoid soft nontender nondistended with positive bowel sounds no hepatosplenomegaly no guarding or rebound  Neurologically awake alert oriented cranial are intact, she is pleasantly demented oriented to self and family    Physical Exam    Additional Data:   Labs:    Results from last 7 days  Lab Units 18  0445   WBC Thousand/uL 7 01   HEMOGLOBIN g/dL 13 4   HEMATOCRIT % 42 5   PLATELETS Thousands/uL 248   NEUTROS PCT % 68   LYMPHS PCT % 20   MONOS PCT % 9   EOS PCT % 3       Results from last 7 days  Lab Units 18  0445 18  1351   SODIUM mmol/L 144 140   POTASSIUM mmol/L 4 6 5 4*   CHLORIDE mmol/L 110* 104   CO2 mmol/L 29 28   BUN mg/dL 25 18   CREATININE mg/dL 1 17 1 08   CALCIUM mg/dL 7 6* 8 3   TOTAL PROTEIN g/dL  --  7 1   BILIRUBIN TOTAL mg/dL  --  0 50   ALK PHOS U/L  --  93   ALT U/L  --  12   AST U/L  --  24   GLUCOSE RANDOM mg/dL 99 93       Results from last 7 days  Lab Units 06/01/18  1351   INR  1 03       * I Have Reviewed All Lab Data Listed Above  * Additional Pertinent Lab Tests Reviewed: No New Labs Available For Today    Imaging:    Imaging Reports Reviewed Today Include:  none    Cultures:   Blood Culture:   Lab Results   Component Value Date    BLOODCX No Growth at 72 hrs  06/01/2018    BLOODCX No Growth at 72 hrs  06/01/2018     Urine Culture: No results found for: URINECX  Sputum Culture: No components found for: SPUTUMCX  Wound Culture: No results found for: WOUNDCULT    Last 24 Hours Medication List:     Current Facility-Administered Medications:  acetaminophen 650 mg Oral Q6H PRN Kamron Kamara MD    amLODIPine 5 mg Oral Daily Branden Terry MD    nicotine 1 patch Transdermal Daily Branden Terry MD    ondansetron 4 mg Intravenous Q6H PRN Branden Terry MD    sodium chloride (PF) 3 mL Intravenous PRN Michelle Arana DO    sodium chloride 75 mL/hr Intravenous Continuous Pinky Calk, PA-C Last Rate: 75 mL/hr (06/04/18 2201)        Today, Patient Was Seen By: Kiley Metz MD    ** Please Note: Dragon 360 Dictation voice to text software may have been used in the creation of this document   **

## 2018-06-05 NOTE — PROGRESS NOTES
Progress Note - Infectious Disease   [facility] 80 y o  female MRN: [id number]  Unit/Bed#: -01 Encounter: [id number]      Impression/Recommendations:  1  Hemoptysis  May be secondary to underlying bronchiectasis versus scar  CT chest more consistent with old TB versus active infection  Sputum AFB negative x2  Negative procalcitonin make acute bacterial infection unlikely  Clinically stable without sepsis  Rec:  ? Continue to follow closely off antibiotics  ? Follow up 3rd sputum AFB  If negative can discontinue airborne precautions  ? Await outside [employer] records to confirm completion of TB therapy  ? Outpatient follow-up with pulmonary after D/C with repeat imaging     2  History of TB  Status post reported treatment course  CT chest more consistent with old TB versus active infection  Rec:  ? Follow-up AFBs as above  ? Await outside records as above     3  Dementia     Once 3rd AFB is resulted as negative and we have confirmed TB treatment history, would be stable from an ID standpoint for discharge home with close outpatient follow-up     Antibiotics:  None    Subjective:  Patient seen on AM rounds  Feels well and offers no new complaints  No further episodes of hemoptysis  24 Hour Events:  No documented fevers, chills, sweats, nausea, vomiting, or diarrhea  Has 2-AFB, and the 3rd is pending in the lab  Objective:  Vitals:  HR:  [60-64] 60  Resp:  [18] 18  BP: (130-158)/(56-67) 154/67  SpO2:  [95 %-98 %] 98 %  Temp (24hrs), Av 1 °F (36 7 °C), Min:98 °F (36 7 °C), Max:98 1 °F (36 7 °C)  Current: Temperature: 98 1 °F (36 7 °C)    Physical Exam:   General:  No acute distress  Psychiatric:  Awake and alert  Pulmonary:  Normal respiratory excursion without accessory muscle use  Abdomen:  Soft, nontender  Extremities:  No edema  Skin:  No rashes    Lab Results:  I have personally reviewed pertinent labs      Results from last 7 days  Lab Units 18  1465 18  9874 SODIUM mmol/L 144 140   POTASSIUM mmol/L 4 6 5 4*   CHLORIDE mmol/L 110* 104   CO2 mmol/L 29 28   ANION GAP mmol/L 5 8   BUN mg/dL 25 18   CREATININE mg/dL 1 17 1 08   EGFR ml/min/1 73sq m 42 46   GLUCOSE RANDOM mg/dL 99 93   CALCIUM mg/dL 7 6* 8 3   AST U/L  --  24   ALT U/L  --  12   ALK PHOS U/L  --  93   TOTAL PROTEIN g/dL  --  7 1   BILIRUBIN TOTAL mg/dL  --  0 50       Results from last 7 days  Lab Units 06/02/18  0445 06/01/18  1351   WBC Thousand/uL 7 01 6 78   HEMOGLOBIN g/dL 13 4 14 0   PLATELETS Thousands/uL 248 254       Results from last 7 days  Lab Units 06/01/18  1352 06/01/18  1351   BLOOD CULTURE  No Growth at 72 hrs  No Growth at 72 hrs  Imaging Studies:   I have personally reviewed pertinent imaging study reports and images in PACS  EKG, Pathology, and Other Studies:   I have personally reviewed pertinent reports

## 2018-06-05 NOTE — ASSESSMENT & PLAN NOTE
CT shows 1 cm spiculated mass in the low  Patient is long-time smoker  Patient will need CT scan and Pulmonary follow-up as outpatient  Will need PFTs

## 2018-06-05 NOTE — SOCIAL WORK
LOS: 4 CM s/w pt's daughter via phone to complete open as pt has hx of dementia and is forgetful  Pt lives in [place] with her daughter  Pt lives in 2 story home but per daughter has first floor living with 2-3 steps w/railings to enter  Pt has walker but does not use it  Pt's daughter assists with all ADL's  Pt has hx of PVM and STR in [place] but daughter was unsure of name  Pt has hx of HHC with [facility]  Pt uses [facility] in [place]  Pt does not have POA/AD and pt's daughter declined information  Pt's daughter assists with all transportation needs  Pt has no needs at this time  CM department to follow pt through dc

## 2018-06-05 NOTE — PLAN OF CARE
Problem: RESPIRATORY - ADULT  Goal: Achieves optimal ventilation and oxygenation  INTERVENTIONS:  - Assess for changes in respiratory status  - Assess for changes in mentation and behavior  - Position to facilitate oxygenation and minimize respiratory effort  - Oxygen administration by appropriate delivery method based on oxygen saturation (per order) or ABGs  - Initiate smoking cessation education as indicated  - Encourage broncho-pulmonary hygiene including cough, deep breathe, Incentive Spirometry  - Assess the need for suctioning and aspirate as needed  - Assess and instruct to report SOB or any respiratory difficulty  - Respiratory Therapy support as indicated   Outcome: Progressing      Problem: INFECTION - ADULT  Goal: Absence or prevention of progression during hospitalization  INTERVENTIONS:  - Assess and monitor for signs and symptoms of infection  - Monitor lab/diagnostic results  - Monitor all insertion sites, i e  indwelling lines, tubes, and drains  - Monitor endotracheal (as able) and nasal secretions for changes in amount and color  - [place] appropriate cooling/warming therapies per order  - Administer medications as ordered  - Instruct and encourage patient and family to use good hand hygiene technique  - Identify and instruct in appropriate isolation precautions for identified infection/condition   Outcome: Progressing      Problem: SAFETY ADULT  Goal: Patient will remain free of falls  INTERVENTIONS:  - Assess patient frequently for physical needs  -  Identify cognitive and physical deficits and behaviors that affect risk of falls    -  [place] fall precautions as indicated by assessment   - Educate patient/family on patient safety including physical limitations  - Instruct patient to call for assistance with activity based on assessment  - Modify environment to reduce risk of injury  - Consider OT/PT consult to assist with strengthening/mobility    Outcome: Progressing      Problem: DISCHARGE PLANNING  Goal: Discharge to home or other facility with appropriate resources  INTERVENTIONS:  - Identify barriers to discharge w/patient and caregiver  - Arrange for needed discharge resources and transportation as appropriate  - Identify discharge learning needs (meds, wound care, etc )  - Arrange for interpretive services to assist at discharge as needed  - Refer to Case Management Department for coordinating discharge planning if the patient needs post-hospital services based on physician/advanced practitioner order or complex needs related to functional status, cognitive ability, or social support system   Outcome: Progressing      Problem: Knowledge Deficit  Goal: Patient/family/caregiver demonstrates understanding of disease process, treatment plan, medications, and discharge instructions  Complete learning assessment and assess knowledge base  Interventions:  - Provide teaching at level of understanding  - Provide teaching via preferred learning methods   Outcome: Progressing      Problem: Nutrition/Hydration-ADULT  Goal: Nutrient/Hydration intake appropriate for improving, restoring or maintaining nutritional needs  Monitor and assess patient's nutrition/hydration status for malnutrition (ex- brittle hair, bruises, dry skin, pale skin and conjunctiva, muscle wasting, smooth red tongue, and disorientation)  Collaborate with interdisciplinary team and initiate plan and interventions as ordered  Monitor patient's weight and dietary intake as ordered or per policy  Utilize nutrition screening tool and intervene per policy  Determine patient's food preferences and provide high-protein, high-caloric foods as appropriate       INTERVENTIONS:  - Monitor oral intake, urinary output, labs, and treatment plans  - Assess nutrition and hydration status and recommend course of action  - Evaluate amount of meals eaten  - Assist patient with eating if necessary   - Allow adequate time for meals  - Recommend/ encourage appropriate diets, oral nutritional supplements, and vitamin/mineral supplements  - Order, calculate, and assess calorie counts as needed  - Assess need for intravenous fluids  - Provide specific nutrition/hydration education as appropriate  - Include patient/family/caregiver in decisions related to nutrition   Outcome: Progressing      Problem: Prexisting or High Potential for Compromised Skin Integrity  Goal: Skin integrity is maintained or improved  INTERVENTIONS:  - Identify patients at risk for skin breakdown  - Assess and monitor skin integrity  - Assess and monitor nutrition and hydration status  - Monitor labs (i e  albumin)  - Assess for incontinence   - Turn and reposition patient  - Assist with mobility/ambulation  - Relieve pressure over bony prominences  - Avoid friction and shearing  - Provide appropriate hygiene as needed including keeping skin clean and dry  - Evaluate need for skin moisturizer/barrier cream  - Collaborate with interdisciplinary team (i e  Nutrition, Rehabilitation, etc )   - Patient/family teaching   Outcome: Progressing      Problem: Potential for Falls  Goal: Patient will remain free of falls  INTERVENTIONS:  - Assess patient frequently for physical needs  -  Identify cognitive and physical deficits and behaviors that affect risk of falls    -  [place] fall precautions as indicated by assessment   - Educate patient/family on patient safety including physical limitations  - Instruct patient to call for assistance with activity based on assessment  - Modify environment to reduce risk of injury  - Consider OT/PT consult to assist with strengthening/mobility    Outcome: Progressing

## 2018-06-05 NOTE — ASSESSMENT & PLAN NOTE
As per patient's daughter patient was treated for TB 40 years ago at [facility] in [place]  Records requested and pending  Check AFB smear x3  Continue negative isolation until cultures come back negative    Id consult:  Appreciated agrees with no antibiotic therapy at this time  Agrees with joint obtain confirmation of few min for TB  I called the hospital in the health department in [facility], no body seems to have any information for me    Will try again in a m  patient's daughter

## 2018-06-05 NOTE — ASSESSMENT & PLAN NOTE
As per patient's daughter patient was treated for TB 40 years ago at [facility] in [address] [facility]  Records requested and pending  Check AFB smear x3  Continue negative isolation until cultures come back negative    Id consult:  Appreciated agrees with no antibiotic therapy at this time  Agrees with joint obtain confirmation of few min for TB  I called the hospital in the health department in [facility], no body seems to have any information for me    Will try again in a m  patient's daughter

## 2018-06-05 NOTE — ASSESSMENT & PLAN NOTE
Patient does have prior history of tuberculosis and was treated 4 years ago  Patient is current active smoker  My differentials-recurrent TB versus pneumonia versus bronchitis versus malignancy    ED physician discussed with Infectious Diseases who recommended to collect AFB smear x3 every 8 hr and continue supportive care at this time  No antibioitics recommended on admission per my review of notes  CT chest, rule out any mass or malignancy:pending  Consult pulmonology and ID for further evaluation of hemoptysis    Will check procalcitonin  If positive will speak with oncall ID about abx  Procalcitonin within normal limits no antibiotics added  Patient has not had any further hemoptysis  Patient will need repeat CT scan in several weeks to months  and pulmonary consult in a m  Sean Henao Await cultures for T B  Called [place], and Health Department in [facility] the which is not currently active  Will need to try [facility], [facility] Department in a m  will ask the ID nurse to get records  Di quantieferon gold  Spoke to the hospital supervisor to try to get records    Potential discharge later today depending records and if AFB is negative

## 2018-06-05 NOTE — PLAN OF CARE
Problem: DISCHARGE PLANNING - CARE MANAGEMENT  Goal: Discharge to post-acute care or home with appropriate resources  INTERVENTIONS:  - Conduct assessment to determine patient/family and health care team treatment goals, and need for post-acute services based on payer coverage, community resources, and patient preferences, and barriers to discharge  - Address psychosocial, clinical, and financial barriers to discharge as identified in assessment in conjunction with the patient/family and health care team  - Arrange appropriate level of post-acute services according to patients   needs and preference and payer coverage in collaboration with the physician and health care team  - Communicate with and update the patient/family, physician, and health care team regarding progress on the discharge plan  - Arrange appropriate transportation to post-acute venues  Outcome: Progressing  LOS: 4 CM s/w pt's daughter via phone to complete open as pt has hx of dementia and is forgetful  Pt lives in [place] with her daughter  Pt lives in 2 story home but per daughter has first floor living with 2-3 steps w/railings to enter  Pt has walker but does not use it  Pt's daughter assists with all ADL's  Pt has hx of PVM and STR in [place] but daughter was unsure of name  Pt has hx of HHC with [facility]  Pt uses [facility] in [place]  Pt does not have POA/AD and pt's daughter declined information  Pt's daughter assists with all transportation needs  Pt has no needs at this time  CM department to follow pt through dc

## 2018-06-06 ENCOUNTER — TRANSITIONAL CARE MANAGEMENT (OUTPATIENT)
Dept: FAMILY MEDICINE CLINIC | Facility: MEDICAL CENTER | Age: 83
End: 2018-06-06

## 2018-06-06 VITALS
HEIGHT: 62 IN | RESPIRATION RATE: 16 BRPM | DIASTOLIC BLOOD PRESSURE: 56 MMHG | WEIGHT: 85.6 LBS | BODY MASS INDEX: 15.75 KG/M2 | SYSTOLIC BLOOD PRESSURE: 121 MMHG | OXYGEN SATURATION: 96 % | HEART RATE: 63 BPM | TEMPERATURE: 98.5 F

## 2018-06-06 LAB
BACTERIA BLD CULT: NORMAL
BACTERIA BLD CULT: NORMAL

## 2018-06-06 PROCEDURE — 99239 HOSP IP/OBS DSCHRG MGMT >30: CPT | Performed by: FAMILY MEDICINE

## 2018-06-06 PROCEDURE — 99231 SBSQ HOSP IP/OBS SF/LOW 25: CPT | Performed by: INTERNAL MEDICINE

## 2018-06-06 RX ADMIN — NICOTINE 1 PATCH: 14 PATCH, EXTENDED RELEASE TRANSDERMAL at 08:32

## 2018-06-06 RX ADMIN — AMLODIPINE BESYLATE 5 MG: 5 TABLET ORAL at 08:30

## 2018-06-06 NOTE — ASSESSMENT & PLAN NOTE
Patient does have prior history of tuberculosis and was treated 4 years ago  Patient is current active smoker  My differentials-recurrent TB versus pneumonia versus bronchitis versus malignancy  However getting the results back the patient likely had CHRISTOPHER  This was discussed with Infectious Disease nurse here at [facility] who spoke to the health department  ED physician discussed with Infectious Diseases who recommended to collect AFB smear x3 every 8 hr and continue supportive care at this time  No antibioitics recommended on admission per my review of notes  CT chest, rule out any mass or malignancy:pending  Pulmonology and ID evaluation were both improved    Will check procalcitonin  If positive will speak with oncall ID about abx  Procalcitonin within normal limits no antibiotics added  Patient has not had any further hemoptysis  Called [facility] Department in [facility] the which is not currently active  Will need to try [facility], [facility] Department in a m  will ask the ID nurse to get records  Di quantieferon gold  Spoke to the hospital supervisor to try to get records  discharge later today depending records since all 3 AFBs were negative  Upon further review and latest records in 2016 from the health peer of the patient had likely CHRISTOPHER

## 2018-06-06 NOTE — DISCHARGE SUMMARY
Discharge- Mihai Whatley [birth year], 80 y o  female MRN: [id number]    Unit/Bed#: -01 Encounter: [id number]    Primary Care Provider: Zakia Sapp MD   Date and time admitted to hospital: 6/1/2018  1:09 PM        * Hemoptysis   Assessment & Plan    Patient does have prior history of tuberculosis and was treated 4 years ago  Patient is current active smoker  My differentials-recurrent TB versus pneumonia versus bronchitis versus malignancy  However getting the results back the patient likely had CHRISTOPHER  This was discussed with Infectious Disease nurse here at [facility] who spoke to the health department  ED physician discussed with Infectious Diseases who recommended to collect AFB smear x3 every 8 hr and continue supportive care at this time  No antibioitics recommended on admission per my review of notes  CT chest, rule out any mass or malignancy:pending  Pulmonology and ID evaluation were both improved    Will check procalcitonin  If positive will speak with oncall ID about abx  Procalcitonin within normal limits no antibiotics added  Patient has not had any further hemoptysis  Called [place] and Health Department in [facility] the which is not currently active  Will need to try [facility], [facility] Department in a m  will ask the ID nurse to get records  Di quantieferon gold  Spoke to the hospital supervisor to try to get records  discharge later today depending records since all 3 AFBs were negative  Upon further review and latest records in 2016 from the health peer of the patient had likely CHRISTOPHER  Malnutrition ([address] )   Assessment & Plan    Malnutrition Findings:           BMI Findings:  BMI Classifications: Underweight < 18 5 (very low body wt but weight gain noted last 7 months  Does appear cachectic but in UBW range )     Body mass index is 15 66 kg/m²  Nutrition consult  Unclear source - may be decreased oral intake but patient reports great appetite          History of TB (tuberculosis)   Assessment & Plan    As per patient's daughter patient was treated for TB 4 years ago at [facility] in [place]  Records requested and pending  Check AFB smear x3  Continue negative isolation until cultures come back negative    Id consult:  Appreciated agrees with no antibiotic therapy at this time  It looks like the patient never had TB  Patient could have had CHRISTOPHER  Patient had a negative PPD last year  Hypertension   Assessment & Plan    Patient's daughter reports that patient is not taking her blood pressure medications regularly  Continue home dose of amlodipine and continue to monitor blood pressure  BP ok on norvasc   continue        Dementia   Assessment & Plan    Currently patient is alert, oriented x3  Encourage re- orientation, delirium precautions              Discharging Physician / Practitioner: Ignacio Negron MD  PCP: Randell Betancourt MD  Admission Date:   Admission Orders     Ordered        06/01/18 1513  Inpatient Admission (expected length of stay for this patient is greater than two midnights)  Once             Discharge Date: 06/06/18    Resolved Problems  Date Reviewed: 6/6/2018    None          Consultations During Hospital Stay:  · Pulmonology  · Infectious Disease    Procedures Performed:     · AFB in sputum which was negative x3    Significant Findings / Test Results:     CT scan of the chest:There is bilateral apical scarring  There is also diffuse bronchiectasis throughout the lung  These are typical of chronic findings of tuberculosis      There is no evidence of airspace consolidation to suggest active tuberculosis        There is a 1 cm spiculated left lower lobe pulmonary nodule (series 2 image 36 )  Consider PET/CT on a nonemergent basis, or follow-up chest CT to assess for likelihood of malignancy    Incidental Findings:   · See above     Test Results Pending at Discharge (will require follow up):    · None     Outpatient Tests Requested:  · Outpatient PET scan or follow-up CT scan    Complications:  None    Reason for Admission:  Hemoptysis    Hospital Course: Lisa Zeng is a 80 y o  female patient who originally presented to the hospital on 6/1/2018 due to hemoptysis  History of presenting illness: Lisa Zeng is a 80 y o  female with past medical history of extensive smoking, history of tuberculosis status post treatment, dementia who presents for evaluation of hemoptysis  According to patient's daughter the patient does have a history of tuberculosis and was treated for this approximately 40 years ago at [facility]   Over past several days patient was noted to have mild coughing episode followed by some blood in her sputum  No reported fevers or chills  Patient's daughter reported some exertional shortness of breath worsening over last few days  Currently patient is in reverse isolation for tuberculosis, denies any complaints at this time  Denies any chest pain, cough, shortness of breath  X-ray of the chest showed fine reticular nodular interstitial lung disease, finding of undetermined chronicity  ED physician discussed with Infectious Diseases who recommended to admit the patient, collected AFB smears x3, and continue supportive management  Hospital course: The patient was admitted for the above reasons  While she was here everything was fine  She had some more chronic or scarring on her CT scan  Her AFB came back negative x3  We did get in touch with the Department of Health  Our Infectious Disease nurse Kareen did speak to them  Patient apparently had CHRISTOPHER and negative PPD last year  They did want us to order a quantiferon gold for which they will follow up with  Patient has been afebrile and has been otherwise asymptomatic here  She will need to follow up with pulmonology and get a PET scan versus CT scan as an outpatient    In regards her modest she only had 1 episode of mild streaky hemoptysis in a patient with a history of bronchiectasis  Please see above list of diagnoses and related plan for additional information  Condition at Discharge: good     Discharge Day Visit / Exam:     Subjective:  Patient seen examined  She has no complaints today and feels fine  Vitals: Blood Pressure: 121/56 (06/06/18 0700)  Pulse: 63 (06/06/18 0700)  Temperature: 98 5 °F (36 9 °C) (06/06/18 0700)  Temp Source: Oral (06/06/18 0700)  Respirations: 16 (06/06/18 0700)  Height: 5' 2" (157 5 cm) (06/01/18 1310)  Weight - Scale: 38 8 kg (85 lb 9 6 oz) (06/01/18 1310)  SpO2: 96 % (06/06/18 0700)  Exam:   Physical Exam  (   General Appearance:    Alert, cooperative, no distress, appears stated age   Head:    Normocephalic, without obvious abnormality, atraumatic   Eyes:    PERRL, conjunctiva/corneas clear, EOM's intact,             Nose:   Nares normal, septum midline, mucosa normal   Throat:   Lips, mucosa, and tongue normal; teeth and gums normal   Neck:   Supple, symmetrical, no adenopathy;        thyroid:  No enlargement/tenderness/nodules; no carotid    bruit or JVD   Back:     Symmetric, no curvature, ROM normal, no CVA tenderness   Lungs:     Clear to auscultation bilaterally, respirations unlabored       Heart:    Regular rate and rhythm, S1 and S2 normal, no murmur, rub    or gallop   Abdomen:     Soft, non-tender, bowel sounds active all four quadrants,     no masses, no organomegaly           Extremities:   Extremities normal, atraumatic, no cyanosis or edema   Pulses:   2+ and symmetric all extremities   Skin:   Skin color, texture, turgor normal, no rashes or lesions   Lymph nodes:   Cervical, supraclavicular, and axillary nodes normal   Neurologic:   CNII-XII intact  Normal strength, sensation and reflexes       throughout    Be Sure to Include Physical Exam: Delete this entire line when you have entered your exam)  Discussion with Family:  Patient    Discussed with the daughter yesterday    Discharge instructions/Information to patient and family:   See after visit summary for information provided to patient and family  Provisions for Follow-Up Care:  See after visit summary for information related to follow-up care and any pertinent home health orders  Disposition:     Home with VNA Services (Reminder: Complete face to face encounter)    For Discharges to [facility] SNF:   · Not Applicable to this Patient - Not Applicable to this Patient    Planned Readmission:  Anticipated     Discharge Statement:  I spent 35 minutes discharging the patient  This time was spent on the day of discharge  I had direct contact with the patient on the day of discharge  Greater than 50% of the total time was spent examining patient, answering all patient questions, arranging and discussing plan of care with patient as well as directly providing post-discharge instructions  Additional time then spent on discharge activities  Time spent in discharging the patient, doing the discharge paperwork as discharge process and speaking to specialists involved in the care    Discharge Medications:  See after visit summary for reconciled discharge medications provided to patient and family        ** Please Note: This note has been constructed using a voice recognition system **

## 2018-06-06 NOTE — PROGRESS NOTES
Progress Note - Infectious Disease   Murel Notch 80 y o  female MRN: [id number]  Unit/Bed#: -01 Encounter: [id number]      Impression/Recommendations:  1   Hemoptysis  May be secondary to underlying bronchiectasis versus scar  CT chest more consistent with old versus active infection  Sputum AFB negative x3  Negative procalcitonin make acute bacterial infection unlikely  CT chest also suggests new nodule  Clinically stable without sepsis  Rec:  ? Continue to follow closely off antibiotics  ? Outpatient follow-up with pulmonary after D/C with repeat imaging     2   History of MAC  Status post reported treatment course  CT chest more consistent with old versus active infection  AFB smear negative x3 here  Rec:  ? No further workup or treatment from an ID perspective  ? 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  Her MTB PCR at that time was negative  Objective:  Vitals:  HR:  [62-63] 63  Resp:  [16-18] 16  BP: (114-150)/(56-68) 121/56  SpO2:  [95 %-97 %] 96 %  Temp (24hrs), Av 4 °F (36 9 °C), Min:98 °F (36 7 °C), Max:98 6 °F (37 °C)  Current: Temperature: 98 5 °F (36 9 °C)    Physical Exam:   General:  No acute distress  Psychiatric:  Awake and alert  Pulmonary:  Normal respiratory excursion without accessory muscle use  Abdomen:  Soft, nontender  Extremities:  No edema  Skin:  No rashes    Lab Results:  I have personally reviewed pertinent labs      Results from last 7 days  Lab Units 18  0445 18  1351   SODIUM mmol/L 144 140   POTASSIUM mmol/L 4 6 5 4* CHLORIDE mmol/L 110* 104   CO2 mmol/L 29 28   ANION GAP mmol/L 5 8   BUN mg/dL 25 18   CREATININE mg/dL 1 17 1 08   EGFR ml/min/1 73sq m 42 46   GLUCOSE RANDOM mg/dL 99 93   CALCIUM mg/dL 7 6* 8 3   AST U/L  --  24   ALT U/L  --  12   ALK PHOS U/L  --  93   TOTAL PROTEIN g/dL  --  7 1   BILIRUBIN TOTAL mg/dL  --  0 50       Results from last 7 days  Lab Units 06/02/18  0445 06/01/18  1351   WBC Thousand/uL 7 01 6 78   HEMOGLOBIN g/dL 13 4 14 0   PLATELETS Thousands/uL 248 254       Results from last 7 days  Lab Units 06/01/18  1352 06/01/18  1351   BLOOD CULTURE  No Growth After 4 Days  No Growth After 4 Days  AFB negative x3    Imaging Studies:   I have personally reviewed pertinent imaging study reports and images in PACS  EKG, Pathology, and Other Studies:   I have personally reviewed pertinent reports

## 2018-06-06 NOTE — ASSESSMENT & PLAN NOTE
As per patient's daughter patient was treated for TB 4 years ago at [facility] in [place]  Records requested and pending  Check AFB smear x3  Continue negative isolation until cultures come back negative    Id consult:  Appreciated agrees with no antibiotic therapy at this time  It looks like the patient never had TB  Patient could have had CHRISTOPHER  Patient had a negative PPD last year

## 2018-06-07 LAB
ANNOTATION COMMENT IMP: NORMAL
GAMMA INTERFERON BACKGROUND BLD IA-ACNC: 0.04 IU/ML
M TB IFN-G BLD-IMP: NEGATIVE
M TB IFN-G CD4+ BCKGRND COR BLD-ACNC: 0.03 IU/ML
M TB IFN-G CD4+ T-CELLS BLD-ACNC: 0.07 IU/ML
MITOGEN IGNF BLD-ACNC: 8.51 IU/ML
QUANTIFERON-TB GOLD IN TUBE: NORMAL
SERVICE CMNT-IMP: NORMAL

## 2018-06-08 ENCOUNTER — TELEPHONE (OUTPATIENT)
Dept: FAMILY MEDICINE CLINIC | Facility: MEDICAL CENTER | Age: 83
End: 2018-06-08

## 2018-06-08 NOTE — TELEPHONE ENCOUNTER
Pt was discharged from hospital 6/6/2018  Will it be ok if we put pt in for a heather 6/28/18 at 11:30 am? Pt is supposed to schedule an appt with pulmonary also  Daughter said she was going to call for an appt today with them

## 2018-06-11 ENCOUNTER — TELEPHONE (OUTPATIENT)
Dept: FAMILY MEDICINE CLINIC | Facility: MEDICAL CENTER | Age: 83
End: 2018-06-11

## 2018-06-13 LAB
LABORATORY COMMENT REPORT: NORMAL
M AVIUM CMPLX RRNA SPEC QL PROBE: POSITIVE
M GORDONAE RRNA SPEC QL PROBE: ABNORMAL
M KANSASII RRNA SPEC QL PROBE: ABNORMAL
M TB CMPLX RRNA SPEC QL PROBE: NEGATIVE
OTHER: ABNORMAL

## 2018-06-14 ENCOUNTER — TELEPHONE (OUTPATIENT)
Dept: PULMONOLOGY | Facility: CLINIC | Age: 83
End: 2018-06-14

## 2018-06-14 LAB
MYCOBACTERIUM SPEC CULT: ABNORMAL
RHODAMINE-AURAMINE STN SPEC: ABNORMAL
RHODAMINE-AURAMINE STN SPEC: ABNORMAL

## 2018-06-28 ENCOUNTER — OFFICE VISIT (OUTPATIENT)
Dept: FAMILY MEDICINE CLINIC | Facility: MEDICAL CENTER | Age: 83
End: 2018-06-28
Payer: MEDICARE

## 2018-06-28 VITALS
HEART RATE: 80 BPM | SYSTOLIC BLOOD PRESSURE: 160 MMHG | BODY MASS INDEX: 14.63 KG/M2 | WEIGHT: 80 LBS | DIASTOLIC BLOOD PRESSURE: 70 MMHG | RESPIRATION RATE: 16 BRPM

## 2018-06-28 DIAGNOSIS — R91.8 LUNG MASS: Primary | ICD-10-CM

## 2018-06-28 DIAGNOSIS — I10 ESSENTIAL HYPERTENSION: ICD-10-CM

## 2018-06-28 DIAGNOSIS — F02.80 DEMENTIA ASSOCIATED WITH OTHER UNDERLYING DISEASE WITHOUT BEHAVIORAL DISTURBANCE (HCC): ICD-10-CM

## 2018-06-28 DIAGNOSIS — F17.200 TOBACCO DEPENDENCE: Chronic | ICD-10-CM

## 2018-06-28 PROCEDURE — 99214 OFFICE O/P EST MOD 30 MIN: CPT | Performed by: FAMILY MEDICINE

## 2018-06-28 RX ORDER — MELATONIN
COMMUNITY
End: 2018-10-31

## 2018-06-28 RX ORDER — ASPIRIN 81 MG/1
TABLET ORAL
COMMUNITY
End: 2020-03-20 | Stop reason: HOSPADM

## 2018-06-28 NOTE — PROGRESS NOTES
Sia Josue is here for hospital f/u  Hospitalized several weeks ago  She was hosptalized at [facility] for hemoptysis  There was concern because of her past history of tuberculosis  She was treated at [facility] 4 years ago  Did not complete the antibiotic therapy  Or to hospital records it is felt that she most likely had anMAI did not have tuberculosis     Acid-fast bacilli were negative  Her CT scan showed a spiculated 1 cm nodule and biapical scarring  No findings suggestive of active TB  She has a lot of pain in her legs  Occurs with walking  No pain in ankles, feet or knees  Appetite is good  Daughter reports her dementia is about the same  She remains at home during the day  She does continue to smoke  O: /70 (Cuff Size: Standard)   Pulse 80   Resp 16   Wt 36 3 kg (80 lb)   BMI 14 63 kg/m²   BP by me 140/70  She is alert and answers my questions appropriately although memory is poor  Neck no adenopathy thyromegaly bruits  Chest clear with diffusely decreased breath sounds  Cardiac regular rate without murmur  Abdomen benign  Extremities no edema distal pulses 0-1    Assessment  1  [id number]-io obvious source although she does have a small slightly suspicious lung nodule  She was referred to pulmonology and she will be getting a follow-up CT scan  2  Dementia-stable  Discussed home care with daughter  3   Cigarette smoker-advised against patient smoking without supervision  4  Bilateral leg pain-suspect claudication-declines any further evaluation    Plan  Follow up with pulmonology as above     Recheck 4 months

## 2018-07-12 LAB — MISCELLANEOUS LAB TEST RESULT: NORMAL

## 2018-07-24 LAB
MYCOBACTERIUM SPEC CULT: NORMAL
RHODAMINE-AURAMINE STN SPEC: NORMAL

## 2018-08-23 ENCOUNTER — OFFICE VISIT (OUTPATIENT)
Dept: FAMILY MEDICINE CLINIC | Facility: MEDICAL CENTER | Age: 83
End: 2018-08-23
Payer: MEDICARE

## 2018-08-23 VITALS
RESPIRATION RATE: 16 BRPM | HEART RATE: 68 BPM | DIASTOLIC BLOOD PRESSURE: 60 MMHG | BODY MASS INDEX: 14.12 KG/M2 | WEIGHT: 77.2 LBS | SYSTOLIC BLOOD PRESSURE: 160 MMHG

## 2018-08-23 DIAGNOSIS — I10 ESSENTIAL HYPERTENSION: ICD-10-CM

## 2018-08-23 DIAGNOSIS — L98.9 SKIN LESION: Primary | ICD-10-CM

## 2018-08-23 DIAGNOSIS — R68.89 LIP SYMPTOM: ICD-10-CM

## 2018-08-23 PROCEDURE — 99214 OFFICE O/P EST MOD 30 MIN: CPT | Performed by: FAMILY MEDICINE

## 2018-08-23 NOTE — PROGRESS NOTES
Sherrill Page is here for a lump on her abdomen  Recently noticed  Not painful  She feels like her lips are swollen  Doesn't see anything but lips feel like they are  Occurs with eating and drinking or smoking  Not itchy  Feels some numbness  Started about 2 months ago        /60 (Cuff Size: Large)   Pulse 68   Resp 16   Wt 35 kg (77 lb 3 2 oz)   BMI 14 12 kg/m²     BP by me 140/60  ENT pharynx normal lips normal   No swelling mouth no lesions  Neck no adenopathy thyromegaly  Chest diffusely decreased breath sounds  Cardiac regular rate rhythm  Abdomen BP sized lump subcutaneously lower abdomen    Assessment  No evidence for angioedema  Reassured benign lump in the abdomen    Plan  Recheck 6 months

## 2018-10-31 ENCOUNTER — APPOINTMENT (EMERGENCY)
Dept: CT IMAGING | Facility: HOSPITAL | Age: 83
End: 2018-10-31
Payer: MEDICARE

## 2018-10-31 ENCOUNTER — HOSPITAL ENCOUNTER (EMERGENCY)
Facility: HOSPITAL | Age: 83
Discharge: HOME | End: 2018-10-31
Admitting: EMERGENCY MEDICINE
Payer: MEDICARE

## 2018-10-31 VITALS
DIASTOLIC BLOOD PRESSURE: 76 MMHG | HEART RATE: 92 BPM | SYSTOLIC BLOOD PRESSURE: 182 MMHG | RESPIRATION RATE: 20 BRPM | WEIGHT: 81.35 LBS | OXYGEN SATURATION: 92 % | BODY MASS INDEX: 14.88 KG/M2 | TEMPERATURE: 98.5 F

## 2018-10-31 DIAGNOSIS — T14.8XXA MULTIPLE SKIN TEARS: ICD-10-CM
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
## 2018-10-31 LAB
ANION GAP SERPL CALCULATED.3IONS-SCNC: 9 MMOL/L (ref 4–13)
BACTERIA UR QL AUTO: ABNORMAL /HPF
BASOPHILS # BLD AUTO: 0.03 THOUSANDS/ΜL (ref 0–0.1)
BASOPHILS NFR BLD AUTO: 0 % (ref 0–1)
BILIRUB UR QL STRIP: NEGATIVE
BUN SERPL-MCNC: 18 MG/DL (ref 5–25)
CALCIUM SERPL-MCNC: 8.6 MG/DL (ref 8.3–10.1)
CHLORIDE SERPL-SCNC: 103 MMOL/L (ref 100–108)
CLARITY UR: CLEAR
CO2 SERPL-SCNC: 29 MMOL/L (ref 21–32)
COLOR UR: YELLOW
CREAT SERPL-MCNC: 0.92 MG/DL (ref 0.6–1.3)
EOSINOPHIL # BLD AUTO: 0.05 THOUSAND/ΜL (ref 0–0.61)
EOSINOPHIL NFR BLD AUTO: 1 % (ref 0–6)
ERYTHROCYTE [DISTWIDTH] IN BLOOD BY AUTOMATED COUNT: 15.1 % (ref 11.6–15.1)
GFR SERPL CREATININE-BSD FRML MDRD: 56 ML/MIN/1.73SQ M
GLUCOSE SERPL-MCNC: 87 MG/DL (ref 65–140)
GLUCOSE UR STRIP-MCNC: NEGATIVE MG/DL
HCT VFR BLD AUTO: 42.1 % (ref 34.8–46.1)
HGB BLD-MCNC: 13.5 G/DL (ref 11.5–15.4)
HGB UR QL STRIP.AUTO: ABNORMAL
IMM GRANULOCYTES # BLD AUTO: 0.03 THOUSAND/UL (ref 0–0.2)
IMM GRANULOCYTES NFR BLD AUTO: 0 % (ref 0–2)
KETONES UR STRIP-MCNC: NEGATIVE MG/DL
LEUKOCYTE ESTERASE UR QL STRIP: NEGATIVE
LYMPHOCYTES # BLD AUTO: 0.7 THOUSANDS/ΜL (ref 0.6–4.47)
LYMPHOCYTES NFR BLD AUTO: 8 % (ref 14–44)
MCH RBC QN AUTO: 32.2 PG (ref 26.8–34.3)
MCHC RBC AUTO-ENTMCNC: 32.1 G/DL (ref 31.4–37.4)
MCV RBC AUTO: 101 FL (ref 82–98)
MONOCYTES # BLD AUTO: 0.52 THOUSAND/ΜL (ref 0.17–1.22)
MONOCYTES NFR BLD AUTO: 6 % (ref 4–12)
NEUTROPHILS # BLD AUTO: 7.07 THOUSANDS/ΜL (ref 1.85–7.62)
NEUTS SEG NFR BLD AUTO: 85 % (ref 43–75)
NITRITE UR QL STRIP: NEGATIVE
NON-SQ EPI CELLS URNS QL MICRO: ABNORMAL /HPF
NRBC BLD AUTO-RTO: 0 /100 WBCS
PH UR STRIP.AUTO: 6 [PH] (ref 4.5–8)
PLATELET # BLD AUTO: 252 THOUSANDS/UL (ref 149–390)
PMV BLD AUTO: 9.7 FL (ref 8.9–12.7)
POTASSIUM SERPL-SCNC: 3.8 MMOL/L (ref 3.5–5.3)
PROT UR STRIP-MCNC: NEGATIVE MG/DL
RBC # BLD AUTO: 4.19 MILLION/UL (ref 3.81–5.12)
RBC #/AREA URNS AUTO: ABNORMAL /HPF
SODIUM SERPL-SCNC: 141 MMOL/L (ref 136–145)
SP GR UR STRIP.AUTO: 1.02 (ref 1–1.03)
TROPONIN I SERPL-MCNC: <0.02 NG/ML
UROBILINOGEN UR QL STRIP.AUTO: 0.2 E.U./DL
WBC # BLD AUTO: 8.4 THOUSAND/UL (ref 4.31–10.16)
WBC #/AREA URNS AUTO: ABNORMAL /HPF

## 2018-10-31 PROCEDURE — 84484 ASSAY OF TROPONIN QUANT: CPT | Performed by: PHYSICIAN ASSISTANT

## 2018-10-31 PROCEDURE — 93005 ELECTROCARDIOGRAM TRACING: CPT

## 2018-10-31 PROCEDURE — 36415 COLL VENOUS BLD VENIPUNCTURE: CPT | Performed by: PHYSICIAN ASSISTANT

## 2018-10-31 PROCEDURE — 81001 URINALYSIS AUTO W/SCOPE: CPT | Performed by: PHYSICIAN ASSISTANT

## 2018-10-31 PROCEDURE — 70450 CT HEAD/BRAIN W/O DYE: CPT

## 2018-10-31 PROCEDURE — 70486 CT MAXILLOFACIAL W/O DYE: CPT

## 2018-10-31 PROCEDURE — 85025 COMPLETE CBC W/AUTO DIFF WBC: CPT | Performed by: PHYSICIAN ASSISTANT

## 2018-10-31 PROCEDURE — 74177 CT ABD & PELVIS W/CONTRAST: CPT

## 2018-10-31 PROCEDURE — 71260 CT THORAX DX C+: CPT

## 2018-10-31 PROCEDURE — 72125 CT NECK SPINE W/O DYE: CPT

## 2018-10-31 PROCEDURE — 90471 IMMUNIZATION ADMIN: CPT

## 2018-10-31 PROCEDURE — 80048 BASIC METABOLIC PNL TOTAL CA: CPT | Performed by: PHYSICIAN ASSISTANT

## 2018-10-31 PROCEDURE — 90715 TDAP VACCINE 7 YRS/> IM: CPT | Performed by: PHYSICIAN ASSISTANT

## 2018-10-31 PROCEDURE — 99284 EMERGENCY DEPT VISIT MOD MDM: CPT

## 2018-10-31 RX ORDER — LIDOCAINE HYDROCHLORIDE AND EPINEPHRINE 10; 10 MG/ML; UG/ML
5 INJECTION, SOLUTION INFILTRATION; PERINEURAL ONCE
Status: COMPLETED | OUTPATIENT
Start: 2018-10-31 | End: 2018-10-31

## 2018-10-31 RX ADMIN — TETANUS TOXOID, REDUCED DIPHTHERIA TOXOID AND ACELLULAR PERTUSSIS VACCINE, ADSORBED 0.5 ML: 5; 2.5; 8; 8; 2.5 SUSPENSION INTRAMUSCULAR at 11:19

## 2018-10-31 RX ADMIN — IOHEXOL 80 ML: 350 INJECTION, SOLUTION INTRAVENOUS at 12:09

## 2018-10-31 RX ADMIN — LIDOCAINE HYDROCHLORIDE AND EPINEPHRINE 5 ML: 10; 10 INJECTION, SOLUTION INFILTRATION; PERINEURAL at 12:18

## 2018-10-31 NOTE — ED PROVIDER NOTES
History  Chief Complaint   Patient presents with   [place] Fall     pt tripped and fell today while getting out of the car  hit her head  denies any LOC  Elias Uriostegui is a 80 y o  female w PMH HTN, ovarian cyst, dementia who presents for evaluation of fall  Pt is on asa but tells me she has not been taking it  She tells me she fell today while getting out of car  Thinks she tripped over her feet but isn't sure  Denies known LOC  She fell down to her knee and then fell forward and hit her head against the CT on  He she has some mild pain of the left knee  Denies headache or neck pain but reports she feels off               Prior to Admission Medications   Prescriptions Last Dose Informant Patient Reported? Taking?   acetaminophen (TYLENOL) 650 mg CR tablet  Self Yes Yes   Sig: Take 650 mg by mouth every 8 (eight) hours as needed for mild pain   amLODIPine (NORVASC) 5 mg tablet  Self Yes Yes   Sig: Take 5 mg by mouth daily   aspirin (ASPIRIN ADULT LOW DOSE) 81 mg EC tablet  Self Yes Yes   Sig: Take by mouth   donepezil (ARICEPT) 10 mg tablet  Self Yes Yes   Sig: Take 10 mg by mouth daily   sertraline (ZOLOFT) 25 mg tablet  Self Yes Yes   Sig: Take 25 mg by mouth daily        Facility-Administered Medications: None       Past Medical History:   Diagnosis Date    Dementia     Hypertension     Ovarian cyst        Past Surgical History:   Procedure Laterality Date    APPENDECTOMY      CATARACT EXTRACTION      COLONOSCOPY      COLONOSCOPY N/A 10/5/2017    Procedure: COLONOSCOPY;  Surgeon: Shady Evans MD;  Location: [facility]; Service: Gastroenterology    HYSTERECTOMY      OVARIAN CYST REMOVAL         Family History   Problem Relation Age of Onset    No Known Problems Mother     Hypertension Father      I have reviewed and agree with the history as documented      Social History   Substance Use Topics    Smoking status: Current Every Day Smoker     Packs/day: 1 00     Types: Cigarettes    Smokeless tobacco: Never Used    Alcohol use No        Review of Systems   Constitutional: Negative for chills, diaphoresis and fever  HENT: Negative for congestion and sore throat  Eyes: Negative for visual disturbance  Respiratory: Negative for cough, chest tightness, shortness of breath and wheezing  Cardiovascular: Negative for chest pain and leg swelling  Gastrointestinal: Negative for abdominal pain, constipation, diarrhea, nausea and vomiting  Genitourinary: Negative for difficulty urinating, dysuria, frequency, hematuria, urgency, vaginal bleeding, vaginal discharge and vaginal pain  Musculoskeletal: Positive for arthralgias  Negative for myalgias  Skin: Positive for wound  Neurological: Negative for dizziness, weakness, light-headedness, numbness and headaches  Psychiatric/Behavioral: The patient is not nervous/anxious  Physical Exam  Physical Exam   Constitutional: She is oriented to person, place, and time  She appears well-developed and well-nourished  No distress  HENT:   Head: Normocephalic and atraumatic  Small hematoma periorbital L eye laterally  Normal eoms  No facial bone instability  No long / racoon sx   No hemotympanum, otorrhea or rhinorrhea   Dentition intact    Eyes: Pupils are equal, round, and reactive to light  Conjunctivae and EOM are normal    Atraumatic orbits   Neck: Neck supple  No tracheal deviation present  c spine w/o midline stepoff or deformity or ttp   Cardiovascular: Normal rate, regular rhythm, normal heart sounds and intact distal pulses  Exam reveals no gallop and no friction rub  No murmur heard  Pulmonary/Chest: Effort normal and breath sounds normal  No respiratory distress  She has no wheezes  She has no rales  She exhibits no tenderness  BS equal and cta b/l    Abdominal: Soft  Bowel sounds are normal  She exhibits no distension and no mass  There is no tenderness  There is no rebound and no guarding     Musculoskeletal: Normal range of motion  She exhibits no edema, tenderness or deformity  Pelvis stable   No midline spinal stepoff or deformity or ttp   Neurological: She is alert and oriented to person, place, and time  GCS 15    Skin: Skin is warm and dry  She is not diaphoretic  Mult skin tears R knee, both hands and fingers    Psychiatric: She has a normal mood and affect  Her behavior is normal    Nursing note and vitals reviewed        Vital Signs  ED Triage Vitals [10/31/18 1035]   Temperature Pulse Respirations Blood Pressure SpO2   98 5 °F (36 9 °C) 85 16 (!) 171/75 93 %      Temp Source Heart Rate Source Patient Position - Orthostatic VS BP Location FiO2 (%)   Oral Monitor Sitting Right arm --      Pain Score       1           Vitals:    10/31/18 1035 10/31/18 1218 10/31/18 1309   BP: (!) 171/75 (!) 206/83 (!) 182/76   Pulse: 85 92    Patient Position - Orthostatic VS: Sitting Lying Lying       Visual Acuity      ED Medications  Medications   tetanus-diphtheria-acellular pertussis (BOOSTRIX) IM injection 0 5 mL (0 5 mL Intramuscular Given 10/31/18 1119)   lidocaine-epinephrine (XYLOCAINE/EPINEPHRINE) 1 %-1:100,000 injection 5 mL (5 mL Infiltration Given 10/31/18 1218)   iohexol (OMNIPAQUE) 350 MG/ML injection (MULTI-DOSE) 80 mL (80 mL Intravenous Given 10/31/18 1209)       Diagnostic Studies  Results Reviewed     Procedure Component Value Units Date/Time    Urine Microscopic [[id number]]  (Abnormal) Collected:  10/31/18 1120    Lab Status:  Final result Specimen:  Urine from Urine, Clean Catch Updated:  10/31/18 1139     RBC, UA 1-2 (A) /hpf      WBC, UA 0-1 (A) /hpf      Epithelial Cells Occasional /hpf      Bacteria, UA None Seen /hpf     Troponin I [[id number]]  (Normal) Collected:  10/31/18 1105    Lab Status:  Final result Specimen:  Blood Updated:  10/31/18 1135     Troponin I <0 02 ng/mL     UA w Reflex to Microscopic w Reflex to Culture [[id number]]  (Abnormal) Collected:  10/31/18 1120    Lab Status:  Final result Specimen: Urine from Urine, Clean Catch Updated:  10/31/18 1128     Color, UA Yellow     Clarity, UA Clear     Specific [place], UA 1 020     pH, UA 6 0     Leukocytes, UA Negative     Nitrite, UA Negative     Protein, UA Negative mg/dl      Glucose, UA Negative mg/dl      Ketones, UA Negative mg/dl      Urobilinogen, UA 0 2 E U /dl      Bilirubin, UA Negative     Blood, UA Small (A)    Basic metabolic panel [[id number]] Collected:  10/31/18 1105    Lab Status:  Final result Specimen:  Blood Updated:  10/31/18 1126     Sodium 141 mmol/L      Potassium 3 8 mmol/L      Chloride 103 mmol/L      CO2 29 mmol/L      ANION GAP 9 mmol/L      BUN 18 mg/dL      Creatinine 0 92 mg/dL      Glucose 87 mg/dL      Calcium 8 6 mg/dL      eGFR 56 ml/min/1 73sq m     Narrative:         National Kidney Disease Education Program recommendations are as follows:  GFR calculation is accurate only with a steady state creatinine  Chronic Kidney disease less than 60 ml/min/1 73 sq  meters  Kidney failure less than 15 ml/min/1 73 sq  meters  CBC and differential [[id number]]  (Abnormal) Collected:  10/31/18 1105    Lab Status:  Final result Specimen:  Blood Updated:  10/31/18 1111     WBC 8 40 Thousand/uL      RBC 4 19 Million/uL      Hemoglobin 13 5 g/dL      Hematocrit 42 1 %       (H) fL      MCH 32 2 pg      MCHC 32 1 g/dL      RDW 15 1 %      MPV 9 7 fL      Platelets 413 Thousands/uL      nRBC 0 /100 WBCs      Neutrophils Relative 85 (H) %      Immat GRANS % 0 %      Lymphocytes Relative 8 (L) %      Monocytes Relative 6 %      Eosinophils Relative 1 %      Basophils Relative 0 %      Neutrophils Absolute 7 07 Thousands/µL      Immature Grans Absolute 0 03 Thousand/uL      Lymphocytes Absolute 0 70 Thousands/µL      Monocytes Absolute 0 52 Thousand/µL      Eosinophils Absolute 0 05 Thousand/µL      Basophils Absolute 0 03 Thousands/µL                  CT chest abdomen pelvis w contrast   Final Result by Contreras Ware MD (10/31 2575)   1  Persistent right greater than left biapical pleural parenchymal scarring and scattered reticulonodular changes as well as bronchiectasis consistent with sequelae of previous infectious or inflammatory process  2   Left basilar 6 mm pulmonary nodule appears stable for the previous 4 months  Based on current Fleischner Society 2017 Guidelines on incidental pulmonary nodule, an additional followup is recommended for 18-24 months from the initial examination  3   No evidence of acute posttraumatic injury throughout the chest, abdomen and pelvis  Workstation performed: [id number]         CT facial bones wo contrast   Final Result by Juan Antonio Smith MD (10/31 1157)      No acute facial abnormality               Workstation performed: [id number]         CT spine cervical without contrast   Final Result by Juan Antonio Smith MD (10/31 1123)      No cervical spine fracture or traumatic malalignment  Workstation performed: [id number]         CT head without contrast   Final Result by Catrachita Dillard MD (10/31 1115)      No acute intracranial process  No skull fracture  Chronic microangiopathy  Workstation performed: [id number]                    Procedures  Procedures       Phone Contacts  ED Phone Contact    ED Course  ED Course as of Nov 01 1013   Wed Oct 31, 2018   1042 EKG Interpretation    Rate: 70 BPM  Rhythm: NSR w sinus arrythmia   Axis: LAD  Intervals: Normal, no blocks, QTc 436ms  Q waves: no  T waves: upright   ST segments: no depression / elevation    Impression: normal EKG  EKG for comparison: T waves now upright in V2 / V3 compared to EKG 10/2/17  EKG interpreted by me  [id number] Daughter now present and complained that her mother is demented and that she hasnt had any xrays of her head, knees or her hands  I explained she had CTH and face which were neg   Explained she did not get XR of her knees as, despite her mild dementia, she is able to tell me where she has pain and denies any other than overlying the area of skin tears  She also has 5/5 strength and normal sensation in all extremities  When you examine her knees she kicks them in the air and flexes and extends them w her knees flexed exhibiting full normal rom  She has also been ambulating around the ED w RN Geraldine which I explained would make fx unlikely  Did offer we can obtain knee XR and hand XR  Daughter then decided she agrees and doesn't feel pt needs them at this time  MDM  Number of Diagnoses or Management Options  Fall, initial encounter:   Multiple skin tears:   Diagnosis management comments: DDX includes but not ltd to: Fall on asa - seems mechanical but not 100% sure, family not present and pt w mild dementia  Consider TBI / facial bone fx and / or C spine trauma although completely normal stenrgth / sensation and neuro exam     Plan is to obtain:  [facility] for ICH / TBI, CT C spine for acute osseous abnormality, CT c/a/p for acute osseous abnormality, solid organ injury, free fluid, extravasation of contrast   CBC to check for anemia, leukocytosis, hydration status  Chemistry panel to check for lyte abnormalities, organ function   EKG/trop to check for ischemic changes     Based on results:  No acute abn on imaging to suggest traumatic injury  Discussed w family who agree this was mechanical and she tripped  They are comforttable taking her home - she lives w daughter  I gave small 1 cc injection lido w epi to tip of the L ring finger for continue oozing and applied compressive dressing here  Return parameters discussed  Pt requires f/u as an outpt  Pt expresses understanding w above treatment plan  All questions answered prior to d/c        CritCare Time    Disposition  Final diagnoses:   Fall, initial encounter   Multiple skin tears     Time reflects when diagnosis was documented in both MDM as applicable and the Disposition within this note     Time User Action Codes Description Comment    10/31/2018  1:09 PM Susana Verdugo Add [R60  GWIO] Fall, initial encounter     10/31/2018  1:09 PM Hoa Leos  8XXA] Multiple skin tears       ED Disposition     ED Disposition Condition Comment    Discharge  Linda Carrillo discharge to home/self care  Condition at discharge: Good        Follow-up Information     Follow up With Specialties Details Why Contact Info Additional Information    [address] [facility] Emergency Medicine  If symptoms worsen [address] [facility] [place] [address]  [phone] [facility], [address], [place], Braulio Bennett MD Family Medicine  As needed [place] [address]  [address] [facility] [address]  [phone]             Discharge Medication List as of 10/31/2018  1:12 PM      CONTINUE these medications which have NOT CHANGED    Details   acetaminophen (TYLENOL) 650 mg CR tablet Take 650 mg by mouth every 8 (eight) hours as needed for mild pain, Historical Med      amLODIPine (NORVASC) 5 mg tablet Take 5 mg by mouth daily, Historical Med      aspirin (ASPIRIN ADULT LOW DOSE) 81 mg EC tablet Take by mouth, Historical Med      donepezil (ARICEPT) 10 mg tablet Take 10 mg by mouth daily, Historical Med      sertraline (ZOLOFT) 25 mg tablet Take 25 mg by mouth daily  , Historical Med           No discharge procedures on file      ED Provider  Electronically Signed by           Sowmya Deleon PA-C  11/01/18 2715

## 2018-10-31 NOTE — DISCHARGE INSTRUCTIONS
Fall Prevention   WHAT YOU NEED TO KNOW:   Fall prevention includes ways to make your home and other areas safer  It also includes ways you can move more carefully to prevent a fall  Health conditions that cause changes in your blood pressure, vision, or muscle strength and coordination may increase your risk for falls  Medicines may also increase your risk for falls if they make you dizzy, weak, or sleepy  DISCHARGE INSTRUCTIONS:   Call 911 or have someone else call if:   · You have fallen and are unconscious  · You have fallen and cannot move part of your body  Contact your healthcare provider if:   · You have fallen and have pain or a headache  · You have questions or concerns about your condition or care  Fall prevention tips:   · Stand or sit up slowly  This may help you keep your balance and prevent falls  · Use assistive devices as directed  Your healthcare provider may suggest that you use a cane or walker to help you keep your balance  You may need to have grab bars put in your bathroom near the toilet or in the shower  · Wear shoes that fit well and have soles that   Wear shoes both inside and outside  Use slippers with good   Do not wear shoes with high heels  · Wear a personal alarm  This is a device that allows you to call 911 if you fall and need help  Ask your healthcare provider for more information  · Stay active  Exercise can help strengthen your muscles and improve your balance  Your healthcare provider may recommend water aerobics or walking  He or she may also recommend physical therapy to improve your coordination  Never start an exercise program without talking to your healthcare provider first      · Manage your medical conditions  Keep all appointments with your healthcare providers  Visit your eye doctor as directed  Home safety tips:   · Add items to prevent falls in the bathroom    Put nonslip strips on your bath or shower floor to prevent you from slipping  Use a bath mat if you do not have carpet in the bathroom  This will prevent you from falling when you step out of the bath or shower  Use a shower seat so you do not need to stand while you shower  Sit on the toilet or a chair in your bathroom to dry yourself and put on clothing  This will prevent you from losing your balance from drying or dressing yourself while you are standing  · Keep paths clear  Remove books, shoes, and other objects from walkways and stairs  Place cords for telephones and lamps out of the way so that you do not need to walk over them  Tape them down if you cannot move them  Remove small rugs  If you cannot remove a rug, secure it with double-sided tape  This will prevent you from tripping  · Install bright lights in your home  Use night lights to help light paths to the bathroom or kitchen  Always turn on the light before you start walking  · Keep items you use often on shelves within reach  Do not use a step stool to help you reach an item  · Paint or place reflective tape on the edges of your stairs  This will help you see the stairs better  Follow up with your healthcare provider as directed:  Write down your questions so you remember to ask them during your visits  © 2017 [employer] Information is for End User's use only and may not be sold, redistributed or otherwise used for commercial purposes  All illustrations and images included in CareNotes® are the copyrighted property of A D A M , Inc  or Daljit Arvizu  The above information is an  only  It is not intended as medical advice for individual conditions or treatments  Talk to your doctor, nurse or pharmacist before following any medical regimen to see if it is safe and effective for you

## 2018-10-31 NOTE — ED NOTES
Pt has abrasion & bruising about (L) eyebrow, small laceration with bruising to (L) ringer finger & pt has skin tear on (L) knee       Conor Urban RN  10/31/18 0947

## 2018-11-01 LAB
ATRIAL RATE: 70 BPM
P AXIS: 70 DEGREES
PR INTERVAL: 154 MS
QRS AXIS: -23 DEGREES
QRSD INTERVAL: 80 MS
QT INTERVAL: 404 MS
QTC INTERVAL: 436 MS
T WAVE AXIS: 69 DEGREES
VENTRICULAR RATE: 70 BPM

## 2018-11-01 PROCEDURE — 93010 ELECTROCARDIOGRAM REPORT: CPT | Performed by: INTERNAL MEDICINE

## 2018-12-04 NOTE — ASSESSMENT & PLAN NOTE
Currently patient is alert, oriented x3      Encourage re- orientation, delirium precautions HPI:  
Yogesh King is a 27 y.o. female who presents to [facility]. Depression She is an 27 y.o. female seen for new evaluation and treatment of of depression. She complains of depressed mood. Onset was approximately several years ago, gradually worsening since that time. She denies current suicidal and homicidal plan or intent. Family history significant for depression. Possible organic causes contributing are: none. Risk factors: previous episode of depression. Previous drug treatment includes Prozac; other therapy: no other therapies. She complains of the following side effects from the treatment: none She reports a history of hypertension. She is not monitoring home pressures. Reports a history of headaches. Reports her heaviest lifetime weight is 406. Has attempted cleanse diets, self-directed dieting, and [employer] all with some minimal improvement in her weight. Reports eating out is a difficulty and making good decisions. Current Outpatient Medications Medication Sig Dispense Refill  SPRINTEC, 28, 0.25-35 mg-mcg tab 1 Tab daily.  citalopram (CELEXA) 10 mg tablet Take 1 Tab by mouth daily. 30 Tab 3  
 amLODIPine (NORVASC) 5 mg tablet Take 1 Tab by mouth daily. 30 Tab 3 Allergies Allergen Reactions  Lisinopril Swelling \"lip swelling\"  Sulfa (Sulfonamide Antibiotics) Swelling Patient Active Problem List  
Diagnosis Code  Obesity, morbid ([facility] [address] E66.01  
 Recurrent depression ([facility] [address] F33.9 Past Medical History:  
Diagnosis Date  Anemia  Depression  Hypertension History reviewed. No pertinent surgical history. Patient's last menstrual period was 11/29/2018 (exact date). Family History Problem Relation Age of Onset  Hypertension Mother  Depression Mother  No Known Problems Father  Hypertension Maternal Grandmother  Cancer Maternal Grandmother   
     breast and lung  Cancer Maternal Grandfather lung cancer- smoker Social History Socioeconomic History  Marital status: SINGLE Spouse name: Not on file  Number of children: Not on file  Years of education: Not on file  Highest education level: Not on file Social Needs  Financial resource strain: Not on file  Food insecurity - worry: Not on file  Food insecurity - inability: Not on file  Transportation needs - medical: Not on file  Transportation needs - non-medical: Not on file Occupational History  Not on file Tobacco Use  Smoking status: Never Smoker  Smokeless tobacco: Never Used Substance and Sexual Activity  Alcohol use: Yes Frequency: Monthly or less Drinks per session: 1 or 2 Binge frequency: Never Comment: wine, fruity drinks  Drug use: No  
 Sexual activity: No  
Other Topics Concern  Not on file Social History Narrative  Not on file ROS:  
Review of Systems Constitutional: Negative for fever, malaise/fatigue and weight loss. HENT: Negative for hearing loss. Eyes: Negative for blurred vision and pain. Respiratory: Negative for cough and shortness of breath. Cardiovascular: Negative for chest pain, palpitations and leg swelling. Gastrointestinal: Negative for abdominal pain, blood in stool, constipation, diarrhea and melena. Genitourinary: Negative for dysuria and hematuria. Musculoskeletal: Negative for joint pain. Skin: Negative for rash. Neurological: Positive for headaches. Psychiatric/Behavioral: Positive for depression. The patient is nervous/anxious and has insomnia. Physical Exam:  
 
Visit Vitals BP (!) 174/120 Pulse 75 Temp 99.1 °F (37.3 °C) (Oral) Resp 16 Ht 5' 6\" (1.676 m) Wt (!) 385 lb 12.8 oz (175 kg) LMP 11/29/2018 (Exact Date) SpO2 98% BMI 62.27 kg/m² Vitals and Nurse Documentation reviewed. Physical Exam  
Constitutional: No distress. Cardiovascular: S1 normal and S2 normal. Exam reveals no gallop and no friction rub. No murmur heard. Pulmonary/Chest: Breath sounds normal. No respiratory distress. Skin: Skin is warm and dry. Psychiatric: Affect normal. She exhibits a depressed mood. Tearful at times. Assessment/ Plan:  
[employer] were discussed including hours of operation, on-call providers, and MyChart. Diagnoses and all orders for this visit: 1. Essential hypertension 
-     amLODIPine (NORVASC) 5 mg tablet; Take 1 Tab by mouth daily. 
-     MICROALBUMIN, UR, RAND W/ MICROALB/CREAT RATIO Uncontrolled. Start amlodipine as above. Follow-up in 1 month. She is unable to take home blood pressures due to morbid obesity. ER if symptoms worsen. 2. Screening for diabetes mellitus -     AMB POC HEMOGLOBIN A1C is normal. 
 
3. Recurrent depression (HCC) 
-     citalopram (CELEXA) 10 mg tablet; Take 1 Tab by mouth daily. Uncontrolled. Start Celexa as above. Did discuss counseling as well when available in house she will return in 1 month or sooner as needed. Follow-up Disposition: 
Return in about 4 weeks (around 1/1/2019) for Follow Up.

## 2019-05-06 ENCOUNTER — HOSPITAL ENCOUNTER (EMERGENCY)
Facility: HOSPITAL | Age: 84
Discharge: HOME | End: 2019-05-06
Attending: EMERGENCY MEDICINE | Admitting: EMERGENCY MEDICINE
Payer: MEDICARE

## 2019-05-06 ENCOUNTER — APPOINTMENT (EMERGENCY)
Dept: ULTRASOUND IMAGING | Facility: HOSPITAL | Age: 84
End: 2019-05-06
Payer: MEDICARE

## 2019-05-06 ENCOUNTER — APPOINTMENT (EMERGENCY)
Dept: RADIOLOGY | Facility: HOSPITAL | Age: 84
End: 2019-05-06
Payer: MEDICARE

## 2019-05-06 VITALS
BODY MASS INDEX: 15.17 KG/M2 | TEMPERATURE: 97.9 F | WEIGHT: 82.45 LBS | SYSTOLIC BLOOD PRESSURE: 183 MMHG | DIASTOLIC BLOOD PRESSURE: 73 MMHG | RESPIRATION RATE: 18 BRPM | HEART RATE: 66 BPM | HEIGHT: 62 IN | OXYGEN SATURATION: 95 %

## 2019-05-06 DIAGNOSIS — R60.9 PERIPHERAL EDEMA: Primary | ICD-10-CM

## 2019-05-06 DIAGNOSIS — L03.115 CELLULITIS OF RIGHT FOOT: ICD-10-CM

## 2019-05-06 LAB
ANION GAP SERPL CALCULATED.3IONS-SCNC: 7 MMOL/L (ref 4–13)
ATRIAL RATE: 72 BPM
BASOPHILS # BLD AUTO: 0.03 THOUSANDS/ΜL (ref 0–0.1)
BASOPHILS NFR BLD AUTO: 0 % (ref 0–1)
BILIRUB UR QL STRIP: NEGATIVE
BUN SERPL-MCNC: 14 MG/DL (ref 5–25)
CALCIUM SERPL-MCNC: 8.5 MG/DL (ref 8.3–10.1)
CHLORIDE SERPL-SCNC: 106 MMOL/L (ref 100–108)
CLARITY UR: CLEAR
CO2 SERPL-SCNC: 30 MMOL/L (ref 21–32)
COLOR UR: YELLOW
CREAT SERPL-MCNC: 0.79 MG/DL (ref 0.6–1.3)
EOSINOPHIL # BLD AUTO: 0.16 THOUSAND/ΜL (ref 0–0.61)
EOSINOPHIL NFR BLD AUTO: 2 % (ref 0–6)
ERYTHROCYTE [DISTWIDTH] IN BLOOD BY AUTOMATED COUNT: 15.6 % (ref 11.6–15.1)
GFR SERPL CREATININE-BSD FRML MDRD: 67 ML/MIN/1.73SQ M
GLUCOSE SERPL-MCNC: 81 MG/DL (ref 65–140)
GLUCOSE UR STRIP-MCNC: NEGATIVE MG/DL
HCT VFR BLD AUTO: 39.5 % (ref 34.8–46.1)
HGB BLD-MCNC: 12.5 G/DL (ref 11.5–15.4)
HGB UR QL STRIP.AUTO: NEGATIVE
IMM GRANULOCYTES # BLD AUTO: 0.04 THOUSAND/UL (ref 0–0.2)
IMM GRANULOCYTES NFR BLD AUTO: 1 % (ref 0–2)
KETONES UR STRIP-MCNC: NEGATIVE MG/DL
LACTATE SERPL-SCNC: 0.9 MMOL/L (ref 0.5–2)
LEUKOCYTE ESTERASE UR QL STRIP: NEGATIVE
LYMPHOCYTES # BLD AUTO: 1.16 THOUSANDS/ΜL (ref 0.6–4.47)
LYMPHOCYTES NFR BLD AUTO: 13 % (ref 14–44)
MAGNESIUM SERPL-MCNC: 2 MG/DL (ref 1.6–2.6)
MCH RBC QN AUTO: 31.9 PG (ref 26.8–34.3)
MCHC RBC AUTO-ENTMCNC: 31.6 G/DL (ref 31.4–37.4)
MCV RBC AUTO: 101 FL (ref 82–98)
MONOCYTES # BLD AUTO: 0.56 THOUSAND/ΜL (ref 0.17–1.22)
MONOCYTES NFR BLD AUTO: 6 % (ref 4–12)
NEUTROPHILS # BLD AUTO: 6.89 THOUSANDS/ΜL (ref 1.85–7.62)
NEUTS SEG NFR BLD AUTO: 78 % (ref 43–75)
NITRITE UR QL STRIP: NEGATIVE
NRBC BLD AUTO-RTO: 0 /100 WBCS
NT-PROBNP SERPL-MCNC: 329 PG/ML
P AXIS: 83 DEGREES
PH UR STRIP.AUTO: 7.5 [PH]
PLATELET # BLD AUTO: 320 THOUSANDS/UL (ref 149–390)
PMV BLD AUTO: 9.9 FL (ref 8.9–12.7)
POTASSIUM SERPL-SCNC: 4.3 MMOL/L (ref 3.5–5.3)
PR INTERVAL: 140 MS
PROCALCITONIN SERPL-MCNC: 0.05 NG/ML
PROT UR STRIP-MCNC: NEGATIVE MG/DL
QRS AXIS: -16 DEGREES
QRSD INTERVAL: 84 MS
QT INTERVAL: 396 MS
QTC INTERVAL: 433 MS
RBC # BLD AUTO: 3.92 MILLION/UL (ref 3.81–5.12)
SODIUM SERPL-SCNC: 143 MMOL/L (ref 136–145)
SP GR UR STRIP.AUTO: 1.01 (ref 1–1.03)
T WAVE AXIS: 85 DEGREES
TROPONIN I SERPL-MCNC: <0.02 NG/ML
UROBILINOGEN UR QL STRIP.AUTO: 0.2 E.U./DL
VENTRICULAR RATE: 72 BPM
WBC # BLD AUTO: 8.84 THOUSAND/UL (ref 4.31–10.16)

## 2019-05-06 PROCEDURE — 96360 HYDRATION IV INFUSION INIT: CPT

## 2019-05-06 PROCEDURE — 93971 EXTREMITY STUDY: CPT | Performed by: SURGERY

## 2019-05-06 PROCEDURE — 83880 ASSAY OF NATRIURETIC PEPTIDE: CPT | Performed by: EMERGENCY MEDICINE

## 2019-05-06 PROCEDURE — 93005 ELECTROCARDIOGRAM TRACING: CPT

## 2019-05-06 PROCEDURE — 87040 BLOOD CULTURE FOR BACTERIA: CPT | Performed by: EMERGENCY MEDICINE

## 2019-05-06 PROCEDURE — 99284 EMERGENCY DEPT VISIT MOD MDM: CPT

## 2019-05-06 PROCEDURE — 84145 PROCALCITONIN (PCT): CPT | Performed by: EMERGENCY MEDICINE

## 2019-05-06 PROCEDURE — 99284 EMERGENCY DEPT VISIT MOD MDM: CPT | Performed by: EMERGENCY MEDICINE

## 2019-05-06 PROCEDURE — 83735 ASSAY OF MAGNESIUM: CPT | Performed by: EMERGENCY MEDICINE

## 2019-05-06 PROCEDURE — 83605 ASSAY OF LACTIC ACID: CPT | Performed by: EMERGENCY MEDICINE

## 2019-05-06 PROCEDURE — 80048 BASIC METABOLIC PNL TOTAL CA: CPT | Performed by: EMERGENCY MEDICINE

## 2019-05-06 PROCEDURE — 84484 ASSAY OF TROPONIN QUANT: CPT | Performed by: EMERGENCY MEDICINE

## 2019-05-06 PROCEDURE — 36415 COLL VENOUS BLD VENIPUNCTURE: CPT | Performed by: EMERGENCY MEDICINE

## 2019-05-06 PROCEDURE — 81003 URINALYSIS AUTO W/O SCOPE: CPT | Performed by: EMERGENCY MEDICINE

## 2019-05-06 PROCEDURE — 93010 ELECTROCARDIOGRAM REPORT: CPT | Performed by: INTERNAL MEDICINE

## 2019-05-06 PROCEDURE — 71046 X-RAY EXAM CHEST 2 VIEWS: CPT

## 2019-05-06 PROCEDURE — 93971 EXTREMITY STUDY: CPT

## 2019-05-06 PROCEDURE — 85025 COMPLETE CBC W/AUTO DIFF WBC: CPT | Performed by: EMERGENCY MEDICINE

## 2019-05-06 RX ORDER — CLINDAMYCIN HYDROCHLORIDE 300 MG/1
300 CAPSULE ORAL 3 TIMES DAILY
Qty: 21 CAPSULE | Refills: 0 | Status: SHIPPED | OUTPATIENT
Start: 2019-05-06 | End: 2019-05-13

## 2019-05-06 RX ORDER — CLINDAMYCIN HYDROCHLORIDE 150 MG/1
300 CAPSULE ORAL ONCE
Status: COMPLETED | OUTPATIENT
Start: 2019-05-06 | End: 2019-05-06

## 2019-05-06 RX ADMIN — SODIUM CHLORIDE 500 ML: 0.9 INJECTION, SOLUTION INTRAVENOUS at 10:25

## 2019-05-06 RX ADMIN — CLINDAMYCIN HYDROCHLORIDE 300 MG: 150 CAPSULE ORAL at 12:37

## 2019-05-11 LAB
BACTERIA BLD CULT: NORMAL
BACTERIA BLD CULT: NORMAL

## 2019-06-20 ENCOUNTER — OFFICE VISIT (OUTPATIENT)
Dept: FAMILY MEDICINE CLINIC | Facility: MEDICAL CENTER | Age: 84
End: 2019-06-20
Payer: MEDICARE

## 2019-06-20 ENCOUNTER — APPOINTMENT (OUTPATIENT)
Dept: RADIOLOGY | Facility: MEDICAL CENTER | Age: 84
End: 2019-06-20
Payer: MEDICARE

## 2019-06-20 VITALS
SYSTOLIC BLOOD PRESSURE: 128 MMHG | BODY MASS INDEX: 13.86 KG/M2 | DIASTOLIC BLOOD PRESSURE: 68 MMHG | HEART RATE: 106 BPM | WEIGHT: 75.8 LBS | RESPIRATION RATE: 18 BRPM | OXYGEN SATURATION: 93 %

## 2019-06-20 DIAGNOSIS — R63.4 WEIGHT LOSS: ICD-10-CM

## 2019-06-20 DIAGNOSIS — R79.81 LOW O2 SATURATION: ICD-10-CM

## 2019-06-20 DIAGNOSIS — R19.00 ABDOMINAL MASS, UNSPECIFIED ABDOMINAL LOCATION: ICD-10-CM

## 2019-06-20 DIAGNOSIS — R06.89 DECREASED BREATH SOUNDS: ICD-10-CM

## 2019-06-20 DIAGNOSIS — R60.0 BILATERAL LEG EDEMA: ICD-10-CM

## 2019-06-20 DIAGNOSIS — R06.02 SOB (SHORTNESS OF BREATH): ICD-10-CM

## 2019-06-20 DIAGNOSIS — R06.02 SOB (SHORTNESS OF BREATH): Primary | ICD-10-CM

## 2019-06-20 DIAGNOSIS — R64 CACHECTIC (HCC): ICD-10-CM

## 2019-06-20 PROBLEM — H91.90 HEARING LOSS: Status: ACTIVE | Noted: 2017-02-22

## 2019-06-20 PROBLEM — F32.A DEPRESSION: Status: ACTIVE | Noted: 2017-02-27

## 2019-06-20 PROBLEM — R19.4 CHANGE IN BOWEL HABITS: Status: ACTIVE | Noted: 2017-10-18

## 2019-06-20 PROBLEM — M17.10 OSTEOARTHRITIS OF KNEE: Status: ACTIVE | Noted: 2017-02-22

## 2019-06-20 PROBLEM — E55.9 VITAMIN D DEFICIENCY: Status: ACTIVE | Noted: 2017-02-22

## 2019-06-20 PROCEDURE — 99213 OFFICE O/P EST LOW 20 MIN: CPT | Performed by: FAMILY MEDICINE

## 2019-06-20 PROCEDURE — 71046 X-RAY EXAM CHEST 2 VIEWS: CPT

## 2019-07-16 ENCOUNTER — CONSULT (OUTPATIENT)
Dept: SURGERY | Facility: CLINIC | Age: 84
End: 2019-07-16
Payer: MEDICARE

## 2019-07-16 VITALS
SYSTOLIC BLOOD PRESSURE: 160 MMHG | TEMPERATURE: 98.3 F | DIASTOLIC BLOOD PRESSURE: 70 MMHG | HEART RATE: 71 BPM | RESPIRATION RATE: 14 BRPM | WEIGHT: 77 LBS | BODY MASS INDEX: 14.17 KG/M2 | HEIGHT: 62 IN

## 2019-07-16 DIAGNOSIS — R19.00 ABDOMINAL MASS, UNSPECIFIED ABDOMINAL LOCATION: ICD-10-CM

## 2019-07-16 PROCEDURE — 99203 OFFICE O/P NEW LOW 30 MIN: CPT | Performed by: SURGERY

## 2019-07-16 NOTE — PROGRESS NOTES
Consult- General Surgery   Kayden Leo 80 y o  female MRN: [id number]  Unit/Bed#:  Encounter: [id number]    Assessment/Plan     Assessment:  Prominent suture from midline incision, no evidence of tumor or mass  Heavy smoker  Hypertension  Plan:  The patient has no symptoms from the lump, no surgical intervention is needed at this time  History of Present Illness     HPI:  Kayden Leo is a 80 y o  female who presents to my office accompanied by her friend for evaluation of midline lump  The patient noticed the lump approximately 1 year, has been more noticeable, does not cause any discomfort, pain or any other constitutional symptoms  She has just aware that is there  Denies any drainage, redness or any other constitutional symptoms  I reviewed CT scan of the chest, abdomen and pelvis from October 2018, see full report below  Review of Systems   Constitutional: Negative for chills and fever  HENT: Negative for nosebleeds and sore throat  Eyes: Negative for pain and discharge  Respiratory: Positive for cough and shortness of breath  Cardiovascular: Negative for chest pain and palpitations  Gastrointestinal: Negative for abdominal pain, blood in stool, constipation and diarrhea  Endocrine: Negative for cold intolerance and heat intolerance  Genitourinary: Negative for dysuria and hematuria  Neurological: Negative for seizures and headaches  Hematological: Negative for adenopathy  Does not bruise/bleed easily  Psychiatric/Behavioral: Negative for confusion  The patient is not nervous/anxious  Historical Information   Past Medical History:   Diagnosis Date    Dementia     Hypertension     Ovarian cyst      Past Surgical History:   Procedure Laterality Date    APPENDECTOMY      CATARACT EXTRACTION      COLONOSCOPY      COLONOSCOPY N/A 10/5/2017    Procedure: COLONOSCOPY;  Surgeon: Ulisses Goddard MD;  Location: [facility];   Service: Gastroenterology    HYSTERECTOMY  OVARIAN CYST REMOVAL       Social History   Social History     Substance and Sexual Activity   Alcohol Use No     Social History     Substance and Sexual Activity   Drug Use No     Social History     Tobacco Use   Smoking Status Current Every Day Smoker    Packs/day: 1 00    Types: Cigarettes   Smokeless Tobacco Current User   Tobacco Comment    quits every 3-4 years but has been smoking for  years  Family History: non-contributory    Meds/Allergies   all medications and allergies reviewed     Current Outpatient Medications:     acetaminophen (TYLENOL) 650 mg CR tablet, Take 650 mg by mouth every 8 (eight) hours as needed for mild pain, Disp: , Rfl:     amLODIPine (NORVASC) 5 mg tablet, Take 5 mg by mouth daily, Disp: , Rfl:     aspirin (ASPIRIN ADULT LOW DOSE) 81 mg EC tablet, Take by mouth, Disp: , Rfl:     donepezil (ARICEPT) 10 mg tablet, Take 10 mg by mouth daily, Disp: , Rfl:     sertraline (ZOLOFT) 25 mg tablet, Take 25 mg by mouth daily  , Disp: , Rfl:   Allergies   Allergen Reactions    Penicillins Other (See Comments)     Unknown reaction       Objective     Current Vitals:   Blood Pressure: 160/70 (07/16/19 1000)  Pulse: 71 (07/16/19 1000)  Temperature: 98 3 °F (36 8 °C) (07/16/19 1000)  Temp Source: Tympanic (07/16/19 1000)  Respirations: 14 (07/16/19 1000)  Height: 5' 2" (157 5 cm) (07/16/19 1000)  Weight - Scale: 34 9 kg (77 lb) (07/16/19 1000)      Invasive Devices     None                 Physical Exam   Constitutional: She is oriented to person, place, and time  Patient is awake, alert in no acute distress, somewhat cachectic  HENT:   Head: Normocephalic  Mouth/Throat: No oropharyngeal exudate  Eyes: Pupils are equal, round, and reactive to light  No scleral icterus  Neck: Normal range of motion  Cardiovascular: Normal rate and regular rhythm  No murmur heard    Pulmonary/Chest: Effort normal and breath sounds normal    Abdominal:   Abdomen is soft, nondistended and nontender  There is a lump measure approximately 0 5 cm in the midline infraumbilical incision, no tenderness, no redness, feels like a stitch poking through the fascia  There is no evidence of visceromegaly or mass palpable  Musculoskeletal: She exhibits no edema or deformity  Lymphadenopathy:     She has no cervical adenopathy  Neurological: She is alert and oriented to person, place, and time  No cranial nerve deficit  Skin: No rash noted  No erythema  Psychiatric: She has a normal mood and affect  Her behavior is normal    Nursing note and vitals reviewed  Imaging: I have personally reviewed pertinent reports  No results found  EKG, Pathology, and Other Studies: I have personally reviewed pertinent films in PACS   CT CHEST, ABDOMEN AND PELVIS WITH IV CONTRAST     INDICATION:   fall      COMPARISON:  6/2/2018     TECHNIQUE: CT examination of the chest, abdomen and pelvis was performed  Axial, sagittal, and coronal 2D reformatted images were created from the source data and submitted for interpretation      Radiation dose length product (DLP) for this visit:  381 mGy-cm   This examination, like all CT scans performed in the [facility], was performed utilizing techniques to minimize radiation dose exposure, including the use of iterative   reconstruction and automated exposure control      IV Contrast:  80 mL of iohexol (OMNIPAQUE)  Enteric Contrast: Enteric contrast was administered      FINDINGS:     CHEST     LUNGS: Right greater than left biapical pleural parenchymal scarring and scattered regions of bronchiectasis especially right apex similar to prior study    Scattered regions of reticulonodular changes again noted and likely reflecting sequelae of   previous infectious or inflammatory process      Left basilar 6 mm irregular nodule best seen on 3/36 similar to previous exam      PLEURA:  Unremarkable      HEART/GREAT VESSELS:  Unremarkable for patient's age      MEDIASTINUM AND GLENN:  Unremarkable      CHEST WALL AND LOWER NECK:   Unremarkable      ABDOMEN     LIVER/BILIARY TREE:  Fatty liver noted      GALLBLADDER:  No calcified gallstones  No pericholecystic inflammatory change      SPLEEN:  Unremarkable      PANCREAS:  Unremarkable      ADRENAL GLANDS:  Stable left adrenal nodular thickening  Unremarkable right adrenal gland      KIDNEYS/URETERS:  Parapelvic cysts noted on the left as before  Kidneys otherwise unremarkable      STOMACH AND BOWEL:  Postsurgical clips noted throughout the abdomen and pelvis  Nonobstructive bowel gas pattern  No acute findings  Distal anastomosis appears grossly patent      APPENDIX:  No findings to suggest appendicitis      ABDOMINOPELVIC CAVITY:  No ascites or free intraperitoneal air  No lymphadenopathy      VESSELS:  Unremarkable for patient's age      PELVIS     REPRODUCTIVE ORGANS:  Unremarkable for patient's age      URINARY BLADDER:  Unremarkable      ABDOMINAL WALL/INGUINAL REGIONS:  Unremarkable      OSSEOUS STRUCTURES:  No acute fracture or destructive osseous lesion      IMPRESSION:  1  Persistent right greater than left biapical pleural parenchymal scarring and scattered reticulonodular changes as well as bronchiectasis consistent with sequelae of previous infectious or inflammatory process  2   Left basilar 6 mm pulmonary nodule appears stable for the previous 4 months  Based on current Fleischner Society 2017 Guidelines on incidental pulmonary nodule, an additional followup is recommended for 18-24 months from the initial examination  3   No evidence of acute posttraumatic injury throughout the chest, abdomen and pelvis

## 2019-07-16 NOTE — PROGRESS NOTES
Pt states no pain or discomfort  Bowels varies between loose or strained  No nausea or vomiting  No fevers  No bloating or swelling of the abdomen

## 2019-11-18 ENCOUNTER — HOSPITAL ENCOUNTER (INPATIENT)
Facility: HOSPITAL | Age: 84
LOS: 5 days | Discharge: NURSING, REHAB OR LONG-TERM CARE | DRG: 388 | End: 2019-11-23
Attending: EMERGENCY MEDICINE | Admitting: FAMILY MEDICINE
Payer: MEDICARE

## 2019-11-18 ENCOUNTER — APPOINTMENT (EMERGENCY)
Dept: CT IMAGING | Facility: HOSPITAL | Age: 84
DRG: 388 | End: 2019-11-18
Payer: MEDICARE

## 2019-11-18 DIAGNOSIS — K56.609 SMALL BOWEL OBSTRUCTION (HCC): Primary | ICD-10-CM

## 2019-11-18 DIAGNOSIS — F17.200 TOBACCO DEPENDENCE: Chronic | ICD-10-CM

## 2019-11-18 DIAGNOSIS — F02.80 DEMENTIA ASSOCIATED WITH OTHER UNDERLYING DISEASE WITHOUT BEHAVIORAL DISTURBANCE (HCC): ICD-10-CM

## 2019-11-18 DIAGNOSIS — E44.0 MODERATE PROTEIN-CALORIE MALNUTRITION (HCC): ICD-10-CM

## 2019-11-18 LAB
ALBUMIN SERPL BCP-MCNC: 3.1 G/DL (ref 3.5–5)
ALP SERPL-CCNC: 55 U/L (ref 46–116)
ALT SERPL W P-5'-P-CCNC: <6 U/L (ref 12–78)
ANION GAP SERPL CALCULATED.3IONS-SCNC: 12 MMOL/L (ref 4–13)
APTT PPP: 25 SECONDS (ref 23–37)
AST SERPL W P-5'-P-CCNC: 14 U/L (ref 5–45)
BACTERIA UR QL AUTO: ABNORMAL /HPF
BASOPHILS # BLD AUTO: 0.03 THOUSANDS/ΜL (ref 0–0.1)
BASOPHILS NFR BLD AUTO: 0 % (ref 0–1)
BILIRUB SERPL-MCNC: 0.8 MG/DL (ref 0.2–1)
BILIRUB UR QL STRIP: NEGATIVE
BUN SERPL-MCNC: 28 MG/DL (ref 5–25)
CALCIUM SERPL-MCNC: 9.6 MG/DL (ref 8.3–10.1)
CHLORIDE SERPL-SCNC: 103 MMOL/L (ref 100–108)
CLARITY UR: CLEAR
CO2 SERPL-SCNC: 28 MMOL/L (ref 21–32)
COLOR UR: YELLOW
CREAT SERPL-MCNC: 1.04 MG/DL (ref 0.6–1.3)
EOSINOPHIL # BLD AUTO: 0.06 THOUSAND/ΜL (ref 0–0.61)
EOSINOPHIL NFR BLD AUTO: 1 % (ref 0–6)
ERYTHROCYTE [DISTWIDTH] IN BLOOD BY AUTOMATED COUNT: 14.9 % (ref 11.6–15.1)
GFR SERPL CREATININE-BSD FRML MDRD: 48 ML/MIN/1.73SQ M
GLUCOSE SERPL-MCNC: 100 MG/DL (ref 65–140)
GLUCOSE UR STRIP-MCNC: NEGATIVE MG/DL
HCT VFR BLD AUTO: 47 % (ref 34.8–46.1)
HGB BLD-MCNC: 15.3 G/DL (ref 11.5–15.4)
HGB UR QL STRIP.AUTO: ABNORMAL
IMM GRANULOCYTES # BLD AUTO: 0.02 THOUSAND/UL (ref 0–0.2)
IMM GRANULOCYTES NFR BLD AUTO: 0 % (ref 0–2)
INR PPP: 0.99 (ref 0.84–1.19)
KETONES UR STRIP-MCNC: ABNORMAL MG/DL
LACTATE SERPL-SCNC: 1.3 MMOL/L (ref 0.5–2)
LEUKOCYTE ESTERASE UR QL STRIP: NEGATIVE
LYMPHOCYTES # BLD AUTO: 0.78 THOUSANDS/ΜL (ref 0.6–4.47)
LYMPHOCYTES NFR BLD AUTO: 10 % (ref 14–44)
MCH RBC QN AUTO: 32.1 PG (ref 26.8–34.3)
MCHC RBC AUTO-ENTMCNC: 32.6 G/DL (ref 31.4–37.4)
MCV RBC AUTO: 99 FL (ref 82–98)
MONOCYTES # BLD AUTO: 1.04 THOUSAND/ΜL (ref 0.17–1.22)
MONOCYTES NFR BLD AUTO: 14 % (ref 4–12)
NEUTROPHILS # BLD AUTO: 5.66 THOUSANDS/ΜL (ref 1.85–7.62)
NEUTS SEG NFR BLD AUTO: 75 % (ref 43–75)
NITRITE UR QL STRIP: NEGATIVE
NON-SQ EPI CELLS URNS QL MICRO: ABNORMAL /HPF
NRBC BLD AUTO-RTO: 0 /100 WBCS
PH UR STRIP.AUTO: 6 [PH]
PLATELET # BLD AUTO: 269 THOUSANDS/UL (ref 149–390)
PMV BLD AUTO: 10.2 FL (ref 8.9–12.7)
POTASSIUM SERPL-SCNC: 3.8 MMOL/L (ref 3.5–5.3)
PROT SERPL-MCNC: 6.8 G/DL (ref 6.4–8.2)
PROT UR STRIP-MCNC: NEGATIVE MG/DL
PROTHROMBIN TIME: 13.1 SECONDS (ref 11.6–14.5)
RBC # BLD AUTO: 4.76 MILLION/UL (ref 3.81–5.12)
RBC #/AREA URNS AUTO: ABNORMAL /HPF
SODIUM SERPL-SCNC: 143 MMOL/L (ref 136–145)
SP GR UR STRIP.AUTO: 1.01 (ref 1–1.03)
UROBILINOGEN UR QL STRIP.AUTO: 0.2 E.U./DL
WBC # BLD AUTO: 7.59 THOUSAND/UL (ref 4.31–10.16)
WBC #/AREA URNS AUTO: ABNORMAL /HPF

## 2019-11-18 PROCEDURE — 85025 COMPLETE CBC W/AUTO DIFF WBC: CPT | Performed by: PHYSICIAN ASSISTANT

## 2019-11-18 PROCEDURE — 96374 THER/PROPH/DIAG INJ IV PUSH: CPT

## 2019-11-18 PROCEDURE — 99222 1ST HOSP IP/OBS MODERATE 55: CPT | Performed by: FAMILY MEDICINE

## 2019-11-18 PROCEDURE — 85730 THROMBOPLASTIN TIME PARTIAL: CPT | Performed by: PHYSICIAN ASSISTANT

## 2019-11-18 PROCEDURE — 80053 COMPREHEN METABOLIC PANEL: CPT | Performed by: PHYSICIAN ASSISTANT

## 2019-11-18 PROCEDURE — 85610 PROTHROMBIN TIME: CPT | Performed by: PHYSICIAN ASSISTANT

## 2019-11-18 PROCEDURE — 36415 COLL VENOUS BLD VENIPUNCTURE: CPT | Performed by: PHYSICIAN ASSISTANT

## 2019-11-18 PROCEDURE — 74177 CT ABD & PELVIS W/CONTRAST: CPT

## 2019-11-18 PROCEDURE — 96361 HYDRATE IV INFUSION ADD-ON: CPT

## 2019-11-18 PROCEDURE — 83605 ASSAY OF LACTIC ACID: CPT | Performed by: PHYSICIAN ASSISTANT

## 2019-11-18 PROCEDURE — 99284 EMERGENCY DEPT VISIT MOD MDM: CPT | Performed by: PHYSICIAN ASSISTANT

## 2019-11-18 PROCEDURE — 99285 EMERGENCY DEPT VISIT HI MDM: CPT | Performed by: PHYSICIAN ASSISTANT

## 2019-11-18 PROCEDURE — 99285 EMERGENCY DEPT VISIT HI MDM: CPT

## 2019-11-18 PROCEDURE — 81001 URINALYSIS AUTO W/SCOPE: CPT | Performed by: PHYSICIAN ASSISTANT

## 2019-11-18 PROCEDURE — 93005 ELECTROCARDIOGRAM TRACING: CPT

## 2019-11-18 RX ORDER — ONDANSETRON 2 MG/ML
4 INJECTION INTRAMUSCULAR; INTRAVENOUS EVERY 6 HOURS PRN
Status: DISCONTINUED | OUTPATIENT
Start: 2019-11-18 | End: 2019-11-23 | Stop reason: HOSPADM

## 2019-11-18 RX ORDER — MAGNESIUM HYDROXIDE/ALUMINUM HYDROXICE/SIMETHICONE 120; 1200; 1200 MG/30ML; MG/30ML; MG/30ML
30 SUSPENSION ORAL EVERY 6 HOURS PRN
Status: DISCONTINUED | OUTPATIENT
Start: 2019-11-18 | End: 2019-11-23 | Stop reason: HOSPADM

## 2019-11-18 RX ORDER — ONDANSETRON 2 MG/ML
4 INJECTION INTRAMUSCULAR; INTRAVENOUS ONCE
Status: COMPLETED | OUTPATIENT
Start: 2019-11-18 | End: 2019-11-18

## 2019-11-18 RX ORDER — SODIUM CHLORIDE 9 MG/ML
75 INJECTION, SOLUTION INTRAVENOUS CONTINUOUS
Status: DISCONTINUED | OUTPATIENT
Start: 2019-11-18 | End: 2019-11-19

## 2019-11-18 RX ORDER — HYDRALAZINE HYDROCHLORIDE 20 MG/ML
10 INJECTION INTRAMUSCULAR; INTRAVENOUS EVERY 6 HOURS PRN
Status: DISCONTINUED | OUTPATIENT
Start: 2019-11-18 | End: 2019-11-23 | Stop reason: HOSPADM

## 2019-11-18 RX ORDER — HEPARIN SODIUM 5000 [USP'U]/ML
5000 INJECTION, SOLUTION INTRAVENOUS; SUBCUTANEOUS EVERY 8 HOURS SCHEDULED
Status: DISCONTINUED | OUTPATIENT
Start: 2019-11-18 | End: 2019-11-23 | Stop reason: HOSPADM

## 2019-11-18 RX ORDER — ACETAMINOPHEN 325 MG/1
650 TABLET ORAL EVERY 6 HOURS PRN
Status: DISCONTINUED | OUTPATIENT
Start: 2019-11-18 | End: 2019-11-23 | Stop reason: HOSPADM

## 2019-11-18 RX ORDER — NICOTINE 21 MG/24HR
1 PATCH, TRANSDERMAL 24 HOURS TRANSDERMAL DAILY
Status: DISCONTINUED | OUTPATIENT
Start: 2019-11-19 | End: 2019-11-23 | Stop reason: HOSPADM

## 2019-11-18 RX ADMIN — HYDRALAZINE HYDROCHLORIDE 10 MG: 20 INJECTION INTRAMUSCULAR; INTRAVENOUS at 17:56

## 2019-11-18 RX ADMIN — IOHEXOL 85 ML: 350 INJECTION, SOLUTION INTRAVENOUS at 11:58

## 2019-11-18 RX ADMIN — SODIUM CHLORIDE 75 ML/HR: 0.9 INJECTION, SOLUTION INTRAVENOUS at 15:25

## 2019-11-18 RX ADMIN — ONDANSETRON 4 MG: 2 INJECTION INTRAMUSCULAR; INTRAVENOUS at 10:44

## 2019-11-18 RX ADMIN — SODIUM CHLORIDE 75 ML/HR: 0.9 INJECTION, SOLUTION INTRAVENOUS at 21:16

## 2019-11-18 RX ADMIN — HEPARIN SODIUM 5000 UNITS: 5000 INJECTION INTRAVENOUS; SUBCUTANEOUS at 22:44

## 2019-11-18 RX ADMIN — SODIUM CHLORIDE 1000 ML: 0.9 INJECTION, SOLUTION INTRAVENOUS at 10:33

## 2019-11-18 NOTE — H&P
H&P- Deisy Araya [birth year], 80 y o  female MRN: [id number]    Unit/Bed#: ED 22 Encounter: [id number]    Primary Care Provider: Jodi Swanson MD   Date and time admitted to hospital: 11/18/2019 10:20 AM      * Small bowel obstruction ([facility] )  Assessment & Plan  · On admission, patient presents approximately 2 days of abdominal pain with associated nausea and vomiting  CT scan is revealing a high-grade small-bowel obstruction, possibly secondary to adhesions  · Patient will be NPO pending surgical evaluation  · Continue IV fluid hydration  Labs are reasonable, electrolytes not abnormal and no leukocytosis  · Nutrition consult secondary to malnutrition  · Daughter updated by phone  She will be bringing in the medication list with her tomorrow  Dementia [facility])  Assessment & Plan  · Patient does have dementia, daughter reports she is pretty forgetful at times at home  · Dementia precautions, reoriented as needed  · Holding donepezil and sertraline for now given NPO  Hypertension  Assessment & Plan  · Hold home medications pending verification  · Will provide IV medications as needed  Malnutrition ([facility] )  Assessment & Plan  Malnutrition Findings:           BMI Findings: Body mass index is 14 52 kg/m²  Patient with a significantly low BMI, nutrition consulted  Low albumin, suspect chronic malnutrition secondary to age and dementia and inadequate caloric intake  She has bitemporal wasting, prominent clavicles and ribs, prominent spinous processes, fat and muscle wasting  VTE Prophylaxis: Heparin  / sequential compression device   Code Status: FULL - discussed with patient daughter by phone and seems like they would lean towards DNR/DNI, but there is nothing official on this right now   POLST: POLST form is not discussed and not completed at this time    Discussion with family: daughter, Pavan Mohan, by phone     Anticipated Length of Stay:  Patient will be admitted on an Inpatient basis with an anticipated length of stay of  > 2 midnights  Justification for Hospital Stay: small bowel obstruction management     Total Time for Visit, including Counseling / Coordination of Care: 45 minutes  Greater than 50% of this total time spent on direct patient counseling and coordination of care  Chief Complaint:   "check up"     History of Present Illness: Deisy Araya is a 80 y o  female who presents with nausea, vomiting, abdominal pain x2 days  Patient was sent in by EMS from her home by the daughter with whom she lives  She has been having abdominal pain with associated nausea and vomiting for the past 2 days  She has an underlying history of dementia, continued tobacco abuse, hypertension, and chronic malnutrition  Patient is a poor historian at baseline and reports she came in for a checkup  Presently denies any abdominal pain or nausea  Does report she continues to smoke, but when questioned about COPD are underlying lung issues, she is unsure  Review of Systems:    Review of Systems   Unable to perform ROS: Dementia (Limited secondary to poor historian)   Respiratory: Positive for shortness of breath ( upon awakening, this is chronic)  Cardiovascular: Negative for chest pain  Gastrointestinal: Positive for abdominal pain, nausea and vomiting (No hematemesis per daughter)  Psychiatric/Behavioral: Positive for confusion ( at baseline)  Past Medical and Surgical History:     Past Medical History:   Diagnosis Date    Dementia ([facility] )     Hypertension     Ovarian cyst        Past Surgical History:   Procedure Laterality Date    APPENDECTOMY      CATARACT EXTRACTION      COLONOSCOPY      COLONOSCOPY N/A 10/5/2017    Procedure: COLONOSCOPY;  Surgeon: Cathleen Michaud MD;  Location: [facility]; Service: Gastroenterology    HYSTERECTOMY      OVARIAN CYST REMOVAL         Meds/Allergies:    Prior to Admission medications    Medication Sig Start Date End Date Taking?  Authorizing Provider   acetaminophen (TYLENOL) 650 mg CR tablet Take 650 mg by mouth every 8 (eight) hours as needed for mild pain    Historical Provider, MD   amLODIPine (NORVASC) 5 mg tablet Take 5 mg by mouth daily    Historical Provider, MD   aspirin (ASPIRIN ADULT LOW DOSE) 81 mg EC tablet Take by mouth    Historical Provider, MD   donepezil (ARICEPT) 10 mg tablet Take 10 mg by mouth daily    Historical Provider, MD   sertraline (ZOLOFT) 25 mg tablet Take 25 mg by mouth daily      Historical Provider, MD     I have been unable to obtain / verify an up to date medication list despite all reasonable attempts  Daughter will be bringing the medication list tomorrow    Allergies: Allergies   Allergen Reactions    Penicillins Other (See Comments)     Unknown reaction       Social History:     Marital Status:    Occupation: n/a  Patient Pre-hospital Living Situation:  Home with daughter  Patient Pre-hospital Level of Mobility:  Unclear  Patient Pre-hospital Diet Restrictions:  None  Substance Use History:   Social History     Substance and Sexual Activity   Alcohol Use No     Social History     Tobacco Use   Smoking Status Current Every Day Smoker    Packs/day: 1 00    Types: Cigarettes   Smokeless Tobacco Current User   Tobacco Comment    quits every 3-4 years but has been smoking for  years  Social History     Substance and Sexual Activity   Drug Use No       Family History:    Family History   Problem Relation Age of Onset    No Known Problems Mother     Hypertension Father        Physical Exam:     Vitals:   Blood Pressure: (!) 187/77 (11/18/19 1530)  Pulse: 78 (11/18/19 1530)  Respirations: 14 (11/18/19 1530)  Height: 5' 2" (157 5 cm) (11/18/19 1023)  Weight - Scale: 36 kg (79 lb 5 9 oz) (11/18/19 1023)  SpO2: 96 % (11/18/19 1530)    Physical Exam   Constitutional: She appears well-developed  She appears cachectic  Non-toxic appearance  She appears ill (Chronically)  No distress     Incredibly frail-appearing, bitemporal wasting, prominent clavicles/ribs/spinous processes, diffuse muscle/fat wasting   HENT:   Head: Normocephalic and atraumatic  Mouth/Throat: Mucous membranes are dry  Eyes: Conjunctivae are normal    Cardiovascular: Normal rate, regular rhythm, S1 normal, S2 normal and normal heart sounds  No murmur heard  Pulmonary/Chest: Effort normal  No respiratory distress  She has no wheezes  She has rhonchi  Coarse throughout   Abdominal: Soft  Normal appearance and bowel sounds are normal  She exhibits no distension  There is no tenderness  There is no rebound and no guarding  Musculoskeletal: She exhibits no edema  Neurological: She is alert  No cranial nerve deficit  Skin: Skin is warm, dry and intact  No pallor  Psychiatric: She has a normal mood and affect  Cognition and memory are impaired  Nursing note and vitals reviewed  Additional Data:     Lab Results: I have personally reviewed pertinent reports  Results from last 7 days   Lab Units 11/18/19  1040   WBC Thousand/uL 7 59   HEMOGLOBIN g/dL 15 3   HEMATOCRIT % 47 0*   PLATELETS Thousands/uL 269   NEUTROS PCT % 75   LYMPHS PCT % 10*   MONOS PCT % 14*   EOS PCT % 1     Results from last 7 days   Lab Units 11/18/19  1118   SODIUM mmol/L 143   POTASSIUM mmol/L 3 8   CHLORIDE mmol/L 103   CO2 mmol/L 28   BUN mg/dL 28*   CREATININE mg/dL 1 04   ANION GAP mmol/L 12   CALCIUM mg/dL 9 6   ALBUMIN g/dL 3 1*   TOTAL BILIRUBIN mg/dL 0 80   ALK PHOS U/L 55   ALT U/L <6*   AST U/L 14   GLUCOSE RANDOM mg/dL 100     Results from last 7 days   Lab Units 11/18/19  1040   INR  0 99             Results from last 7 days   Lab Units 11/18/19  1118   LACTIC ACID mmol/L 1 3       Imaging: I have personally reviewed pertinent reports  CT abdomen pelvis with contrast   Final Result by Paula Hyatt MD (11/18 2926)      1    Findings in keeping with high-grade small bowel obstruction with transition point in the right lower quadrant, likely on the basis of adhesions  2   Tree-in-bud and centrilobular nodules at the lung bases as well as partially imaged bronchiectasis in the right middle lobe and the lingula, similar to prior exams  The findings are likely on the basis of chronic infectious/inflammatory processes    such as mycobacterial infection  3   Stable 1 8 cm cystic pancreatic head lesion with internal septation  4   Stable 1 6 cm left adrenal nodule  The study was marked in [facility] for immediate notification  Workstation performed: [id number]             EKG, Pathology, and Other Studies Reviewed on Admission:   · EKG:     Allscripts / Epic Records Reviewed: Yes     ** Please Note: This note has been constructed using a voice recognition system   **

## 2019-11-18 NOTE — CONSULTS
Consult- General Surgery   Princess Arellano 80 y o  female MRN: [id number]  Unit/Bed#: ED 22 Encounter: [id number]          Assessment/Plan     Princess Arellano is a 80 y o  female    Small bowel obstruction  CT of abdomen and pelvis showed findings in keeping with high-grade small bowel obstruction with transition point in the right lower quadrant    No emergent surgical intervention is indicated at this time, patient is in no acute distress and abdomen is soft and non-distended with active bowel sounds on examination  No leukocytosis or lactic acidosis on presentation either  Will proceed with conservative therapy with NPO and IVF until return of bowel function  Will hold off on NG tube decompression at this time as patient is reporting resolution of nausea/vomiting and stomach on CT does not appear to be distended  If patient develops recurrent nausea or vomiting, NG tube should be inserted for decompression   Serial abdominal exams  Medicine primary  History of Present Illness     HPI:  Princess Arellano is a 80 y o  female who presents with a small bowel obstruction  Patient has history of dementia and during examination was oriented to person and place but not to time  She does appear to provide a reasonable history  She states she has experienced a two day history of intermittent abdominal pain with nausea and vomiting at night  CT of the abdomen and pelvis in the ED showed findings consistent with small bowel obstruction with a transition point in the RLQ  At this time, patient states her abdominal pain, nausea, and vomiting have resolved  She states her last bowel movement was prior to onset of abdominal pain, but she does note a history of irregular bowel movements  She denies passing any flatus  She reports a medical history of hypertension and a surgical history of an exploratory laparotomy for unknown reasons, hysterectomy, C section, and appendectomy   She denies any prior history of bowel obstructions  Review of Systems   Unable to perform ROS: Dementia (ROS limited secondary to dementia)   Constitutional: Negative for activity change, appetite change, fatigue, fever and unexpected weight change  HENT: Negative for congestion, tinnitus and voice change  Eyes: Negative for photophobia, discharge and visual disturbance  Respiratory: Positive for shortness of breath  Negative for apnea, chest tightness, wheezing and stridor  Cardiovascular: Negative for chest pain and leg swelling  Gastrointestinal: Positive for abdominal pain, nausea and vomiting  Negative for abdominal distention, constipation, diarrhea and rectal pain  Endocrine: Negative for cold intolerance, polydipsia, polyphagia and polyuria  Genitourinary: Negative for decreased urine volume, difficulty urinating, dysuria, flank pain, hematuria, pelvic pain and urgency  Musculoskeletal: Negative for back pain, neck pain and neck stiffness  Skin: Negative for color change, pallor, rash and wound  Neurological: Negative for dizziness, tremors, syncope and weakness  Historical Information   Past Medical History:   Diagnosis Date    Dementia ([facility] [address] )     Hypertension     Ovarian cyst      Past Surgical History:   Procedure Laterality Date    APPENDECTOMY      CATARACT EXTRACTION      COLONOSCOPY      COLONOSCOPY N/A 10/5/2017    Procedure: COLONOSCOPY;  Surgeon: Hector Gee MD;  Location: [facility]; Service: Gastroenterology    HYSTERECTOMY      OVARIAN CYST REMOVAL       Social History   Social History     Substance and Sexual Activity   Alcohol Use No     Social History     Substance and Sexual Activity   Drug Use No     Social History     Tobacco Use   Smoking Status Current Every Day Smoker    Packs/day: 1 00    Types: Cigarettes   Smokeless Tobacco Current User   Tobacco Comment    quits every 3-4 years but has been smoking for  years        Family History:   Family History   Problem Relation Age of Onset    No Known Problems Mother     Hypertension Father        Meds/Allergies   PTA meds:   Prior to Admission Medications   Prescriptions Last Dose Informant Patient Reported? Taking?   acetaminophen (TYLENOL) 650 mg CR tablet  Self Yes No   Sig: Take 650 mg by mouth every 8 (eight) hours as needed for mild pain   amLODIPine (NORVASC) 5 mg tablet  Self Yes No   Sig: Take 5 mg by mouth daily   aspirin (ASPIRIN ADULT LOW DOSE) 81 mg EC tablet  Self Yes No   Sig: Take by mouth   donepezil (ARICEPT) 10 mg tablet  Self Yes No   Sig: Take 10 mg by mouth daily   sertraline (ZOLOFT) 25 mg tablet  Self Yes No   Sig: Take 25 mg by mouth daily        Facility-Administered Medications: None     Allergies   Allergen Reactions    Penicillins Other (See Comments)     Unknown reaction       Objective   First Vitals:   Blood Pressure: (!) 171/75 (11/18/19 1130)  Pulse: 81 (11/18/19 1023)  Respirations: 19 (11/18/19 1023)  Height: 5' 2" (157 5 cm) (11/18/19 1023)  Weight - Scale: 36 kg (79 lb 5 9 oz) (11/18/19 1023)  SpO2: 97 % (11/18/19 1023)    Current Vitals:   Blood Pressure: (!) 187/77 (11/18/19 1530)  Pulse: 78 (11/18/19 1530)  Respirations: 14 (11/18/19 1530)  Height: 5' 2" (157 5 cm) (11/18/19 1023)  Weight - Scale: 36 kg (79 lb 5 9 oz) (11/18/19 1023)  SpO2: 96 % (11/18/19 1530)      Intake/Output Summary (Last 24 hours) at 11/18/2019 1603  Last data filed at 11/18/2019 1514  Gross per 24 hour   Intake 1000 ml   Output    Net 1000 ml       Invasive Devices     Peripheral Intravenous Line            Peripheral IV 11/18/19 Right Antecubital less than 1 day    Peripheral IV 11/18/19 Right Arm less than 1 day                Physical Exam   Constitutional: No distress  Very frail, chronically ill appearing with bitemporal wasting   HENT:   Head: Normocephalic and atraumatic  Right Ear: External ear normal    Left Ear: External ear normal    Mouth/Throat: Oropharynx is clear and moist  No oropharyngeal exudate     Eyes: Pupils are equal, round, and reactive to light  Conjunctivae and EOM are normal    Neck: Normal range of motion  Neck supple  No tracheal deviation present  No thyromegaly present  Cardiovascular: Normal rate, regular rhythm, normal heart sounds and intact distal pulses  Exam reveals no gallop and no friction rub  No murmur heard  Pulmonary/Chest: Effort normal  No respiratory distress  She has no wheezes  She has no rales  She exhibits no tenderness  Decreased breath sounds   Abdominal: Soft  Bowel sounds are normal  She exhibits no distension  There is no tenderness  There is no rebound and no guarding  Soft, non-distended, active bowel sounds, midline mass and palpable suture from prior surgical intervention, non-tender to palpation, notes diffuse pressure with palpation but no pain, no guarding or rebound, healed midline incision and Pfannenstiel incision   Musculoskeletal: Normal range of motion  She exhibits no edema, tenderness or deformity  Neurological: She has normal reflexes  No cranial nerve deficit  Coordination normal    Oriented to person and place, disoriented to time states year is 1999   Skin: Skin is warm and dry  No rash noted  She is not diaphoretic  No erythema  No pallor  Lab Results: I have personally reviewed pertinent lab results      Recent Results (from the past 36 hour(s))   CBC and differential    Collection Time: 11/18/19 10:40 AM   Result Value Ref Range    WBC 7 59 4 31 - 10 16 Thousand/uL    RBC 4 76 3 81 - 5 12 Million/uL    Hemoglobin 15 3 11 5 - 15 4 g/dL    Hematocrit 47 0 (H) 34 8 - 46 1 %    MCV 99 (H) 82 - 98 fL    MCH 32 1 26 8 - 34 3 pg    MCHC 32 6 31 4 - 37 4 g/dL    RDW 14 9 11 6 - 15 1 %    MPV 10 2 8 9 - 12 7 fL    Platelets 536 107 - 451 Thousands/uL    nRBC 0 /100 WBCs    Neutrophils Relative 75 43 - 75 %    Immat GRANS % 0 0 - 2 %    Lymphocytes Relative 10 (L) 14 - 44 %    Monocytes Relative 14 (H) 4 - 12 %    Eosinophils Relative 1 0 - 6 % Basophils Relative 0 0 - 1 %    Neutrophils Absolute 5 66 1 85 - 7 62 Thousands/µL    Immature Grans Absolute 0 02 0 00 - 0 20 Thousand/uL    Lymphocytes Absolute 0 78 0 60 - 4 47 Thousands/µL    Monocytes Absolute 1 04 0 17 - 1 22 Thousand/µL    Eosinophils Absolute 0 06 0 00 - 0 61 Thousand/µL    Basophils Absolute 0 03 0 00 - 0 10 Thousands/µL   Protime-INR    Collection Time: 11/18/19 10:40 AM   Result Value Ref Range    Protime 13 1 11 6 - 14 5 seconds    INR 0 99 0 84 - 1 19   APTT    Collection Time: 11/18/19 10:40 AM   Result Value Ref Range    PTT 25 23 - 37 seconds   Comprehensive metabolic panel    Collection Time: 11/18/19 11:18 AM   Result Value Ref Range    Sodium 143 136 - 145 mmol/L    Potassium 3 8 3 5 - 5 3 mmol/L    Chloride 103 100 - 108 mmol/L    CO2 28 21 - 32 mmol/L    ANION GAP 12 4 - 13 mmol/L    BUN 28 (H) 5 - 25 mg/dL    Creatinine 1 04 0 60 - 1 30 mg/dL    Glucose 100 65 - 140 mg/dL    Calcium 9 6 8 3 - 10 1 mg/dL    AST 14 5 - 45 U/L    ALT <6 (L) 12 - 78 U/L    Alkaline Phosphatase 55 46 - 116 U/L    Total Protein 6 8 6 4 - 8 2 g/dL    Albumin 3 1 (L) 3 5 - 5 0 g/dL    Total Bilirubin 0 80 0 20 - 1 00 mg/dL    eGFR 48 ml/min/1 73sq m   Lactic acid, plasma    Collection Time: 11/18/19 11:18 AM   Result Value Ref Range    LACTIC ACID 1 3 0 5 - 2 0 mmol/L   UA w Reflex to Microscopic w Reflex to Culture    Collection Time: 11/18/19 12:26 PM   Result Value Ref Range    Color, UA Yellow     Clarity, UA Clear     Specific [place], UA 1 010 1 003 - 1 030    pH, UA 6 0 4 5, 5 0, 5 5, 6 0, 6 5, 7 0, 7 5, 8 0    Leukocytes, UA Negative Negative    Nitrite, UA Negative Negative    Protein, UA Negative Negative mg/dl    Glucose, UA Negative Negative mg/dl    Ketones, UA Trace (A) Negative mg/dl    Urobilinogen, UA 0 2 0 2, 1 0 E U /dl E U /dl    Bilirubin, UA Negative Negative    Blood, UA Trace-Intact (A) Negative   Urine Microscopic    Collection Time: 11/18/19 12:26 PM   Result Value Ref Range RBC, UA 1-2 (A) None Seen, 0-5 /hpf    WBC, UA 1-2 (A) None Seen, 0-5, 5-55, 5-65 /hpf    Epithelial Cells Occasional None Seen, Occasional /hpf    Bacteria, UA Occasional None Seen, Occasional /hpf     Imaging: I have personally reviewed pertinent reports  Ct Abdomen Pelvis With Contrast    Result Date: 11/18/2019  Impression: 1  Findings in keeping with high-grade small bowel obstruction with transition point in the right lower quadrant, likely on the basis of adhesions  2   Tree-in-bud and centrilobular nodules at the lung bases as well as partially imaged bronchiectasis in the right middle lobe and the lingula, similar to prior exams  The findings are likely on the basis of chronic infectious/inflammatory processes such as mycobacterial infection  3   Stable 1 8 cm cystic pancreatic head lesion with internal septation  4   Stable 1 6 cm left adrenal nodule  The study was marked in [facility] for immediate notification  Workstation performed: [id number]       EKG, Pathology, and Other Studies: I have personally reviewed pertinent reports

## 2019-11-18 NOTE — ASSESSMENT & PLAN NOTE
· Patient does have dementia, daughter reports she is pretty forgetful at times at home  · Dementia precautions, reoriented as needed  · Holding donepezil and sertraline for now given NPO

## 2019-11-18 NOTE — ASSESSMENT & PLAN NOTE
· On admission, patient presents approximately 2 days of abdominal pain with associated nausea and vomiting  CT scan is revealing a high-grade small-bowel obstruction, possibly secondary to adhesions  · Patient will be NPO pending surgical evaluation  · Continue IV fluid hydration  Labs are reasonable, electrolytes not abnormal and no leukocytosis  · Nutrition consult secondary to malnutrition  · Daughter updated by phone  She will be bringing in the medication list with her tomorrow

## 2019-11-18 NOTE — ED PROVIDER NOTES
History  Chief Complaint   Patient presents with    Vomiting     pt presents via EMS for c/o vomiting and generilized abd pain for the past 2 days  80-year-old female with past medical history significant for dementia, hypertension and history of a lung mass presents to the emergency department via EMS with chief complaint of nausea, vomiting and abdominal pain  Onset of symptoms reported as 2 days ago  Location of symptoms reported as the lower abdomen  Quality is reported as sharp pain  Severity is reported as moderate  Associated symptoms:  Positive for nausea  Positive for vomiting  Denies diarrhea  Denies fevers  Denies chest pain  Denies shortness of breath  Modifying factors:  Patient reports no known aggravating or alleviating factors  Context:  Patient denies any recent fall injury or trauma  She reports a past surgical history including a remote hysterectomy  She denies any recent spoiled foods or sick contacts  Denies any recent travel outside the country  She lives at home  Reviewed past visits via [facility]:  Patient was last seen in the emergency department on May 6, 2019 for evaluation of peripheral edema  History provided by:  Patient and EMS personnel   used: No    Vomiting   Associated symptoms: abdominal pain    Associated symptoms: no arthralgias, no chills, no cough, no diarrhea, no fever, no headaches, no myalgias and no sore throat        Prior to Admission Medications   Prescriptions Last Dose Informant Patient Reported?  Taking?   acetaminophen (TYLENOL) 650 mg CR tablet  Self Yes No   Sig: Take 650 mg by mouth every 8 (eight) hours as needed for mild pain   amLODIPine (NORVASC) 5 mg tablet  Self Yes No   Sig: Take 5 mg by mouth daily   aspirin (ASPIRIN ADULT LOW DOSE) 81 mg EC tablet  Self Yes No   Sig: Take by mouth   donepezil (ARICEPT) 10 mg tablet  Self Yes No   Sig: Take 10 mg by mouth daily   sertraline (ZOLOFT) 25 mg tablet  Self Yes No Sig: Take 25 mg by mouth daily        Facility-Administered Medications: None       Past Medical History:   Diagnosis Date    Dementia ([address] )     Hypertension     Ovarian cyst        Past Surgical History:   Procedure Laterality Date    APPENDECTOMY      CATARACT EXTRACTION      COLONOSCOPY      COLONOSCOPY N/A 10/5/2017    Procedure: COLONOSCOPY;  Surgeon: Michele Orr MD;  Location: [facility]; Service: Gastroenterology    HYSTERECTOMY      OVARIAN CYST REMOVAL         Family History   Problem Relation Age of Onset    No Known Problems Mother     Hypertension Father      I have reviewed and agree with the history as documented  Social History     Tobacco Use    Smoking status: Current Every Day Smoker     Packs/day: 1 00     Types: Cigarettes    Smokeless tobacco: Current User    Tobacco comment: quits every 3-4 years but has been smoking for  years  Substance Use Topics    Alcohol use: No    Drug use: No        Review of Systems   Constitutional: Negative for activity change, appetite change, chills, diaphoresis, fatigue, fever and unexpected weight change  HENT: Positive for hearing loss  Negative for congestion, dental problem, drooling, ear discharge, ear pain, facial swelling, mouth sores, nosebleeds, postnasal drip, rhinorrhea, sinus pressure, sinus pain, sneezing, sore throat, tinnitus, trouble swallowing and voice change  Eyes: Negative for photophobia, pain, discharge, redness, itching and visual disturbance  Respiratory: Negative for apnea, cough, choking, chest tightness, shortness of breath, wheezing and stridor  Cardiovascular: Negative for chest pain, palpitations and leg swelling  Gastrointestinal: Positive for abdominal pain, nausea and vomiting  Negative for abdominal distention, anal bleeding, blood in stool, constipation, diarrhea and rectal pain  Endocrine: Negative for cold intolerance, heat intolerance, polydipsia, polyphagia and polyuria  Genitourinary: Negative for decreased urine volume, difficulty urinating, dyspareunia, dysuria, enuresis, flank pain, frequency, hematuria, menstrual problem, pelvic pain, urgency, vaginal bleeding, vaginal discharge and vaginal pain  Musculoskeletal: Negative for arthralgias, back pain, gait problem, joint swelling, myalgias, neck pain and neck stiffness  Skin: Negative for color change, pallor, rash and wound  Allergic/Immunologic: Negative for environmental allergies, food allergies and immunocompromised state  Neurological: Negative for dizziness, tremors, seizures, syncope, facial asymmetry, speech difficulty, weakness, light-headedness, numbness and headaches  Hematological: Negative for adenopathy  Does not bruise/bleed easily  Psychiatric/Behavioral: Negative for agitation, confusion, hallucinations, self-injury and suicidal ideas  The patient is not hyperactive  All other systems reviewed and are negative  Physical Exam  Physical Exam   Constitutional: She is oriented to person, place, and time  She appears well-developed and well-nourished  No distress  Pulse 81   Resp 19   Ht 5' 2" (1 575 m)   Wt 36 kg (79 lb 5 9 oz)   SpO2 97%   BMI 14 52 kg/m²      HENT:   Head: Normocephalic and atraumatic  Right Ear: External ear normal    Left Ear: External ear normal    Nose: Nose normal    Mouth/Throat: Oropharynx is clear and moist  No oropharyngeal exudate  Eyes: Conjunctivae and EOM are normal  Right eye exhibits no discharge  Left eye exhibits no discharge  No scleral icterus  Neck: Normal range of motion  Neck supple  No JVD present  No tracheal deviation present  Cardiovascular: Normal rate, regular rhythm and intact distal pulses  Pulmonary/Chest: Effort normal and breath sounds normal  No stridor  No respiratory distress  She has no wheezes  She has no rales  She exhibits no tenderness  Abdominal: Soft  She exhibits no distension and no mass  There is tenderness  There is no rebound and no guarding  No hernia  Mild tenderness to palpation left lower quadrant  No pulsatile masses  Musculoskeletal: Normal range of motion  She exhibits no edema, tenderness or deformity  Lymphadenopathy:     She has no cervical adenopathy  Neurological: She is alert and oriented to person, place, and time  She displays normal reflexes  No cranial nerve deficit or sensory deficit  She exhibits normal muscle tone  Coordination normal    Skin: Skin is warm and dry  Capillary refill takes less than 2 seconds  No rash noted  She is not diaphoretic  No erythema  No pallor  Psychiatric: She has a normal mood and affect  Her behavior is normal  Judgment and thought content normal    Nursing note and vitals reviewed        Vital Signs  ED Triage Vitals   Temp Pulse Respirations Blood Pressure SpO2   -- 11/18/19 1023 11/18/19 1023 11/18/19 1130 11/18/19 1023    81 19 (!) 171/75 97 %      Temp src Heart Rate Source Patient Position - Orthostatic VS BP Location FiO2 (%)   -- 11/18/19 1023 11/18/19 1023 11/18/19 1023 --    Monitor Lying Right arm       Pain Score       11/18/19 1023       2           Vitals:    11/18/19 1130 11/18/19 1215 11/18/19 1330 11/18/19 1530   BP: (!) 171/75 (!) 173/74  (!) 187/77   Pulse: 73 78 81 78   Patient Position - Orthostatic VS:             Visual Acuity      ED Medications  Medications   sodium chloride 0 9 % infusion (75 mL/hr Intravenous New Bag 11/18/19 1525)   sodium chloride 0 9 % bolus 1,000 mL (0 mL Intravenous Stopped 11/18/19 1514)   ondansetron (ZOFRAN) injection 4 mg (4 mg Intravenous Given 11/18/19 1044)   iohexol (OMNIPAQUE) 350 MG/ML injection (MULTI-DOSE) 85 mL (85 mL Intravenous Given 11/18/19 1158)       Diagnostic Studies  Results Reviewed     Procedure Component Value Units Date/Time    Urine Microscopic [[id number]]  (Abnormal) Collected:  11/18/19 1226    Lab Status:  Final result Specimen:  Urine, Clean Catch Updated:  11/18/19 1303     RBC, UA 1-2 /hpf      WBC, UA 1-2 /hpf      Epithelial Cells Occasional /hpf      Bacteria, UA Occasional /hpf     UA w Reflex to Microscopic w Reflex to Culture [[id number]]  (Abnormal) Collected:  11/18/19 1226    Lab Status:  Final result Specimen:  Urine, Clean Catch Updated:  11/18/19 1236     Color, UA Yellow     Clarity, UA Clear     Specific Gravity, UA 1 010     pH, UA 6 0     Leukocytes, UA Negative     Nitrite, UA Negative     Protein, UA Negative mg/dl      Glucose, UA Negative mg/dl      Ketones, UA Trace mg/dl      Urobilinogen, UA 0 2 E U /dl      Bilirubin, UA Negative     Blood, UA Trace-Intact    Lactic acid, plasma [[id number]]  (Normal) Collected:  11/18/19 1118    Lab Status:  Final result Specimen:  Blood from Arm, Left Updated:  11/18/19 1146     LACTIC ACID 1 3 mmol/L     Narrative:       Result may be elevated if tourniquet was used during collection      Comprehensive metabolic panel [[id number]]  (Abnormal) Collected:  11/18/19 1118    Lab Status:  Final result Specimen:  Blood from Arm, Left Updated:  11/18/19 1142     Sodium 143 mmol/L      Potassium 3 8 mmol/L      Chloride 103 mmol/L      CO2 28 mmol/L      ANION GAP 12 mmol/L      BUN 28 mg/dL      Creatinine 1 04 mg/dL      Glucose 100 mg/dL      Calcium 9 6 mg/dL      AST 14 U/L      ALT <6 U/L      Alkaline Phosphatase 55 U/L      Total Protein 6 8 g/dL      Albumin 3 1 g/dL      Total Bilirubin 0 80 mg/dL      eGFR 48 ml/min/1 73sq m     Narrative:       Deisi guidelines for Chronic Kidney Disease (CKD):     Stage 1 with normal or high GFR (GFR > 90 mL/min/1 73 square meters)    Stage 2 Mild CKD (GFR = 60-89 mL/min/1 73 square meters)    Stage 3A Moderate CKD (GFR = 45-59 mL/min/1 73 square meters)    Stage 3B Moderate CKD (GFR = 30-44 mL/min/1 73 square meters)    Stage 4 Severe CKD (GFR = 15-29 mL/min/1 73 square meters)    Stage 5 End Stage CKD (GFR <15 mL/min/1 73 square meters)  Note: GFR calculation is accurate only with a steady state creatinine    APTT [[id number]]  (Normal) Collected:  11/18/19 1040    Lab Status:  Final result Specimen:  Blood from Arm, Right Updated:  11/18/19 1113     PTT 25 seconds     Protime-INR [[id number]]  (Normal) Collected:  11/18/19 1040    Lab Status:  Final result Specimen:  Blood from Arm, Right Updated:  11/18/19 1113     Protime 13 1 seconds      INR 0 99    CBC and differential [[id number]]  (Abnormal) Collected:  11/18/19 1040    Lab Status:  Final result Specimen:  Blood from Arm, Right Updated:  11/18/19 1051     WBC 7 59 Thousand/uL      RBC 4 76 Million/uL      Hemoglobin 15 3 g/dL      Hematocrit 47 0 %      MCV 99 fL      MCH 32 1 pg      MCHC 32 6 g/dL      RDW 14 9 %      MPV 10 2 fL      Platelets 091 Thousands/uL      nRBC 0 /100 WBCs      Neutrophils Relative 75 %      Immat GRANS % 0 %      Lymphocytes Relative 10 %      Monocytes Relative 14 %      Eosinophils Relative 1 %      Basophils Relative 0 %      Neutrophils Absolute 5 66 Thousands/µL      Immature Grans Absolute 0 02 Thousand/uL      Lymphocytes Absolute 0 78 Thousands/µL      Monocytes Absolute 1 04 Thousand/µL      Eosinophils Absolute 0 06 Thousand/µL      Basophils Absolute 0 03 Thousands/µL                  CT abdomen pelvis with contrast   Final Result by Sterling Anderson MD (11/18 9952)      1  Findings in keeping with high-grade small bowel obstruction with transition point in the right lower quadrant, likely on the basis of adhesions  2   Tree-in-bud and centrilobular nodules at the lung bases as well as partially imaged bronchiectasis in the right middle lobe and the lingula, similar to prior exams  The findings are likely on the basis of chronic infectious/inflammatory processes    such as mycobacterial infection  3   Stable 1 8 cm cystic pancreatic head lesion with internal septation  4   Stable 1 6 cm left adrenal nodule        The study was marked in [facility] for immediate notification  Workstation performed: [id number]                    Procedures  ECG 12 Lead Documentation Only  Date/Time: 11/18/2019 10:32 AM  Performed by: Mao Arcos PA-C  Authorized by: Mao Arcos PA-C     Indications / Diagnosis:  C/p  ECG reviewed by me, the ED Provider: yes    Patient location:  ED  Previous ECG:     Previous ECG:  Compared to current    Comparison ECG info: May 6, 2019    Similarity:  No change    Comparison to cardiac monitor: Yes    Interpretation:     Interpretation: normal    Rate:     ECG rate:  78    ECG rate assessment: normal    Rhythm:     Rhythm: sinus rhythm    Ectopy:     Ectopy: none    QRS:     QRS axis:  Normal    QRS intervals:  Normal  Conduction:     Conduction: normal    ST segments:     ST segments:  Normal  T waves:     T waves: normal    Other findings:     Other findings: ANAY             ED Course  ED Course as of Nov 18 1618   Mon Nov 18, 2019   1104 Lab results reviewed  CBC demonstrates normal white blood cell count of 7 5, hemoglobin of 15 3 is normal, hematocrit of 47 0 is mildly elevated  [address] [facility] surgery paged regarding admission for ct scan findings of small bowel obstruction               Identification of Seniors at Risk      Most Recent Value   (ISAR) Identification of Seniors at Risk   Before the illness or injury that brought you to the Emergency, did you need someone to help you on a regular basis? 1 Filed at: 11/18/2019 1025   In the last 24 hours, have you needed more help than usual?  0 Filed at: 11/18/2019 1025   Have you been hospitalized for one or more nights during the past 6 months? 0 Filed at: 11/18/2019 1025   In general, do you see well?  0 Filed at: 11/18/2019 1025   In general, do you have serious problems with your memory? 0 Filed at: 11/18/2019 1025   Do you take more than three different medications every day?   1 Filed at: 11/18/2019 1025   ISAR Score  2 Filed at: 11/18/2019 1025 MDM  Number of Diagnoses or Management Options  Small bowel obstruction [facility]): new and requires workup  Diagnosis management comments: Differential diagnosis includes but is not limited to appendicitis, diverticulitis, gastroenteritis, gastritis, cholecystitis, pancreatitis, mesenteric adenitis, kidney stone, pyelonephritis, UTI  Plan workup including labs, ct scan abd/pelvis    Lab results reviewed  Urinalysis demonstrates negative leukocytes, negative nitrites and trace blood  Comprehensive metabolic panel was reviewed, potassium normal at 3 8  BUN of 28 is mildly elevated, creatinine of 1 4 0 is normal   No renal failure  Lactic acid normal 1 3  INR is normal at 0 99  CT scan of the abdomen pelvis images visualized by me  Radiology report reviewed:ABDOMEN    LOWER CHEST:  Scattered tree-in-bud and centrilobular nodules at the lung bases, similar to prior, suggestive of chronic infectious/inflammatory process   Partially imaged bronchiectasis with peribronchial thickening in the right middle lobe and lingula     No pleural effusion   Normal cardiac size  LIVER/BILIARY TREE:  Normal liver size and configuration   No focal mass   Mild periportal edema, nonspecific  GALLBLADDER:  No calcified gallstones  No pericholecystic inflammatory change  SPLEEN:  Unremarkable  PANCREAS:  Stable cystic pancreatic lesion in the pancreatic head measuring 1 8 x 1 2 cm and demonstrating internal septation (series 2 image 30)   No main pancreatic ductal dilation  ADRENAL GLANDS:  Stable nodular thickening of the adrenal glands   Stable left adrenal nodule measuring 1 6 x 1 2 cm (series 2 image 21)  KIDNEYS/URETERS:  Stable bilateral renal sinus cysts   Also noted are additional subcentimeter hypoattenuating cortical lesions that are too small to characterize, statistically most likely benign   No hydronephrosis      STOMACH AND BOWEL:  The patient appears to have undergone prior right hemicolectomy   Anastomotic staple lines are noted in the proximal transverse colon in the right upper quadrant   There are multiple dilated loops of mid to distal small bowel   measuring up to 4 cm in diameter with transition point in the right lower quadrant (series 2 image 55, series 603 image 51)   A fecalization loop of small bowel (small bowel feces sign) is seen just proximal to the transition point (series 2 image 53)     The overall findings are in keeping with high-grade small bowel obstruction, likely on the basis of adhesions  APPENDIX:  Not visualized, likely surgically absent  ABDOMINOPELVIC CAVITY: Devere Ernestina is trace interloop free fluid   No free intraperitoneal air  No lymphadenopathy  VESSELS:  Atherosclerotic changes are present   No evidence of aneurysm  PELVIS    REPRODUCTIVE ORGANS:  Status post hysterectomy   No suspicious adnexal mass  URINARY BLADDER:  Unremarkable  ABDOMINAL WALL/INGUINAL REGIONS:  Paucity of the subcutaneous fat in the anterior abdominal wall  OSSEOUS STRUCTURES:  No acute fracture or destructive osseous lesion   Degenerative changes of the lumbar spine  I discussed all test results with the patient at bedside  Case discussed with Dr Shela Gilford, general surgery regarding admission - he recommended discuss admission with [facility],  Case discussed with DR Cotton regarding admission  General surgery consulted           Amount and/or Complexity of Data Reviewed  Clinical lab tests: ordered and reviewed  Tests in the radiology section of CPT®: ordered and reviewed  Discussion of test results with the performing providers: yes  Obtain history from someone other than the patient: yes (EMs)  Review and summarize past medical records: yes  Independent visualization of images, tracings, or specimens: yes        Disposition  Final diagnoses:   Small bowel obstruction ([address] )     Time reflects when diagnosis was documented in both MDM as applicable and the Disposition within this note     Time User Action Codes Description Comment    11/18/2019  1:27 PM Get Najera Add [Y06 [id number]] Small bowel obstruction ([facility] [address] )     11/18/2019  3:27 PM Sirena Roger Add [F02 [id number]] Dementia associated with other underlying disease without [facility] disturbance [facility] )     11/18/2019  3:50 PM Sirena Roger Add [E44 [id number]] Moderate protein-calorie malnutrition [facility])       ED Disposition     ED Disposition Condition Date/Time Comment    Admit Stable Mon Nov 18, 2019  1:57 PM Case was discussed with Dr Renetta Anthony and the patient's admission status was agreed to be Admission Status: inpatient status to the service of Dr Renetta Anthony   Follow-up Information    None         Patient's Medications   Discharge Prescriptions    No medications on file     No discharge procedures on file      ED Provider  Electronically Signed by           Debi Gaston PA-C  11/18/19 8976

## 2019-11-18 NOTE — ASSESSMENT & PLAN NOTE
Malnutrition Findings:           BMI Findings: Body mass index is 14 52 kg/m²  Patient with a significantly low BMI, nutrition consulted  Low albumin, suspect chronic malnutrition secondary to age and dementia and inadequate caloric intake  She has bitemporal wasting, prominent clavicles and ribs, prominent spinous processes, fat and muscle wasting

## 2019-11-19 ENCOUNTER — APPOINTMENT (INPATIENT)
Dept: RADIOLOGY | Facility: HOSPITAL | Age: 84
DRG: 388 | End: 2019-11-19
Payer: MEDICARE

## 2019-11-19 LAB
ALBUMIN SERPL BCP-MCNC: 2.7 G/DL (ref 3.5–5)
ALP SERPL-CCNC: 47 U/L (ref 46–116)
ALT SERPL W P-5'-P-CCNC: <6 U/L (ref 12–78)
ANION GAP SERPL CALCULATED.3IONS-SCNC: 13 MMOL/L (ref 4–13)
AST SERPL W P-5'-P-CCNC: 15 U/L (ref 5–45)
BASOPHILS # BLD AUTO: 0.04 THOUSANDS/ΜL (ref 0–0.1)
BASOPHILS NFR BLD AUTO: 1 % (ref 0–1)
BILIRUB SERPL-MCNC: 0.8 MG/DL (ref 0.2–1)
BUN SERPL-MCNC: 21 MG/DL (ref 5–25)
CALCIUM SERPL-MCNC: 8.1 MG/DL (ref 8.3–10.1)
CHLORIDE SERPL-SCNC: 105 MMOL/L (ref 100–108)
CO2 SERPL-SCNC: 24 MMOL/L (ref 21–32)
CREAT SERPL-MCNC: 0.77 MG/DL (ref 0.6–1.3)
EOSINOPHIL # BLD AUTO: 0.11 THOUSAND/ΜL (ref 0–0.61)
EOSINOPHIL NFR BLD AUTO: 2 % (ref 0–6)
ERYTHROCYTE [DISTWIDTH] IN BLOOD BY AUTOMATED COUNT: 14.9 % (ref 11.6–15.1)
GFR SERPL CREATININE-BSD FRML MDRD: 69 ML/MIN/1.73SQ M
GLUCOSE SERPL-MCNC: 67 MG/DL (ref 65–140)
HCT VFR BLD AUTO: 41.8 % (ref 34.8–46.1)
HGB BLD-MCNC: 13 G/DL (ref 11.5–15.4)
IMM GRANULOCYTES # BLD AUTO: 0.01 THOUSAND/UL (ref 0–0.2)
IMM GRANULOCYTES NFR BLD AUTO: 0 % (ref 0–2)
LYMPHOCYTES # BLD AUTO: 0.99 THOUSANDS/ΜL (ref 0.6–4.47)
LYMPHOCYTES NFR BLD AUTO: 17 % (ref 14–44)
MAGNESIUM SERPL-MCNC: 1.5 MG/DL (ref 1.6–2.6)
MCH RBC QN AUTO: 31.5 PG (ref 26.8–34.3)
MCHC RBC AUTO-ENTMCNC: 31.1 G/DL (ref 31.4–37.4)
MCV RBC AUTO: 101 FL (ref 82–98)
MONOCYTES # BLD AUTO: 0.99 THOUSAND/ΜL (ref 0.17–1.22)
MONOCYTES NFR BLD AUTO: 17 % (ref 4–12)
NEUTROPHILS # BLD AUTO: 3.74 THOUSANDS/ΜL (ref 1.85–7.62)
NEUTS SEG NFR BLD AUTO: 63 % (ref 43–75)
NRBC BLD AUTO-RTO: 0 /100 WBCS
PHOSPHATE SERPL-MCNC: 3.8 MG/DL (ref 2.3–4.1)
PLATELET # BLD AUTO: 209 THOUSANDS/UL (ref 149–390)
PMV BLD AUTO: 10.2 FL (ref 8.9–12.7)
POTASSIUM SERPL-SCNC: 3.3 MMOL/L (ref 3.5–5.3)
PROT SERPL-MCNC: 6 G/DL (ref 6.4–8.2)
RBC # BLD AUTO: 4.13 MILLION/UL (ref 3.81–5.12)
SODIUM SERPL-SCNC: 142 MMOL/L (ref 136–145)
WBC # BLD AUTO: 5.88 THOUSAND/UL (ref 4.31–10.16)

## 2019-11-19 PROCEDURE — 99233 SBSQ HOSP IP/OBS HIGH 50: CPT | Performed by: SURGERY

## 2019-11-19 PROCEDURE — G8979 MOBILITY GOAL STATUS: HCPCS

## 2019-11-19 PROCEDURE — 84100 ASSAY OF PHOSPHORUS: CPT | Performed by: PHYSICIAN ASSISTANT

## 2019-11-19 PROCEDURE — 97163 PT EVAL HIGH COMPLEX 45 MIN: CPT

## 2019-11-19 PROCEDURE — 71045 X-RAY EXAM CHEST 1 VIEW: CPT

## 2019-11-19 PROCEDURE — G8978 MOBILITY CURRENT STATUS: HCPCS

## 2019-11-19 PROCEDURE — 85025 COMPLETE CBC W/AUTO DIFF WBC: CPT | Performed by: PHYSICIAN ASSISTANT

## 2019-11-19 PROCEDURE — 80053 COMPREHEN METABOLIC PANEL: CPT | Performed by: PHYSICIAN ASSISTANT

## 2019-11-19 PROCEDURE — 83735 ASSAY OF MAGNESIUM: CPT | Performed by: PHYSICIAN ASSISTANT

## 2019-11-19 PROCEDURE — 99232 SBSQ HOSP IP/OBS MODERATE 35: CPT | Performed by: STUDENT IN AN ORGANIZED HEALTH CARE EDUCATION/TRAINING PROGRAM

## 2019-11-19 RX ORDER — METOPROLOL TARTRATE 5 MG/5ML
5 INJECTION INTRAVENOUS EVERY 8 HOURS PRN
Status: DISCONTINUED | OUTPATIENT
Start: 2019-11-19 | End: 2019-11-23 | Stop reason: HOSPADM

## 2019-11-19 RX ORDER — LIDOCAINE HYDROCHLORIDE 20 MG/ML
10 SOLUTION OROPHARYNGEAL 3 TIMES DAILY PRN
Status: DISCONTINUED | OUTPATIENT
Start: 2019-11-19 | End: 2019-11-23 | Stop reason: HOSPADM

## 2019-11-19 RX ORDER — DEXTROSE, SODIUM CHLORIDE, AND POTASSIUM CHLORIDE 5; .45; .15 G/100ML; G/100ML; G/100ML
75 INJECTION INTRAVENOUS CONTINUOUS
Status: DISCONTINUED | OUTPATIENT
Start: 2019-11-19 | End: 2019-11-22

## 2019-11-19 RX ORDER — AMLODIPINE BESYLATE 5 MG/1
5 TABLET ORAL DAILY
Status: DISCONTINUED | OUTPATIENT
Start: 2019-11-19 | End: 2019-11-19

## 2019-11-19 RX ORDER — POTASSIUM CHLORIDE 14.9 MG/ML
20 INJECTION INTRAVENOUS
Status: COMPLETED | OUTPATIENT
Start: 2019-11-19 | End: 2019-11-19

## 2019-11-19 RX ORDER — LIDOCAINE HYDROCHLORIDE 20 MG/ML
JELLY TOPICAL ONCE
Status: DISCONTINUED | OUTPATIENT
Start: 2019-11-19 | End: 2019-11-19

## 2019-11-19 RX ORDER — POTASSIUM CHLORIDE 14.9 MG/ML
20 INJECTION INTRAVENOUS
Status: DISCONTINUED | OUTPATIENT
Start: 2019-11-19 | End: 2019-11-19

## 2019-11-19 RX ORDER — MAGNESIUM SULFATE HEPTAHYDRATE 40 MG/ML
2 INJECTION, SOLUTION INTRAVENOUS ONCE
Status: COMPLETED | OUTPATIENT
Start: 2019-11-19 | End: 2019-11-19

## 2019-11-19 RX ORDER — LIDOCAINE HYDROCHLORIDE 20 MG/ML
10 SOLUTION OROPHARYNGEAL 3 TIMES DAILY PRN
Status: DISCONTINUED | OUTPATIENT
Start: 2019-11-19 | End: 2019-11-19

## 2019-11-19 RX ADMIN — MAGNESIUM SULFATE HEPTAHYDRATE 2 G: 40 INJECTION, SOLUTION INTRAVENOUS at 12:18

## 2019-11-19 RX ADMIN — HEPARIN SODIUM 5000 UNITS: 5000 INJECTION INTRAVENOUS; SUBCUTANEOUS at 22:33

## 2019-11-19 RX ADMIN — POTASSIUM CHLORIDE 20 MEQ: 200 INJECTION, SOLUTION INTRAVENOUS at 18:06

## 2019-11-19 RX ADMIN — HEPARIN SODIUM 5000 UNITS: 5000 INJECTION INTRAVENOUS; SUBCUTANEOUS at 15:00

## 2019-11-19 RX ADMIN — Medication 1 SPRAY: at 17:15

## 2019-11-19 RX ADMIN — POTASSIUM CHLORIDE 20 MEQ: 200 INJECTION, SOLUTION INTRAVENOUS at 15:01

## 2019-11-19 RX ADMIN — HEPARIN SODIUM 5000 UNITS: 5000 INJECTION INTRAVENOUS; SUBCUTANEOUS at 05:36

## 2019-11-19 RX ADMIN — DEXTROSE, SODIUM CHLORIDE, AND POTASSIUM CHLORIDE 75 ML/HR: 5; .45; .15 INJECTION INTRAVENOUS at 22:33

## 2019-11-19 NOTE — MALNUTRITION/BMI
This medical record reflects one or more clinical indicators suggestive of malnutrition and/or morbid obesity  Malnutrition Findings:   Malnutrition type: Chronic illness  Degree of Malnutrition: Other severe protein calorie malnutrition  Malnutrition Characteristics: Fat loss, Muscle loss, Inadequate energy    Malnutrition related to inadequate energy intake as evidenced by subcutaneous fat loss orbital/cheek region and all extremities, clavicle protruding, temporal wasting, <75% energy intake compared to estimated needs >1 month  Recommend strawberry or chocolate ensure supplements when patient is medically stable for diet advancement  BMI Findings:  BMI Classifications: Underweight < 18 5     Body mass index is 15 18 kg/m²  See Nutrition note dated 11/19/2019 for additional details  Completed nutrition assessment is viewable in the nutrition documentation

## 2019-11-19 NOTE — ASSESSMENT & PLAN NOTE
Malnutrition Findings:           BMI Findings: Body mass index is 15 18 kg/m²  Patient with a significantly low BMI, nutrition consulted  Low albumin, suspect chronic malnutrition secondary to age and dementia and inadequate caloric intake  She has bitemporal wasting, prominent clavicles and ribs, prominent spinous processes, fat and muscle wasting

## 2019-11-19 NOTE — SOCIAL WORK
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  She said that she works 3x weekly  She stated that pt has difficulty hearing  She also said that pt has confusion at times  She said that this confusion has progressed for the last 6 months  She said that pt does not know how to answer the phone so she gets nervous when she is not around  She stated that she is looking into life alert  Juanpablo Mckee assists with all ADLs  She reported that pt has had an appetite and was eating well  Pt ambulates with a walker  Pt has hx of [facility]  Pt has hx at [facility] for [facility]  Juanpablo Mckee assists with medications  She reported that sometimes pt palms her meds and Juanpablo Mckee will find the med on the floor  She said that she has to make sure that her dog does not eat the pills  She stated that pt only takes 4 meds  Pt uses [facility] in [address] [place] [address] has [employer] for prescription coverage  She said that she takes pt to all of her doctor appts  She said that pt follows up with her PCP Dr Feliz Turcios  She reported no hx, dx or tx of MH  She reported that pt does not have a POA or LW  During this time, CM discussed setting up care team meeting to discuss goals of care with Palliative Care  Care team meeting scheduled on 11/20/19 at 2pm  Dr Tracie Fountain with slim, Dr Andrew Cardona with Palliative Care and RN made aware

## 2019-11-19 NOTE — ASSESSMENT & PLAN NOTE
· On admission, patient presents approximately 2 days of abdominal pain with associated nausea and vomiting  CT scan is revealing a high-grade small-bowel obstruction, possibly secondary to adhesions  · On my encounter patient appears comfortable  Denies episodes of nausea or vomiting  Still complaining of some abdominal discomfort and poor appetite  Denies flatus  Bowel sounds positive on exam   · Continue with NPO  · Continue IV fluid hydration  · Electrolytes are supplemented  · Nutrition consult secondary to malnutrition  · Surgery recommendation noted for conservative management  · If not improving or worsening will consider follow-up with palliative care consult given patient's overall risk with surgical intervention  · Incentive spirometry  · Fall precaution  · Out of bed to chair and ambulate with assistant as much as tolerated  · Daughter present at bedside and she is agreeable with above plan

## 2019-11-19 NOTE — PLAN OF CARE
Problem: PHYSICAL THERAPY ADULT  Goal: Performs mobility at highest level of function for planned discharge setting  See evaluation for individualized goals  Description  Treatment/Interventions: Functional transfer training, LE strengthening/ROM, Therapeutic exercise, Elevations, Endurance training, Patient/family training, Equipment eval/education, Bed mobility, Gait training, Spoke to nursing          See flowsheet documentation for full assessment, interventions and recommendations  Note:   Prognosis: Good  Problem List: Decreased strength, Decreased endurance, Impaired balance, Decreased mobility, Decreased cognition, Decreased safety awareness, Impaired hearing, Impaired vision  Assessment: Pt is 80 y o  female seen for PT evaluation s/p admit to [facility] on 2019 w/ Small bowel obstruction ([facility] [address] )  PT consulted to assess pt's functional mobility and d/c needs  Order placed for PT eval and tx, w/ up w/ A order  Performed at least 2 patient identifiers during session: Name and   Comorbidities affecting pt's physical performance at time of assessment include: dementia, hypertension, malnutrition, history of TB, urinary incontinence, hearing loss  Given patient's baseline cognitive impairments, patient unreliable/poor historian  PTA, pt was independent w/ all functional mobility w/ RW, ambulates household distances, has 1 JORGE, lives w/ daughter in two level house with 1st floor set-up and retired  Personal factors affecting pt at time of IE include: ambulating w/ assistive device, stairs to enter home, inability to ambulate household distances, inability to navigate level surfaces w/o external assistance, decreased cognition, limited home support, positive fall history, hearing impairments, visual impairments, limited insight into impairments, inability to perform IADLs, inability to perform ADLs and inability to live alone   Please find objective findings from PT assessment regarding body systems outlined above with impairments and limitations including weakness, impaired balance, decreased endurance, gait deviations, decreased activity tolerance, decreased functional mobility tolerance, decreased safety awareness, fall risk and decreased cognition  The following objective measures performed on IE also reveal limitations: Barthel Index: 45/100 and Modified Khadar: 4 (moderate/severe disability)  Pt's clinical presentation is currently unstable/unpredictable seen in pt's presentation of ongoing medical management/monitoring, evident in need for assist w/ all phases of mobility when usually mobilizing independently, and need for input for mobility technique/safety  Pt to benefit from continued PT tx to address deficits as defined above and maximize level of functional independent mobility and consistency  From PT/mobility standpoint, recommendation at time of d/c would be STR pending progress in order to facilitate return to PLOF  Barriers to Discharge: Decreased caregiver support     Recommendation: Short-term skilled PT     PT - OK to Discharge: Yes(when medically cleared; if to STR)    See flowsheet documentation for full assessment

## 2019-11-19 NOTE — PROGRESS NOTES
Progress Note Chema Jocelyne [birth year], 80 y o  female MRN: [id number]    Unit/Bed#: -01 Encounter: [id number]    Primary Care Provider: Feliz Turcios MD   Date and time admitted to hospital: 11/18/2019 10:20 AM        * Small bowel obstruction ([facility] )  Assessment & Plan  · On admission, patient presents approximately 2 days of abdominal pain with associated nausea and vomiting  CT scan is revealing a high-grade small-bowel obstruction, possibly secondary to adhesions  · On my encounter patient appears comfortable  Denies episodes of nausea or vomiting  Still complaining of some abdominal discomfort and poor appetite  Denies flatus  Bowel sounds positive on exam   · Continue with NPO  · Continue IV fluid hydration  · Electrolytes are supplemented  · Nutrition consult secondary to malnutrition  · Surgery recommendation noted for conservative management  · If not improving or worsening will consider follow-up with palliative care consult given patient's overall risk with surgical intervention  · Incentive spirometry  · Fall precaution  · Out of bed to chair and ambulate with assistant as much as tolerated  · Daughter present at bedside and she is agreeable with above plan  Malnutrition ([facility] )  Assessment & Plan  Malnutrition Findings:           BMI Findings: Body mass index is 15 18 kg/m²  Patient with a significantly low BMI, nutrition consulted  Low albumin, suspect chronic malnutrition secondary to age and dementia and inadequate caloric intake  She has bitemporal wasting, prominent clavicles and ribs, prominent spinous processes, fat and muscle wasting  Hypertension  Assessment & Plan  · Hold home medications pending verification  · Will provide IV medications as needed  Dementia [facility])  Assessment & Plan  · Patient does have dementia, daughter reports she is pretty forgetful at times at home  · Dementia precautions, reoriented as needed    · Holding donepezil and sertraline for now given NPO  VTE Pharmacologic Prophylaxis:   Pharmacologic: Heparin    Patient Centered Rounds: I have performed bedside rounds with nursing staff today  Discussions with Specialists or Other Care Team Provider:  Surgery recommendation noted  Education and Discussions with Family / Patient:  Patient, daughter present at bedside  Time Spent for Care: 30 minutes  More than 50% of total time spent on counseling and coordination of care as described above  Current Length of Stay: 1 day(s)    Current Patient Status: Inpatient   Certification Statement: The patient will continue to require additional inpatient hospital stay due to Small bowel obstruction  Discharge Plan: tbd    Code Status: Level 1 - Full Code      Subjective:   Afebrile, hemodynamically stable  Appears comfortable  Awake, alert, oriented baseline  Denies abdominal pain has improved significantly  Denies flatus  Bowel sounds present  Denies nausea, vomiting  Denies chest pain, fever, chills, any other new complaints  Objective:     Vitals:   Temp (24hrs), Av 2 °F (36 8 °C), Min:98 1 °F (36 7 °C), Max:98 4 °F (36 9 °C)    Temp:  [98 1 °F (36 7 °C)-98 4 °F (36 9 °C)] 98 1 °F (36 7 °C)  HR:  [74-98] 74  Resp:  [16-20] 19  BP: (107-188)/(46-79) 160/63  SpO2:  [90 %-96 %] 96 %  Body mass index is 15 18 kg/m²  Input and Output Summary (last 24 hours): Intake/Output Summary (Last 24 hours) at 2019 1634  Last data filed at 2019 1442  Gross per 24 hour   Intake 0 ml   Output 200 ml   Net -200 ml       Physical Exam:     Physical Exam   Constitutional: No distress  Elderly, frail, cachectic female patient not in acute distress  HENT:   Head: Normocephalic and atraumatic  Eyes: Pupils are equal, round, and reactive to light  EOM are normal    Neck: Normal range of motion  Neck supple  Cardiovascular: Normal rate and regular rhythm     Pulmonary/Chest: Effort normal and breath sounds normal  No stridor  No respiratory distress  Abdominal: Soft  Bowel sounds are normal  She exhibits no distension  There is no tenderness  There is no guarding  Musculoskeletal: Normal range of motion  Neurological:   Awake, alert, oriented baseline  Cooperative to exam    Skin: She is not diaphoretic  Psychiatric: Her behavior is normal        Additional Data:     Labs:    Results from last 7 days   Lab Units 11/19/19  0525   WBC Thousand/uL 5 88   HEMOGLOBIN g/dL 13 0   HEMATOCRIT % 41 8   PLATELETS Thousands/uL 209   NEUTROS PCT % 63   LYMPHS PCT % 17   MONOS PCT % 17*   EOS PCT % 2     Results from last 7 days   Lab Units 11/19/19  0525   SODIUM mmol/L 142   POTASSIUM mmol/L 3 3*   CHLORIDE mmol/L 105   CO2 mmol/L 24   BUN mg/dL 21   CREATININE mg/dL 0 77   ANION GAP mmol/L 13   CALCIUM mg/dL 8 1*   ALBUMIN g/dL 2 7*   TOTAL BILIRUBIN mg/dL 0 80   ALK PHOS U/L 47   ALT U/L <6*   AST U/L 15   GLUCOSE RANDOM mg/dL 67     Results from last 7 days   Lab Units 11/18/19  1040   INR  0 99             Results from last 7 days   Lab Units 11/18/19  1118   LACTIC ACID mmol/L 1 3           * I Have Reviewed All Lab Data Listed Above  * Additional Pertinent Lab Tests Reviewed:  All Labs Within Last 24 Hours Reviewed      Recent Cultures (last 7 days):           Last 24 Hours Medication List:     Current Facility-Administered Medications:  acetaminophen 650 mg Oral Q6H PRN Suly Jacome PA-C    aluminum-magnesium hydroxide-simethicone 30 mL Oral Q6H PRN Leanne Roger PA-C    dextrose 5 % and sodium chloride 0 45 % with KCl 20 mEq/L 75 mL/hr Intravenous Continuous Abhishek Cabral PA-C    heparin (porcine) 5,000 Units Subcutaneous [facility] Leanne Roger PA-C    hydrALAZINE 10 mg Intravenous Q6H PRN Leanne Roger PA-C    Lidocaine Viscous HCl 10 mL Swish & Swallow TID PRN Abhishek Cabral PA-C    metoprolol 5 mg Intravenous Q8H PRN Sharyle Rote, MD    nicotine 1 patch Transdermal Daily Suly Jacome PA-C ondansetron 4 mg Intravenous Q6H PRN An Yin PA-C    phenol 1 spray Mouth/Throat Q2H PRN Doug Rhodes PA-C    potassium chloride 20 mEq Intravenous Q2H Leobardo LOREDO MD Last Rate: 20 mEq (11/19/19 1501)        Today, Patient Was Seen By: Stevenson Negron MD    ** Please Note: Dictation voice to text software may have been used in the creation of this document   **

## 2019-11-19 NOTE — PROGRESS NOTES
Progress Note - General Surgery   Sheba Gil 80 y o  female MRN: [id number]  Unit/Bed#: -01 Encounter: [id number]    Assessment/Plan  Pt seen with DR Zambrano at 18 with surgery team      SBO high grade with transition in RLQ, likely due to adhesive disease  H/o "removed tumor in my intestines years ago, evidence of right hemicolectomy on CT  and appendectomy  Pain has resolved  She is belching and not passing gas, no bowel movement  She is not vomiting  No NGT on admission indicated  Sitting in chair ans looks comfortable, abdomen is soft, NBS   -cont NPO ,ok to add some ice chips  -cont IVF   -cont to monitor abd and blood work      Hypokalemia  Dementia  Malnutrition  HTN  -management per SLIM      Chief Complaint: I don't have the same pain that I did   i'm not vomiting or nauseous, just thirsty,  i'm belching and not passing gas yet, no bowel movement  But overall I feel better  Objective/Exam:  Sitting in chair at time of exam    General Appearance:    Alert and orientated x 3, cooperative, no distress   Lungs:     Clear to auscultation bilaterally, respirations unlabored    Heart:    Regular rate and rhythm   Abdomen:     Soft, cachetic, incision well healed  No hernia  NT, NBS, no distention         Extremities:   Extremities normal,  no cyanosis or edema   Pulses:   2+ and symmetric all extremities, no calf tenderness   Skin:   Skin color, texture, turgor normal, no rashes or lesions   Neurologic:   CNII-XII intact, normal strength, sensation and reflexes     Throughout, affect appropriate       Blood pressure 141/53, pulse 84, temperature 98 2 °F (36 8 °C), resp  rate 16, height 5' 2" (1 575 m), weight 37 8 kg (83 lb 5 3 oz), SpO2 96 %  ,Body mass index is 15 24 kg/m²        Intake/Output Summary (Last 24 hours) at 11/19/2019 1003  Last data filed at 11/19/2019 0700  Gross per 24 hour   Intake 1000 ml   Output 100 ml   Net 900 ml       Invasive Devices     Peripheral Intravenous Line Peripheral IV 11/18/19 Right Antecubital less than 1 day    Peripheral IV 11/18/19 Right Arm less than 1 day                                        Labs:   CBC with diff:   Lab Results   Component Value Date    WBC 5 88 11/19/2019    HGB 13 0 11/19/2019    HCT 41 8 11/19/2019     (H) 11/19/2019     11/19/2019    MCH 31 5 11/19/2019    MCHC 31 1 (L) 11/19/2019    RDW 14 9 11/19/2019    MPV 10 2 11/19/2019    NRBC 0 11/19/2019   ,   BMP/CMP:  Lab Results   Component Value Date    K 3 3 (L) 11/19/2019     11/19/2019    CO2 24 11/19/2019    BUN 21 11/19/2019    CREATININE 0 77 11/19/2019    CALCIUM 8 1 (L) 11/19/2019    AST 15 11/19/2019    ALT <6 (L) 11/19/2019    ALKPHOS 47 11/19/2019    EGFR 69 11/19/2019   ,   Lipid Panel: No results found for: CHOL,   Coags:   Lab Results   Component Value Date    PTT 25 11/18/2019    INR 0 99 11/18/2019   ,     Blood Culture:   Lab Results   Component Value Date    BLOODCX No Growth After 5 Days  05/06/2019    BLOODCX No Growth After 5 Days  05/06/2019   ,   Urinalysis:   Lab Results   Component Value Date    COLORU Yellow 11/18/2019    COLORU Yellow 12/07/2016    CLARITYU Clear 11/18/2019    CLARITYU Transparent 12/07/2016    SPECGRAV 1 010 11/18/2019    SPECGRAV 1 020 12/07/2016    PHUR 6 0 11/18/2019    PHUR 6 0 10/31/2018    PHUR 6 0 12/07/2016    LEUKOCYTESUR Negative 11/18/2019    LEUKOCYTESUR neg 12/07/2016    NITRITE Negative 11/18/2019    NITRITE neg 12/07/2016    PROTEINUA neg 12/07/2016    GLUCOSEU Negative 11/18/2019    KETONESU Trace (A) 11/18/2019    KETONESU neg 12/07/2016    BILIRUBINUR Negative 11/18/2019    BILIRUBINUR neg 12/07/2016    BLOODU Trace-Intact (A) 11/18/2019    BLOODU neg 12/07/2016   ,   Urine Culture: No results found for: URINECX,   Wound Culure: No results found for: WOUNDCULT      Imaging: Ct Abdomen Pelvis With Contrast    Result Date: 11/18/2019  Impression: 1    Findings in keeping with high-grade small bowel obstruction with transition point in the right lower quadrant, likely on the basis of adhesions  2   Tree-in-bud and centrilobular nodules at the lung bases as well as partially imaged bronchiectasis in the right middle lobe and the lingula, similar to prior exams  The findings are likely on the basis of chronic infectious/inflammatory processes such as mycobacterial infection  3   Stable 1 8 cm cystic pancreatic head lesion with internal septation  4   Stable 1 6 cm left adrenal nodule  The study was marked in [facility] for immediate notification   Workstation performed: Ariana Adler PA-C  11/19/2019

## 2019-11-19 NOTE — ED NOTES
1  CC  vomiting  2  Orientation status  Intermittent confusion   3  Abnormal labs/vitals/assessment  + small bowel obstruction   4  Medications/drips  PRN Hydralazine 10mg @ 1756  5  Last time narcotics given  n/a  6  IV/drains/etc   #20G R AC  #22G R FA  7  Isolation status  n/a  8  Ambulation status  Assist x1  9  Skin  Intact  10  ED phone number  [phone]  11  [address], RN  13  11/18/19 2054  14        Reji Robles RN  11/18/19 9124

## 2019-11-19 NOTE — PHYSICAL THERAPY NOTE
Physical Therapy Evaluation     Patient's Name: Lila Pollack    Admitting Diagnosis  Vomiting [R11 10]  Small bowel obstruction ([facility] [address] ) [I01 674]  Mod[facility] protein-calorie malnutrition ([facility] [E44 0]  Dementia associated with other underlying disease without behavioral disturbance ([facility] ) [F02 80]    Problem List  Patient Active Problem List   Diagnosis    Diarrhea    Tobacco dependence    Hemoptysis    Dementia ([facility]    Hypertension    History of TB (tuberculosis)    Malnutrition ([facility] [address] )    Hyperkalemia    Lung mass    Change in bowel habits    Depression    Hearing loss    Osteoarthritis of knee    Urinary incontinence    Vitamin D deficiency    Small bowel obstruction ([facility] [address] )       Past Medical History  Past Medical History:   Diagnosis Date    Current smoker     Dementia [facility]     History of tuberculosis 40 years ago treated     Hypertension     Ovarian cyst        Past Surgical History  Past Surgical History:   Procedure Laterality Date    APPENDECTOMY      CATARACT EXTRACTION      COLON SURGERY Right     COLONOSCOPY      COLONOSCOPY N/A 10/5/2017    Procedure: COLONOSCOPY;  Surgeon: Kaila Sellers MD;  Location: [facility]; Service: Gastroenterology    HYSTERECTOMY      OVARIAN CYST REMOVAL            11/19/19 8394   Note Type   Note type Eval only   Pain Assessment   Pain Assessment No/denies pain   Pain Score No Pain   Home Living   Type of Home House   Home Layout Two level;Performs ADLs on one level; Able to live on main level with bedroom/bathroom  (1 JORGE)   Bathroom Shower/Tub Walk-in shower   Bathroom Toilet Standard   Bathroom Equipment [facility];Cane  (RW)   Additional Comments Given patient's baseline cognitive impairments, patient unreliable/poor historian  Prior Function   Level of [place] Independent with ADLs and functional mobility; Needs assistance with IADLs   Lives With Daughter  (works during the day; patient home alone during this time)   Cam   IADLs Needs assistance   Falls in the last 6 months 1 to 4   Vocational Retired   Comments patient does not drive   Restrictions/Precautions   Wells Jonn Bearing Precautions Per Order No   Braces or Orthoses Other (Comment)  (none per chart review)   Other Precautions Bed Alarm; Chair Alarm; Fall Risk;Multiple lines;Cognitive;Hard of hearing;Visual impairment  (patient reports glasses are not available upon admission)   General   Family/Caregiver Present No   Cognition   Overall Cognitive Status Impaired   Arousal/Participation Cooperative   Orientation Level Oriented to person;Disoriented to place; Disoriented to time;Disoriented to situation   Memory Decreased recall of recent events;Decreased short term memory   Following Commands Follows one step commands without difficulty   Comments pt agreeable to PT eval   RUE Assessment   RUE Assessment WFL  (4-/5)   LUE Assessment   LUE Assessment WFL  (4-/5)   RLE Assessment   RLE Assessment WFL  (grossly assessed with functional mobility: at least 3+/5)   LLE Assessment   LLE Assessment WFL  (grossly assessed with functional mobility: at least 3+/5)   Coordination   Movements are Fluid and Coordinated 1   Sensation [facility]   Light Touch   RLE Light Touch Grossly intact   LLE Light Touch Grossly intact   Bed Mobility   Rolling R 4  Minimal assistance   Additional items Assist x 1; Increased time required;Verbal cues;LE management; Bedrails;HOB elevated   Supine to Sit 4  Minimal assistance   Additional items Assist x 1;Verbal cues; Increased time required;LE management;HOB elevated; Bedrails   Transfers   Sit to Stand 4  Minimal assistance   Additional items Assist x 1; Increased time required; Impulsive;Verbal cues   Stand to Sit 4  Minimal assistance   Additional items Assist x 1; Increased time required;Verbal cues;Armrests   Ambulation/Elevation   Gait pattern Short stride; Excessively slow;Decreased foot clearance   Gait Assistance 4  Minimal assist   Additional items Assist x 1;Verbal cues   Assistive Device Rolling walker   Distance 3' bed>chair   Stair Management Assistance Not tested   Balance   Static Sitting Good   Dynamic Sitting Fair +   Static Standing Fair   Dynamic Standing Fair -   Ambulatory Poor +   Endurance Deficit   Endurance Deficit Yes   Activity Tolerance   Activity Tolerance Patient limited by fatigue   Medical Staff Made Aware SPT Jerri Nolen   Nurse Made Aware RN Raymond Diane confirmed patient appropriate for therapy; made aware of session outcomes; post-session: patient sitting OOB in recliner chair, all needs within reach and chair alarm engaged   Assessment   Prognosis Good   Problem List Decreased strength;Decreased endurance; Impaired balance;Decreased mobility; Decreased cognition;Decreased safety awareness; Impaired hearing; Impaired vision   Assessment Pt is 80 y o  female seen for PT evaluation [facility] ([facility] [address] )  PT consulted to assess pt's functional mobility and d/c needs  Order placed for PT eval and tx, w/ up w/ A order  Performed at least 2 patient identifiers during session: Name and   Comorbidities affecting pt's physical performance at time of assessment include: dementia, hypertension, malnutrition, history of TB, urinary incontinence, hearing loss  Given patient's baseline cognitive impairments, patient unreliable/poor historian  PTA, pt was independent w/ all functional mobility w/ RW, ambulates household distances, has 1 JORGE, lives w/ daughter in two level house with 1st floor set-up and retired   Personal factors affecting pt at time of IE include: ambulating w/ assistive device, stairs to enter home, inability to ambulate household distances, inability to navigate level surfaces w/o external assistance, decreased cognition, limited home support, positive fall history, hearing impairments, visual impairments, limited insight into impairments, inability to perform IADLs, inability to perform ADLs and inability to live alone  Please find objective findings from PT assessment regarding body systems outlined above with impairments and limitations including weakness, impaired balance, decreased endurance, gait deviations, decreased activity tolerance, decreased functional mobility tolerance, decreased safety awareness, fall risk and decreased cognition  The following objective measures performed on IE also reveal limitations: Barthel Index: 45/100 and Modified [place]: 4 (moderate/severe disability)  Pt's clinical presentation is currently unstable/unpredictable seen in pt's presentation of ongoing medical management/monitoring, evident in need for assist w/ all phases of mobility when usually mobilizing independently, and need for input for mobility technique/safety  Pt to benefit from continued PT tx to address deficits as defined above and maximize level of functional independent mobility and consistency  From PT/mobility standpoint, recommendation at time of d/c would be STR pending progress in order to facilitate return to PLOF     Barriers to Discharge Decreased caregiver support   Goals   Patient Goals "to have something to drink"   [facility] Expiration Date 11/29/19   Short Term Goal #1 In 7-10 days: Increase bilateral LE strength 1/2 grade to facilitate independent mobility, Perform all bed mobility tasks with distant S to decrease caregiver burden, Perform all transfers with distant S to improve independence, Ambulate > 75 ft  x 2 trials with RW with distant S w/o LOB and w/ normalized gait pattern 100% of the time, Navigate 1 stair with distant S with unilateral handrail to facilitate return to previous living environment, Increase all balance 1 grade to decrease risk for falls and Tolerate 4 hr OOB to faciliate upright tolerance   PT Treatment Day 0   Plan   Treatment/Interventions Functional transfer training;LE strengthening/ROM; Therapeutic exercise;Elevations; Endurance training;Patient/family training;Equipment eval/education; Bed mobility;Gait training;Spoke to nursing   PT Frequency Other (Comment)  (3-5x/wk)   Recommendation   Recommendation Short-term skilled PT   PT - OK to Discharge Yes  (when medically cleared; if to STR)   Modified Khadar Scale   Modified Edwards Scale 4   Barthel Index   Feeding 5   Bathing 0   Grooming Score 5   Dressing Score 5   Bladder Score 5   Bowels Score 10   Toilet Use Score 5   Transfers (Bed/Chair) Score 10   Mobility (Level Surface) Score 0   Stairs Score 0   Barthel Index Score 45         Dara Dandy, PT, DPT

## 2019-11-20 LAB
ANION GAP SERPL CALCULATED.3IONS-SCNC: 9 MMOL/L (ref 4–13)
ATRIAL RATE: 78 BPM
BUN SERPL-MCNC: 18 MG/DL (ref 5–25)
CALCIUM SERPL-MCNC: 7.6 MG/DL (ref 8.3–10.1)
CHLORIDE SERPL-SCNC: 104 MMOL/L (ref 100–108)
CO2 SERPL-SCNC: 28 MMOL/L (ref 21–32)
CREAT SERPL-MCNC: 0.71 MG/DL (ref 0.6–1.3)
GFR SERPL CREATININE-BSD FRML MDRD: 76 ML/MIN/1.73SQ M
GLUCOSE SERPL-MCNC: 74 MG/DL (ref 65–140)
MAGNESIUM SERPL-MCNC: 1.9 MG/DL (ref 1.6–2.6)
P AXIS: 74 DEGREES
POTASSIUM SERPL-SCNC: 3.2 MMOL/L (ref 3.5–5.3)
PR INTERVAL: 142 MS
QRS AXIS: -24 DEGREES
QRSD INTERVAL: 98 MS
QT INTERVAL: 396 MS
QTC INTERVAL: 451 MS
SODIUM SERPL-SCNC: 141 MMOL/L (ref 136–145)
T WAVE AXIS: 66 DEGREES
VENTRICULAR RATE: 78 BPM

## 2019-11-20 PROCEDURE — 83735 ASSAY OF MAGNESIUM: CPT | Performed by: STUDENT IN AN ORGANIZED HEALTH CARE EDUCATION/TRAINING PROGRAM

## 2019-11-20 PROCEDURE — G8988 SELF CARE GOAL STATUS: HCPCS

## 2019-11-20 PROCEDURE — 97110 THERAPEUTIC EXERCISES: CPT

## 2019-11-20 PROCEDURE — C9113 INJ PANTOPRAZOLE SODIUM, VIA: HCPCS | Performed by: PHYSICIAN ASSISTANT

## 2019-11-20 PROCEDURE — 97167 OT EVAL HIGH COMPLEX 60 MIN: CPT

## 2019-11-20 PROCEDURE — 99233 SBSQ HOSP IP/OBS HIGH 50: CPT | Performed by: SURGERY

## 2019-11-20 PROCEDURE — 97530 THERAPEUTIC ACTIVITIES: CPT

## 2019-11-20 PROCEDURE — 93010 ELECTROCARDIOGRAM REPORT: CPT | Performed by: INTERNAL MEDICINE

## 2019-11-20 PROCEDURE — 99232 SBSQ HOSP IP/OBS MODERATE 35: CPT | Performed by: STUDENT IN AN ORGANIZED HEALTH CARE EDUCATION/TRAINING PROGRAM

## 2019-11-20 PROCEDURE — G8987 SELF CARE CURRENT STATUS: HCPCS

## 2019-11-20 PROCEDURE — 80048 BASIC METABOLIC PNL TOTAL CA: CPT | Performed by: STUDENT IN AN ORGANIZED HEALTH CARE EDUCATION/TRAINING PROGRAM

## 2019-11-20 RX ORDER — PANTOPRAZOLE SODIUM 40 MG/1
40 INJECTION, POWDER, FOR SOLUTION INTRAVENOUS
Status: DISCONTINUED | OUTPATIENT
Start: 2019-11-20 | End: 2019-11-23 | Stop reason: HOSPADM

## 2019-11-20 RX ORDER — POTASSIUM CHLORIDE 29.8 MG/ML
40 INJECTION INTRAVENOUS ONCE
Status: DISCONTINUED | OUTPATIENT
Start: 2019-11-20 | End: 2019-11-20 | Stop reason: SDUPTHER

## 2019-11-20 RX ORDER — POTASSIUM CHLORIDE 14.9 MG/ML
20 INJECTION INTRAVENOUS
Status: COMPLETED | OUTPATIENT
Start: 2019-11-20 | End: 2019-11-20

## 2019-11-20 RX ADMIN — POTASSIUM CHLORIDE 20 MEQ: 200 INJECTION, SOLUTION INTRAVENOUS at 13:38

## 2019-11-20 RX ADMIN — DEXTROSE, SODIUM CHLORIDE, AND POTASSIUM CHLORIDE 75 ML/HR: 5; .45; .15 INJECTION INTRAVENOUS at 18:46

## 2019-11-20 RX ADMIN — HEPARIN SODIUM 5000 UNITS: 5000 INJECTION INTRAVENOUS; SUBCUTANEOUS at 06:14

## 2019-11-20 RX ADMIN — HEPARIN SODIUM 5000 UNITS: 5000 INJECTION INTRAVENOUS; SUBCUTANEOUS at 14:42

## 2019-11-20 RX ADMIN — POTASSIUM CHLORIDE 20 MEQ: 200 INJECTION, SOLUTION INTRAVENOUS at 10:42

## 2019-11-20 RX ADMIN — HEPARIN SODIUM 5000 UNITS: 5000 INJECTION INTRAVENOUS; SUBCUTANEOUS at 22:37

## 2019-11-20 RX ADMIN — PANTOPRAZOLE SODIUM 40 MG: 40 INJECTION, POWDER, FOR SOLUTION INTRAVENOUS at 10:42

## 2019-11-20 NOTE — SOCIAL WORK
CM met with Arcelia and other family members from [place]  STR rec discussed  Pt has been to [facility] but is not an option at this time  [facility] is in close proximity  Family agreeable to [facility] [address] as first choice and [facility] as second choice  Family will possibly tour [place]  CM sent referrals to theses SNFs

## 2019-11-20 NOTE — PHYSICAL THERAPY NOTE
Physical Therapy Treatment Note       11/20/19 1114   Pain Assessment   Pain Assessment No/denies pain   Pain Score No Pain   Restrictions/Precautions   Weight Bearing Precautions Per Order No   Other Precautions Chair Alarm; Bed Alarm;Multiple lines; Fall Risk;Hard of hearing  (NGT)   General   Chart Reviewed Yes   Response to Previous Treatment Patient with no complaints from previous session  Family/Caregiver Present No   Cognition   Overall Cognitive Status Impaired   Arousal/Participation Cooperative; Alert   Attention Within functional limits   Orientation Level Oriented to person;Disoriented to time;Disoriented to situation;Disoriented to place  (Inconsistent to time & place)   Memory Decreased recall of recent events;Decreased short term memory   Following Commands Follows one step commands without difficulty   Comments pt agreeable to PT treatment   Subjective   Subjective "Ok, let's go!"   Bed Mobility   Rolling L 4  Minimal assistance  (CGA)   Additional items Assist x 1; Increased time required;Verbal cues;LE management; Bedrails;HOB elevated   Supine to Sit 4  Minimal assistance   Additional items Assist x 1; Increased time required;Verbal cues;LE management;HOB elevated; Bedrails   Transfers   Sit to Stand 4  Minimal assistance   Additional items Assist x 1; Increased time required;Verbal cues;Armrests   Stand to Sit 4  Minimal assistance   Additional items Assist x 1; Increased time required;Verbal cues;Armrests   Ambulation/Elevation   Gait pattern Excessively slow; Short stride;Decreased foot clearance   Gait Assistance 4  Minimal assist  (CGA-MIN A)   Additional items Assist x 1;Verbal cues   Assistive Device Rolling walker   Distance 3' bed>chair + 4'  (limited by NGT)   Stair Management Assistance Not tested   Balance   Static Sitting Good   Dynamic Sitting Good   Static Standing Fair +   Dynamic Standing Fair   Ambulatory Fair -   Endurance Deficit   Endurance Deficit Yes   Activity Tolerance   Activity Tolerance Patient limited by fatigue   Medical Staff Made Aware SPT Marva Willis   Nurse Made Aware RN Rivka Esteves confirmed patient appropriate for therapy; PATRICK Mayer made aware of session outcomes   Exercises   Quad Sets Sitting;10 reps;AROM; Bilateral   Knee AROM Long Arc Quad Sitting;10 reps;AROM; Bilateral   Ankle Pumps Sitting;10 reps;AROM; Bilateral   Assessment   Prognosis Good   Problem List Decreased strength;Decreased endurance; Impaired balance;Decreased mobility; Decreased cognition;Decreased safety awareness; Impaired hearing   Assessment Pt seen for PT treatment session this date with interventions consisting of gait training w/ emphasis on improving pt's ability to ambulate level surfaces x 3' bed>chair + 4' with CGA-MIN A provided by therapist with RW, Therapeutic exercise consisting of: AROM 10 reps B LE in sitting position and therapeutic activity consisting of training: bed mobility, supine<>sit transfers and sit<>stand transfers  Pt agreeable to PT treatment session upon arrival, pt found supine in bed w/ HOB elevated, in no apparent distress, A&O x 1 and responsive  In comparison to previous session, pt with minimal improvements in bed mobility transitions and ambulation with improving independence; however, further progression of functional mobility training limited by NGT  Post session: pt returned back to recliner, chair alarm engaged, all needs in reach and RN notified of session findings/recommendations  Continue to recommend STR at time of d/c in order to maximize pt's functional independence and safety w/ mobility  Pt continues to be functioning below baseline level, and remains limited 2* factors listed above  PT will continue to see pt while here in order to address the deficits listed above and provide interventions consistent w/ POC in effort to achieve STGs     Barriers to Discharge Decreased caregiver support   Goals   Patient Goals "I sat up in the chair yesterday"   STG Expiration Date 11/29/19 PT Treatment Day 1   Plan   Treatment/Interventions Functional transfer training;LE strengthening/ROM; Elevations; Therapeutic exercise; Endurance training;Patient/family training;Equipment eval/education; Bed mobility;Gait training;Spoke to nursing   Progress Slow progress, decreased activity tolerance   PT Frequency Other (Comment)  (3-5x/wk)   Recommendation   Recommendation Short-term skilled PT   PT - OK to Discharge Yes  (when medically cleared; if to STR)       Miley Celaya PT, DPT    Time of PT treatment session: 10:50-11:14  24 minutes

## 2019-11-20 NOTE — SOCIAL WORK
CM was informed by Dr Isrrael Kingsley and Dr Aaliyah Iniguez to hold off on the care team meeting today  Pt is improving medically at this time  CM contacted Pavan Mohan at [phone] and left vm letting her know that the care team meeting is being put on hold at this time

## 2019-11-20 NOTE — DISCHARGE INSTRUCTIONS
Bowel Obstruction   WHAT YOU NEED TO KNOW:   A bowel obstruction occurs when your large or small intestine is completely or partly blocked  The blockage prevents food and waste from passing through normally  DISCHARGE INSTRUCTIONS:   Follow up with your healthcare provider as directed:  Write down your questions so you remember to ask them during your visits  Contact your healthcare provider if:   · You have nausea and are vomiting  · Your abdomen is enlarged  · You cannot pass a bowel movement or gas  · You lose weight without trying  · You have blood in your bowel movement  · You have questions or concerns about your condition or care  Return to the emergency department if:   · You have severe abdominal pain that does not get better  · Your heart is beating faster than normal for you  · You have a fever  © 2017 [employer]  Information is for End User's use only and may not be sold, redistributed or otherwise used for commercial purposes  All illustrations and images included in CareNotes® are the copyrighted property of [employer]  or Daljit Arvizu  The above information is an  only  It is not intended as medical advice for individual conditions or treatments  Talk to your doctor, nurse or pharmacist before following any medical regimen to see if it is safe and effective for you

## 2019-11-20 NOTE — PLAN OF CARE
Problem: Potential for Falls  Goal: Patient will remain free of falls  Description  INTERVENTIONS:  - Assess patient frequently for physical needs  -  Identify cognitive and physical deficits and behaviors that affect risk of falls  -  [place] fall precautions as indicated by assessment   - Educate patient/family on patient safety including physical limitations  - Instruct patient to call for assistance with activity based on assessment  - Modify environment to reduce risk of injury  - Consider OT/PT consult to assist with strengthening/mobility  Outcome: Progressing     Problem: Prexisting or High Potential for Compromised Skin Integrity  Goal: Skin integrity is maintained or improved  Description  INTERVENTIONS:  - Identify patients at risk for skin breakdown  - Assess and monitor skin integrity  - Assess and monitor nutrition and hydration status  - Monitor labs   - Assess for incontinence   - Turn and reposition patient  - Assist with mobility/ambulation  - Relieve pressure over bony prominences  - Avoid friction and shearing  - Provide appropriate hygiene as needed including keeping skin clean and dry  - Evaluate need for skin moisturizer/barrier cream  - Collaborate with interdisciplinary team   - Patient/family teaching  - Consider wound care consult   Outcome: Progressing     Problem: Nutrition/Hydration-ADULT  Goal: Nutrient/Hydration intake appropriate for improving, restoring or maintaining nutritional needs  Description  Monitor and assess patient's nutrition/hydration status for malnutrition  Collaborate with interdisciplinary team and initiate plan and interventions as ordered  Monitor patient's weight and dietary intake as ordered or per policy  Utilize nutrition screening tool and intervene as necessary  Determine patient's food preferences and provide high-protein, high-caloric foods as appropriate       INTERVENTIONS:  - Monitor oral intake, urinary output, labs, and treatment plans  - Assess nutrition and hydration status and recommend course of action  - Evaluate amount of meals eaten  - Assist patient with eating if necessary   - Allow adequate time for meals  - Recommend/ encourage appropriate diets, oral nutritional supplements, and vitamin/mineral supplements  - Order, calculate, and assess calorie counts as needed  - Recommend, monitor, and adjust tube feedings and TPN/PPN based on assessed needs  - Assess need for intravenous fluids  - Provide specific nutrition/hydration education as appropriate  - Include patient/family/caregiver in decisions related to nutrition  Outcome: Progressing     Problem: GASTROINTESTINAL - ADULT  Goal: Minimal or absence of nausea and/or vomiting  Description  INTERVENTIONS:  - Administer IV fluids if ordered to ensure adequate hydration  - Maintain NPO status until nausea and vomiting are resolved  - Nasogastric tube if ordered  - Administer ordered antiemetic medications as needed  - Provide nonpharmacologic comfort measures as appropriate  - Advance diet as tolerated, if ordered  - Consider nutrition services referral to assist patient with adequate nutrition and appropriate food choices  Outcome: Progressing  Goal: Maintains or returns to baseline bowel function  Description  INTERVENTIONS:  - Assess bowel function  - Encourage oral fluids to ensure adequate hydration  - Administer IV fluids if ordered to ensure adequate hydration  - Administer ordered medications as needed  - Encourage mobilization and activity  - Consider nutritional services referral to assist patient with adequate nutrition and appropriate food choices  Outcome: Progressing  Goal: Maintains adequate nutritional intake  Description  INTERVENTIONS:  - Monitor percentage of each meal consumed  - Identify factors contributing to decreased intake, treat as appropriate  - Assist with meals as needed  - Monitor I&O, weight, and lab values if indicated  - Obtain nutrition services referral as needed  Outcome: Progressing

## 2019-11-20 NOTE — PLAN OF CARE
Problem: Potential for Falls  Goal: Patient will remain free of falls  Description  INTERVENTIONS:  - Assess patient frequently for physical needs  -  Identify cognitive and physical deficits and behaviors that affect risk of falls  -  [place] fall precautions as indicated by assessment   - Educate patient/family on patient safety including physical limitations  - Instruct patient to call for assistance with activity based on assessment  - Modify environment to reduce risk of injury  - Consider OT/PT consult to assist with strengthening/mobility  Outcome: Progressing     Problem: Prexisting or High Potential for Compromised Skin Integrity  Goal: Skin integrity is maintained or improved  Description  INTERVENTIONS:  - Identify patients at risk for skin breakdown  - Assess and monitor skin integrity  - Assess and monitor nutrition and hydration status  - Monitor labs   - Assess for incontinence   - Turn and reposition patient  - Assist with mobility/ambulation  - Relieve pressure over bony prominences  - Avoid friction and shearing  - Provide appropriate hygiene as needed including keeping skin clean and dry  - Evaluate need for skin moisturizer/barrier cream  - Collaborate with interdisciplinary team   - Patient/family teaching  - Consider wound care consult   Outcome: Progressing     Problem: Nutrition/Hydration-ADULT  Goal: Nutrient/Hydration intake appropriate for improving, restoring or maintaining nutritional needs  Description  Monitor and assess patient's nutrition/hydration status for malnutrition  Collaborate with interdisciplinary team and initiate plan and interventions as ordered  Monitor patient's weight and dietary intake as ordered or per policy  Utilize nutrition screening tool and intervene as necessary  Determine patient's food preferences and provide high-protein, high-caloric foods as appropriate       INTERVENTIONS:  - Monitor oral intake, urinary output, labs, and treatment plans  - Assess nutrition and hydration status and recommend course of action  - Evaluate amount of meals eaten  - Assist patient with eating if necessary   - Allow adequate time for meals  - Recommend/ encourage appropriate diets, oral nutritional supplements, and vitamin/mineral supplements  - Order, calculate, and assess calorie counts as needed  - Recommend, monitor, and adjust tube feedings and TPN/PPN based on assessed needs  - Assess need for intravenous fluids  - Provide specific nutrition/hydration education as appropriate  - Include patient/family/caregiver in decisions related to nutrition  Outcome: Progressing     Problem: GASTROINTESTINAL - ADULT  Goal: Minimal or absence of nausea and/or vomiting  Description  INTERVENTIONS:  - Administer IV fluids if ordered to ensure adequate hydration  - Maintain NPO status until nausea and vomiting are resolved  - Nasogastric tube if ordered  - Administer ordered antiemetic medications as needed  - Provide nonpharmacologic comfort measures as appropriate  - Advance diet as tolerated, if ordered  - Consider nutrition services referral to assist patient with adequate nutrition and appropriate food choices  Outcome: Progressing  Goal: Maintains or returns to baseline bowel function  Description  INTERVENTIONS:  - Assess bowel function  - Encourage oral fluids to ensure adequate hydration  - Administer IV fluids if ordered to ensure adequate hydration  - Administer ordered medications as needed  - Encourage mobilization and activity  - Consider nutritional services referral to assist patient with adequate nutrition and appropriate food choices  Outcome: Progressing  Goal: Maintains adequate nutritional intake  Description  INTERVENTIONS:  - Monitor percentage of each meal consumed  - Identify factors contributing to decreased intake, treat as appropriate  - Assist with meals as needed  - Monitor I&O, weight, and lab values if indicated  - Obtain nutrition services referral as needed  Outcome: Progressing

## 2019-11-20 NOTE — PROGRESS NOTES
Progress Note - General Surgery   Griselda Enriquez 80 y o  female MRN: [id number]  Unit/Bed#: -01 Encounter: [id number]    Assessment/Plan    Pt  Seen at bedside with Dr Jackie Bailey at [id number] with surgery team    SBO high grade with transition in RLQ  Pt had small brown bowel movement last night, and passed small amount of gas  /24 h, looks maroon today, Hbg 13,   -cont NGT  -PPI  -cont to monitor exam and Hbg, no leukocytosis,      Hypokalemia   -K+ replete per SLIM  SBO most likely due to adhesive disease  H/o right hemicolectomy and appendectomy  Discussion with pt's daughter in front of patient about how aggressive the family is comfortable with  Discussed it is the surgical opinion that with her condition she may not have a favorable outcome die to her overall physical and mental conditions  She is minimally ambulatory and has dementia  Chief Complaint: I have some pain that goes across the bottom of my belly, but it goes away quickly  Its like a cramp  I passed a little bit of air from my groins today  Small brown stool last night per nursing notes  Objective/Exam:  Pt lying in bed, awake     General Appearance:    Alert and orientated x 3, cooperative, no distress   Lungs:     Clear to auscultation bilaterally, respirations unlabored    Heart:    Regular rate and rhythm   Abdomen:     Soft, lower abdomen is softer today, NBS, pt reports pressure with palpation but she is nontender  Extremities:   Extremities normal,  no cyanosis or edema   Pulses:   2+ and symmetric all extremities, no calf tenderness   Skin:   Skin color, texture, turgor normal, no rashes or lesions   Neurologic:   CNII-XII intact, normal strength, sensation and reflexes     Throughout, affect appropriate       Blood pressure 166/61, pulse 79, temperature 97 9 °F (36 6 °C), resp  rate 19, height 5' 2" (1 575 m), weight 33 2 kg (73 lb 3 1 oz), SpO2 96 %  ,Body mass index is 13 39 kg/m²  Intake/Output Summary (Last 24 hours) at 11/20/2019 1021  Last data filed at 11/20/2019 0701  Gross per 24 hour   Intake 1931 25 ml   Output 1750 ml   Net 181 25 ml       Invasive Devices     Peripheral Intravenous Line            Peripheral IV 11/18/19 Right Antecubital 1 day    Peripheral IV 11/20/19 Right Antecubital less than 1 day          Drain            NG/OG/Enteral Tube Nasogastric 14 Fr Right nares less than 1 day                                        Labs:   CBC with diff:   Lab Results   Component Value Date    WBC 5 88 11/19/2019    HGB 13 0 11/19/2019    HCT 41 8 11/19/2019     (H) 11/19/2019     11/19/2019    MCH 31 5 11/19/2019    MCHC 31 1 (L) 11/19/2019    RDW 14 9 11/19/2019    MPV 10 2 11/19/2019    NRBC 0 11/19/2019   ,   BMP/CMP:  Lab Results   Component Value Date    K 3 2 (L) 11/20/2019     11/20/2019    CO2 28 11/20/2019    BUN 18 11/20/2019    CREATININE 0 71 11/20/2019    CALCIUM 7 6 (L) 11/20/2019    AST 15 11/19/2019    ALT <6 (L) 11/19/2019    ALKPHOS 47 11/19/2019    EGFR 76 11/20/2019   ,   Lipid Panel: No results found for: CHOL,   Coags:   Lab Results   Component Value Date    PTT 25 11/18/2019    INR 0 99 11/18/2019   ,     Blood Culture:   Lab Results   Component Value Date    BLOODCX No Growth After 5 Days  05/06/2019    BLOODCX No Growth After 5 Days   05/06/2019   ,   Urinalysis:   Lab Results   Component Value Date    COLORU Yellow 11/18/2019    COLORU Yellow 12/07/2016    CLARITYU Clear 11/18/2019    CLARITYU Transparent 12/07/2016    SPECGRAV 1 010 11/18/2019    SPECGRAV 1 020 12/07/2016    PHUR 6 0 11/18/2019    PHUR 6 0 10/31/2018    PHUR 6 0 12/07/2016    LEUKOCYTESUR Negative 11/18/2019    LEUKOCYTESUR neg 12/07/2016    NITRITE Negative 11/18/2019    NITRITE neg 12/07/2016    PROTEINUA neg 12/07/2016    GLUCOSEU Negative 11/18/2019    KETONESU Trace (A) 11/18/2019    KETONESU neg 12/07/2016    BILIRUBINUR Negative 11/18/2019 BILIRUBINUR neg 12/07/2016    BLOODU Trace-Intact (A) 11/18/2019    BLOODU neg 12/07/2016   ,   Urine Culture: No results found for: URINECX,   Wound Culure: No results found for: WOUNDCULT      Imaging: Xr Chest Portable    Result Date: 11/19/2019  Impression: Stable emphysema  No acute cardiopulmonary pathology  Workstation performed: [id number]     Ct Abdomen Pelvis With Contrast    Result Date: 11/18/2019  Impression: 1  Findings in keeping with high-grade small bowel obstruction with transition point in the right lower quadrant, likely on the basis of adhesions  2   Tree-in-bud and centrilobular nodules at the lung bases as well as partially imaged bronchiectasis in the right middle lobe and the lingula, similar to prior exams  The findings are likely on the basis of chronic infectious/inflammatory processes such as mycobacterial infection  3   Stable 1 8 cm cystic pancreatic head lesion with internal septation  4   Stable 1 6 cm left adrenal nodule  The study was marked in [facility] for [facility] notification   Workstation performed: Terence Kwan PA-C  11/20/2019

## 2019-11-20 NOTE — PLAN OF CARE
Problem: OCCUPATIONAL THERAPY ADULT  Goal: Performs self-care activities at highest level of function for planned discharge setting  See evaluation for individualized goals  Description  Treatment Interventions: ADL retraining, Functional transfer training, UE strengthening/ROM, Endurance training, Equipment evaluation/education, Compensatory technique education, Continued evaluation, Energy conservation, Activityengagement          See flowsheet documentation for full assessment, interventions and recommendations  Note:   Limitation: Decreased ADL status, Decreased UE strength, Decreased endurance, Decreased Safe judgement during ADL, Decreased self-care trans, Decreased high-level ADLs  Prognosis: Good  Assessment: Patient is a 80 y o  female seen for OT evaluation s/p admit to [address] [place] [address] on 11/18/2019 w/Small bowel obstruction ([address] )  Commorbidities affecting patient's functional performance at time of assessment include: dementia, hypertension, malnutrition, history of TB, urinary incontinence, hearing loss  Orders placed for OT evaluation and treatment  Performed at least two patient identifiers during session including name and wristband  Prior to admission, Patient reported independent with ADLs, nees assistance with IADLs  patient ambulates with RW, ' not all the time the bathroom is just around the corner" Patient lives with her daughter in a 2 story house,  [address]  Daughter usually works, patient stays alone with her pets (per patient report)   Personal factors affecting patient at time of initial evaluation include: limited caregiver support, limited insight into deficits, non compliance, decreased initiation and engagement, difficulty performing ADLs and difficulty performing IADLs  Upon evaluation, patient requires minimal  assist for UB ADLs, moderate assist for LB ADLs, transfers and functional ambulation in room and bathroom with minimal  assist, with the use of Rolling Walker  Occupational performance is affected by the following deficits: decreased functional use of BUEs, in hand manipulation deficit with impaired reach, grasp and coordination, decreased muscle strength, degenerative arthritic joint changes, impaired gross motor coordination, dynamic sit/ stand balance deficit with poor standing tolerance time for self care and functional mobility, decreased activity tolerance, impaired memory, impaired problem solving, decreased safety awareness and postural control and postural alignment deficit, requiring external assistance to complete transitional movements  Therapist completed  extensive additional review of medical records and additional review of physical, cognitive or psychosocial history, clinical examination identifying 5 or more performance deficits, clinical decision making of a high complexity , consistent with a high complexity level evaluation  Patient to benefit from continued Occupational Therapy treatment while in the hospital to address deficits as defined above and maximize level of functional independence with ADLs and functional mobility        OT Discharge Recommendation: Short Term Rehab

## 2019-11-20 NOTE — PLAN OF CARE
Problem: PHYSICAL THERAPY ADULT  Goal: Performs mobility at highest level of function for planned discharge setting  See evaluation for individualized goals  Description  Treatment/Interventions: Functional transfer training, LE strengthening/ROM, Therapeutic exercise, Elevations, Endurance training, Patient/family training, Equipment eval/education, Bed mobility, Gait training, Spoke to nursing          See flowsheet documentation for full assessment, interventions and recommendations  Outcome: Progressing  Note:   Prognosis: Good  Problem List: Decreased strength, Decreased endurance, Impaired balance, Decreased mobility, Decreased cognition, Decreased safety awareness, Impaired hearing  Assessment: Pt seen for PT treatment session this date with interventions consisting of gait training w/ emphasis on improving pt's ability to ambulate level surfaces x 3' bed>chair + 4' with CGA-MIN A provided by therapist with RW, Therapeutic exercise consisting of: AROM 10 reps B LE in sitting position and therapeutic activity consisting of training: bed mobility, supine<>sit transfers and sit<>stand transfers  Pt agreeable to PT treatment session upon arrival, pt found supine in bed w/ HOB elevated, in no apparent distress, A&O x 1 and responsive  In comparison to previous session, pt with minimal improvements in bed mobility transitions and ambulation with improving independence; however, further progression of functional mobility training limited by NGT  Post session: pt returned back to recliner, chair alarm engaged, all needs in reach and RN notified of session findings/recommendations  Continue to recommend STR at time of d/c in order to maximize pt's functional independence and safety w/ mobility  Pt continues to be functioning below baseline level, and remains limited 2* factors listed above   PT will continue to see pt while here in order to address the deficits listed above and provide interventions consistent w/ POC in effort to achieve STGs  Barriers to Discharge: Decreased caregiver support     Recommendation: Short-term skilled PT     PT - OK to Discharge: Yes(when medically cleared; if to STR)    See flowsheet documentation for full assessment

## 2019-11-20 NOTE — OCCUPATIONAL THERAPY NOTE
Occupational Therapy Evaluation        Patient Name: Mckenzie Byers  VMYTY'L Date: [birth year] 11/20/19 1131   Note Type   Note type Eval only   Restrictions/Precautions   Weight Bearing Precautions Per Order No   Braces or Orthoses Other (Comment)  (none per chart review)   Other Precautions Fall Risk;Pain; Chair Alarm; Bed Alarm;Hard of hearing   Pain Assessment   Pain Assessment No/denies pain   Pain Score No Pain  ("some discomfort on my belly")   Home Living   Type of [address] [place] [address] Two level;Performs ADLs on one level; Able to live on main level with bedroom/bathroom  (1 JORGE)   Bathroom Shower/Tub Walk-in shower   Bathroom Toilet Standard   Bathroom Equipment Grab bars in shower   [address] [place]  ("I walk with the RW most of the time")   Prior Function   Level of [place] Independent with ADLs and functional mobility; Needs assistance with IADLs   Lives With Daughter  (works during the day; patient home alone during this time)   Receives Help From Family   ADL Assistance Independent   IADLs Needs assistance   Falls in the last 6 months 1 to 4  ("I fell months ago")   Vocational Retired   Lifestyle   Autonomy Patient reported independent with ADLs, nees assistance with IADLs  patient ambulates with RW, ' not all the time the bathroom is just around the corner" Patient lives with her daughter in a 2 story house,  [address]   Daughter usually works, patient stays alone with her pets (per patient report)   ADL   Eating Assistance 5  Supervision/Setup   Grooming Assistance 5  Supervision/Setup   UB Bathing Assistance 4  Minimal Assistance   LB [address] 3  Moderate Assistance   [address] 3  Moderate [address]  3  Moderate Assistance   Functional Assistance 3  Moderate Assistance   Transfers   Additional Comments received patient OOB to [address], PT treatment had just ended  session with PT, completed transfers and ambulation with PT with min assist of 1  Functional Mobility   Functional Mobility 4  Minimal assistance   Additional items Rolling walker   Balance   Static Sitting Good   Dynamic Sitting Good   Static Standing Fair +   Dynamic Standing Fair   Activity Tolerance   Activity Tolerance Patient limited by fatigue   RUE Assessment   RUE Assessment WFL  (4-/5)   LUE Assessment   LUE Assessment WFL  (4-/5)   Hand Function   Gross Motor Coordination Impaired   Fine Motor Coordination Functional   Sensation   Light Touch No apparent deficits  (BUEs)   Vision-Basic Assessment   Current Vision Does not wear glasses   Patient Visual Report Other (Comment)  ("I need to get new ones")   Cognition   Overall Cognitive Status Impaired   Arousal/Participation Cooperative; Alert   Attention Within functional limits   Orientation Level Oriented to person;Disoriented to place; Disoriented to time;Disoriented to situation   Memory Decreased recall of recent events;Decreased short term memory   Following Commands Follows one step commands without difficulty   Assessment   Limitation Decreased ADL status; Decreased UE strength;Decreased endurance;Decreased Safe judgement during ADL;Decreased self-care trans;Decreased high-level ADLs   Prognosis Good   Assessment Patient is a 80 y o  female seen for OT evaluation s/p admit to [facility] on 11/18/2019 w/Small bowel obstruction ([facility] [address] )  Commorbidities affecting patient's functional performance at time of assessment include: dementia, hypertension, malnutrition, history of TB, urinary incontinence, hearing loss  Orders placed for OT evaluation and treatment  Performed at least two patient identifiers during session including name and wristband  Prior to admission, Patient reported independent with ADLs, nees assistance with IADLs   patient ambulates with RW, ' not all the time the bathroom is just around the corner" Patient lives with her daughter in a 2 story house,  [address]  Daughter usually works, patient stays alone with her pets (per patient report)   Personal factors affecting patient at time of initial evaluation include: limited caregiver support, limited insight into deficits, non compliance, decreased initiation and engagement, difficulty performing ADLs and difficulty performing IADLs  Upon evaluation, patient requires minimal  assist for UB ADLs, moderate assist for LB ADLs, transfers and functional ambulation in room and bathroom with minimal  assist, with the use of Rolling Walker  Occupational performance is affected by the following deficits: decreased functional use of BUEs, in hand manipulation deficit with impaired reach, grasp and coordination, decreased muscle strength, degenerative arthritic joint changes, impaired gross motor coordination, dynamic sit/ stand balance deficit with poor standing tolerance time for self care and functional mobility, decreased activity tolerance, impaired memory, impaired problem solving, decreased safety awareness and postural control and postural alignment deficit, requiring external assistance to complete transitional movements  Therapist completed  extensive additional review of medical records and additional review of physical, cognitive or psychosocial history, clinical examination identifying 5 or more performance deficits, clinical decision making of a high complexity , consistent with a high complexity level evaluation  Patient to benefit from continued Occupational Therapy treatment while in the hospital to address deficits as defined above and maximize level of functional independence with ADLs and functional mobility  Goals   Patient Goals "I want to get stronger and go home"   Plan   Treatment Interventions ADL retraining;Functional transfer training;UE strengthening/ROM; Endurance training;Equipment evaluation/education; Compensatory technique education;Continued evaluation; Energy conservation; Activityengagement   Goal Expiration Date 12/04/19   OT Frequency 3-5x/wk   Recommendation   OT Discharge Recommendation Short Term Rehab   Barthel Index   Feeding 5   Bathing 0   Grooming Score 5   Dressing Score 5   Bladder Score 5   Bowels Score 10   Toilet Use Score 5   Transfers (Bed/Chair) Score 10   Mobility (Level Surface) Score 0   Stairs Score 0   Barthel Index Score 45   Modified Khadar Scale   Modified Titus Scale 4        Occupational Performance areas to address include: grooming , bathing/ shower, dressing, toilet hygiene, transfer to all surfaces, functional ambulation, functional mobility, community mobility, emergency response, IADLs: safety procedures, IADLs: meal prep/ clean up, Leisure Participation and Social participation  From OT standpoint, recommendation at time of d/c would be Short Term Rehab  Occupational Therapy goals: In 7-14 days:     1- Patient will verbalize and demonstrate use of energy conservation/ deep breathing technique and work simplification skills during functional activity with no verbal cues  2-Patient will verbalize and demonstrate good body mechanics and joint protection techniques during  ADLs/ IADLs with no verbal cues  3- Patient will increase OOB/ sitting tolerance to 2-4 hours per day for increased participation in self care and leisure tasks with no s/s of exertion  4-Patient will increase standing tolerance time to 5  minutes with unilateral UE support to complete sink level ADLs@ mod I level   5- Patient will increase sitting tolerance at edge of bed to 20 minutes to complete UB ADLs @ set up assist level  6- Patient will transfer bed to Chair / toilet at Set up assist level with AD as indicated  7- Patient will complete UB ADLs with set up assist  8- Patient will complete LB ADLs with min assist with the use of adaptive equipment    9- Patient will complete toileting hygiene with set up assist/ supervision for thoroughness    10-Patient/ Family  will demonstrate competency with UE Home Exercise Program

## 2019-11-20 NOTE — ASSESSMENT & PLAN NOTE
Malnutrition Findings:   Malnutrition type: Chronic illness  Degree of Malnutrition: Other severe protein calorie malnutrition    BMI Findings:  BMI Classifications: Underweight < 18 5     Body mass index is 13 39 kg/m²  Patient with a significantly low BMI, nutrition consulted  Low albumin, suspect chronic malnutrition secondary to age and dementia and inadequate caloric intake  She has bitemporal wasting, prominent clavicles and ribs, prominent spinous processes, fat and muscle wasting

## 2019-11-20 NOTE — PROGRESS NOTES
Progress Note Edgar Lynch [birth year], 80 y o  female MRN: [id number]    Unit/Bed#: -01 Encounter: [id number]    Primary Care Provider: Memo Jones MD   Date and time admitted to hospital: 11/18/2019 10:20 AM        * Small bowel obstruction ([facility] )  Assessment & Plan  · On admission, patient presents approximately 2 days of abdominal pain with associated nausea and vomiting  CT scan is revealing a high-grade small-bowel obstruction, possibly secondary to adhesions  · On my encounter patient appears comfortable  Denies episodes of nausea or vomiting  Still complaining of some abdominal discomfort and poor appetite  Had small bowel movement earlier  · NGT noted with biliary drainage  · Continue with NPO  · Continue NG tube to decompress per surgery recommendation  · Continue IV fluid hydration  · Electrolytes are supplemented  · Nutrition consult secondary to malnutrition  · Surgery recommendation noted for conservative management  · If not improving or worsening will consider follow-up with palliative care consult given patient's overall risk with surgical intervention  · Incentive spirometry  · Fall precaution  · Out of bed to chair and ambulate with assistant as much as tolerated  · Daughter present at bedside and she is agreeable with above plan  Malnutrition ([facility] )  Assessment & Plan  Malnutrition Findings:   Malnutrition type: Chronic illness  Degree of Malnutrition: Other severe protein calorie malnutrition    BMI Findings:  BMI Classifications: Underweight < 18 5     Body mass index is 13 39 kg/m²  Patient with a significantly low BMI, nutrition consulted  Low albumin, suspect chronic malnutrition secondary to age and dementia and inadequate caloric intake  She has bitemporal wasting, prominent clavicles and ribs, prominent spinous processes, fat and muscle wasting  Hypertension  Assessment & Plan  · Hold home medication for now     · Will provide IV medications as needed  Dementia [facility])  Assessment & Plan  · Patient does have dementia, daughter reports she is pretty forgetful at times at home  · Dementia precautions, reoriented as needed  · Holding donepezil and sertraline for now given NPO  VTE Pharmacologic Prophylaxis:   Pharmacologic: Heparin  Patient Centered Rounds: I have performed bedside rounds with nursing staff today  Discussions with Specialists or Other Care Team Provider:  Surgery recommendation noted  Education and Discussions with Family / Patient:  Patient  Called her daughter Vero Sun but unable to get hold of her  Time Spent for Care: 30 minutes  More than 50% of total time spent on counseling and coordination of care as described above  Current Length of Stay: 2 day(s)    Current Patient Status: Inpatient   Certification Statement: The patient will continue to require additional inpatient hospital stay due to Small bowel obstruction  Discharge Plan: tbd    Code Status: Level 1 - Full Code      Subjective:   Afebrile, hemodynamically stable  NG tube noted with biliary drainage  Patient reports bowel movement earlier  Reports mild discomfort has significantly improved after NG tube  Denies nausea, vomiting, chest pain, any other new complaints  No other events reported  Objective:     Vitals:   Temp (24hrs), Av 3 °F (36 8 °C), Min:97 9 °F (36 6 °C), Max:98 8 °F (37 1 °C)    Temp:  [97 9 °F (36 6 °C)-98 8 °F (37 1 °C)] 97 9 °F (36 6 °C)  HR:  [74-79] 79  Resp:  [19] 19  BP: (160-166)/(61-63) 166/61  SpO2:  [95 %-96 %] 96 %  Body mass index is 13 39 kg/m²  Input and Output Summary (last 24 hours): Intake/Output Summary (Last 24 hours) at 2019 1545  Last data filed at 2019 1355  Gross per 24 hour   Intake 1931 25 ml   Output 1950 ml   Net -18 75 ml       Physical Exam:     Physical Exam   Constitutional: No distress  Elderly, frail, cachectic female patient not in acute distress     HENT:   Head: Normocephalic and atraumatic  NG tube with biliary drainage noted  Eyes: Pupils are equal, round, and reactive to light  EOM are normal    Neck: Normal range of motion  Neck supple  Cardiovascular: Normal rate and regular rhythm  Pulmonary/Chest: Effort normal and breath sounds normal  No stridor  No respiratory distress  Abdominal: Soft  Bowel sounds are normal  She exhibits no distension  There is no tenderness  There is no guarding  Musculoskeletal: Normal range of motion  Neurological:   Awake, alert, oriented baseline  Cooperative to exam    Skin: She is not diaphoretic  Psychiatric: Her behavior is normal      Additional Data:     Labs:    Results from last 7 days   Lab Units 11/19/19  0525   WBC Thousand/uL 5 88   HEMOGLOBIN g/dL 13 0   HEMATOCRIT % 41 8   PLATELETS Thousands/uL 209   NEUTROS PCT % 63   LYMPHS PCT % 17   MONOS PCT % 17*   EOS PCT % 2     Results from last 7 days   Lab Units 11/20/19  0604 11/19/19  0525   SODIUM mmol/L 141 142   POTASSIUM mmol/L 3 2* 3 3*   CHLORIDE mmol/L 104 105   CO2 mmol/L 28 24   BUN mg/dL 18 21   CREATININE mg/dL 0 71 0 77   ANION GAP mmol/L 9 13   CALCIUM mg/dL 7 6* 8 1*   ALBUMIN g/dL  --  2 7*   TOTAL BILIRUBIN mg/dL  --  0 80   ALK PHOS U/L  --  47   ALT U/L  --  <6*   AST U/L  --  15   GLUCOSE RANDOM mg/dL 74 67     Results from last 7 days   Lab Units 11/18/19  1040   INR  0 99             Results from last 7 days   Lab Units 11/18/19  1118   LACTIC ACID mmol/L 1 3           * I Have Reviewed All Lab Data Listed Above  * Additional Pertinent Lab Tests Reviewed:  All Labs Within Last 24 Hours Reviewed      Recent Cultures (last 7 days):           Last 24 Hours Medication List:     Current Facility-Administered Medications:  acetaminophen 650 mg Oral Q6H PRN Thais Roger PA-C    aluminum-magnesium hydroxide-simethicone 30 mL Oral Q6H PRN Leanne Roger PA-C    dextrose 5 % and sodium chloride 0 45 % with KCl 20 mEq/L 75 mL/hr Intravenous Continuous Yoselyn Noguera Last Rate: 75 mL/hr (11/19/19 2233)   heparin (porcine) 5,000 Units Subcutaneous [facility] Leanne Roger PA-C    hydrALAZINE 10 mg Intravenous Q6H PRN Taylor Saldivar PA-C    Lidocaine Viscous HCl 10 mL Swish & Swallow TID PRN Gio Flower PA-C    metoprolol 5 mg Intravenous Q8H PRN Therese LOREDO MD    nicotine 1 patch Transdermal Daily Leanne Roger PA-C    ondansetron 4 mg Intravenous Q6H PRN Leanne Roger PA-C    pantoprazole 40 mg Intravenous Q24H [address] Vernell Seo PA-C    phenol 1 spray Mouth/Throat Q2H PRN Gio Flower PA-C         Today, Patient Was Seen By: Radha Almaraz MD    ** Please Note: Dictation voice to text software may have been used in the creation of this document   **

## 2019-11-20 NOTE — ASSESSMENT & PLAN NOTE
· On admission, patient presents approximately 2 days of abdominal pain with associated nausea and vomiting  CT scan is revealing a high-grade small-bowel obstruction, possibly secondary to adhesions  · On my encounter patient appears comfortable  Denies episodes of nausea or vomiting  Still complaining of some abdominal discomfort and poor appetite  Had small bowel movement earlier  · NGT noted with biliary drainage  · Continue with NPO  · Continue NG tube to decompress per surgery recommendation  · Continue IV fluid hydration  · Electrolytes are supplemented  · Nutrition consult secondary to malnutrition  · Surgery recommendation noted for conservative management  · If not improving or worsening will consider follow-up with palliative care consult given patient's overall risk with surgical intervention  · Incentive spirometry  · Fall precaution  · Out of bed to chair and ambulate with assistant as much as tolerated  · Daughter present at bedside and she is agreeable with above plan

## 2019-11-21 ENCOUNTER — APPOINTMENT (INPATIENT)
Dept: RADIOLOGY | Facility: HOSPITAL | Age: 84
DRG: 388 | End: 2019-11-21
Payer: MEDICARE

## 2019-11-21 LAB
ANION GAP SERPL CALCULATED.3IONS-SCNC: 8 MMOL/L (ref 4–13)
BUN SERPL-MCNC: 8 MG/DL (ref 5–25)
CALCIUM SERPL-MCNC: 8 MG/DL (ref 8.3–10.1)
CHLORIDE SERPL-SCNC: 102 MMOL/L (ref 100–108)
CO2 SERPL-SCNC: 29 MMOL/L (ref 21–32)
CREAT SERPL-MCNC: 0.81 MG/DL (ref 0.6–1.3)
GFR SERPL CREATININE-BSD FRML MDRD: 65 ML/MIN/1.73SQ M
GLUCOSE SERPL-MCNC: 116 MG/DL (ref 65–140)
MAGNESIUM SERPL-MCNC: 1.7 MG/DL (ref 1.6–2.6)
POTASSIUM SERPL-SCNC: 3.8 MMOL/L (ref 3.5–5.3)
SODIUM SERPL-SCNC: 139 MMOL/L (ref 136–145)

## 2019-11-21 PROCEDURE — 80048 BASIC METABOLIC PNL TOTAL CA: CPT | Performed by: STUDENT IN AN ORGANIZED HEALTH CARE EDUCATION/TRAINING PROGRAM

## 2019-11-21 PROCEDURE — 97535 SELF CARE MNGMENT TRAINING: CPT

## 2019-11-21 PROCEDURE — C9113 INJ PANTOPRAZOLE SODIUM, VIA: HCPCS | Performed by: PHYSICIAN ASSISTANT

## 2019-11-21 PROCEDURE — 83735 ASSAY OF MAGNESIUM: CPT | Performed by: STUDENT IN AN ORGANIZED HEALTH CARE EDUCATION/TRAINING PROGRAM

## 2019-11-21 PROCEDURE — 74250 X-RAY XM SM INT 1CNTRST STD: CPT

## 2019-11-21 PROCEDURE — 97530 THERAPEUTIC ACTIVITIES: CPT

## 2019-11-21 PROCEDURE — 99233 SBSQ HOSP IP/OBS HIGH 50: CPT | Performed by: PHYSICIAN ASSISTANT

## 2019-11-21 PROCEDURE — 99232 SBSQ HOSP IP/OBS MODERATE 35: CPT | Performed by: STUDENT IN AN ORGANIZED HEALTH CARE EDUCATION/TRAINING PROGRAM

## 2019-11-21 PROCEDURE — 97110 THERAPEUTIC EXERCISES: CPT

## 2019-11-21 RX ADMIN — HEPARIN SODIUM 5000 UNITS: 5000 INJECTION INTRAVENOUS; SUBCUTANEOUS at 21:17

## 2019-11-21 RX ADMIN — NICOTINE 1 PATCH: 14 PATCH TRANSDERMAL at 09:32

## 2019-11-21 RX ADMIN — HYDRALAZINE HYDROCHLORIDE 10 MG: 20 INJECTION INTRAMUSCULAR; INTRAVENOUS at 22:48

## 2019-11-21 RX ADMIN — HEPARIN SODIUM 5000 UNITS: 5000 INJECTION INTRAVENOUS; SUBCUTANEOUS at 05:38

## 2019-11-21 RX ADMIN — PANTOPRAZOLE SODIUM 40 MG: 40 INJECTION, POWDER, FOR SOLUTION INTRAVENOUS at 09:32

## 2019-11-21 RX ADMIN — DEXTROSE, SODIUM CHLORIDE, AND POTASSIUM CHLORIDE 75 ML/HR: 5; .45; .15 INJECTION INTRAVENOUS at 07:44

## 2019-11-21 RX ADMIN — HEPARIN SODIUM 5000 UNITS: 5000 INJECTION INTRAVENOUS; SUBCUTANEOUS at 14:21

## 2019-11-21 RX ADMIN — DEXTROSE, SODIUM CHLORIDE, AND POTASSIUM CHLORIDE 75 ML/HR: 5; .45; .15 INJECTION INTRAVENOUS at 22:49

## 2019-11-21 NOTE — PROGRESS NOTES
Progress Note Jason Grant [birth year], 80 y o  female MRN: [id number]    Unit/Bed#: -01 Encounter: [id number]    Primary Care Provider: Allison Chamorro MD   Date and time admitted to hospital: 11/18/2019 10:20 AM        * Small bowel obstruction ([facility] )  Assessment & Plan  · On admission, patient presents approximately 2 days of abdominal pain with associated nausea and vomiting  CT scan is revealing a high-grade small-bowel obstruction, possibly secondary to adhesions  · On my encounter patient appears comfortable  Denies episodes of nausea or vomiting  Reports improvement in her abdominal discomfort  Requesting for diet  Had bowel movement day before yesterday but none and last 24 hours  NGT noted with drainage but has decreased  · Continue with NP  · Continue IV fluid hydration  · Electrolytes are supplemented  · Nutrition consult secondary to malnutrition  · Surgery recommendation noted for conservative management  · If not improving or worsening will consider follow-up with palliative care consult given patient's overall risk with surgical intervention  · Incentive spirometry  · Fall precaution  · Small bowel follow-through study today  · Out of bed to chair and ambulate with assistant as much as tolerated  Malnutrition ([facility] )  Assessment & Plan  Malnutrition Findings:   Malnutrition type: Chronic illness  Degree of Malnutrition: Other severe protein calorie malnutrition    BMI Findings:  BMI Classifications: Underweight < 18 5     Body mass index is 13 39 kg/m²  Patient with a significantly low BMI, nutrition consulted  Low albumin, suspect chronic malnutrition secondary to age and dementia and inadequate caloric intake  She has bitemporal wasting, prominent clavicles and ribs, prominent spinous processes, fat and muscle wasting  Hypertension  Assessment & Plan  · Hold home medication for now  · Will provide IV medications as needed      Dementia [facility])  Assessment & Plan  · Patient does have dementia, daughter reports she is pretty forgetful at times at home  · Dementia precautions, reoriented as needed  · Holding donepezil and sertraline for now given NPO  VTE Pharmacologic Prophylaxis:   Pharmacologic: Heparin    Patient Centered Rounds: I have performed bedside rounds with nursing staff today  Discussions with Specialists or Other Care Team Provider:  Surgery    Education and Discussions with Family / Patient:  Patient    Time Spent for Care: 30 minutes  More than 50% of total time spent on counseling and coordination of care as described above  Current Length of Stay: 3 day(s)    Current Patient Status: Inpatient   Certification Statement: The patient will continue to require additional inpatient hospital stay due to Small bowel obstruction improving slowly  Discharge Plan: tbd    Code Status: Level 1 - Full Code      Subjective:   Afebrile, hemodynamically stable  Patient had bowel movement day before yesterday  No bowel movement in last 24 hours  NG tube output has decreased  Plan is to follow up with small bowel follow-through study today  No other events reported  Objective:     Vitals:   Temp (24hrs), Av 5 °F (36 9 °C), Min:98 4 °F (36 9 °C), Max:98 5 °F (36 9 °C)    Temp:  [98 4 °F (36 9 °C)-98 5 °F (36 9 °C)] 98 5 °F (36 9 °C)  HR:  [90] 90  Resp:  [18-19] 18  BP: (118-175)/(56-85) 175/85  SpO2:  [96 %] 96 %  Body mass index is 13 39 kg/m²  Input and Output Summary (last 24 hours): Intake/Output Summary (Last 24 hours) at 2019 1759  Last data filed at 2019 1427  Gross per 24 hour   Intake 50 ml   Output 1425 ml   Net -1375 ml       Physical Exam:     Physical Exam   Constitutional: No distress  Elderly, frail, cachectic female patient not in acute distress  HENT:   Head: Normocephalic and atraumatic  NG tube with biliary drainage noted  Eyes: Pupils are equal, round, and reactive to light   EOM are normal  Neck: Normal range of motion  Neck supple  Cardiovascular: Normal rate and regular rhythm  Pulmonary/Chest: Effort normal and breath sounds normal  No stridor  No respiratory distress  Abdominal: Soft  Bowel sounds are normal  She exhibits no distension  There is no tenderness  There is no guarding  Musculoskeletal: Normal range of motion  Neurological:   Awake, alert, oriented baseline  Cooperative to exam    Skin: She is not diaphoretic  Psychiatric: Her behavior is normal        Additional Data:     Labs:    Results from last 7 days   Lab Units 11/19/19  0525   WBC Thousand/uL 5 88   HEMOGLOBIN g/dL 13 0   HEMATOCRIT % 41 8   PLATELETS Thousands/uL 209   NEUTROS PCT % 63   LYMPHS PCT % 17   MONOS PCT % 17*   EOS PCT % 2     Results from last 7 days   Lab Units 11/21/19  0557  11/19/19  0525   SODIUM mmol/L 139   < > 142   POTASSIUM mmol/L 3 8   < > 3 3*   CHLORIDE mmol/L 102   < > 105   CO2 mmol/L 29   < > 24   BUN mg/dL 8   < > 21   CREATININE mg/dL 0 81   < > 0 77   ANION GAP mmol/L 8   < > 13   CALCIUM mg/dL 8 0*   < > 8 1*   ALBUMIN g/dL  --   --  2 7*   TOTAL BILIRUBIN mg/dL  --   --  0 80   ALK PHOS U/L  --   --  47   ALT U/L  --   --  <6*   AST U/L  --   --  15   GLUCOSE RANDOM mg/dL 116   < > 67    < > = values in this interval not displayed  Results from last 7 days   Lab Units 11/18/19  1040   INR  0 99             Results from last 7 days   Lab Units 11/18/19  1118   LACTIC ACID mmol/L 1 3           * I Have Reviewed All Lab Data Listed Above  * Additional Pertinent Lab Tests Reviewed:  All Labs Within Last 24 Hours Reviewed      Recent Cultures (last 7 days):           Last 24 Hours Medication List:     Current Facility-Administered Medications:  acetaminophen 650 mg Oral Q6H PRN Eva Shelby PA-C    aluminum-magnesium hydroxide-simethicone 30 mL Oral Q6H PRN Leanne Roger PA-C    dextrose 5 % and sodium chloride 0 45 % with KCl 20 mEq/L 75 mL/hr Intravenous Continuous Lesa Watters Daniel Lawrence PA-C Last Rate: 75 mL/hr (11/21/19 0744)   heparin (porcine) 5,000 Units Subcutaneous [facility] Leanne Roger PA-C    hydrALAZINE 10 mg Intravenous Q6H PRN Carri Calero PA-C    Lidocaine Viscous HCl 10 mL Swish & Swallow TID PRN Andrew Moses PA-C    metoprolol 5 mg Intravenous Q8H PRN Anita LOREDO MD    nicotine 1 patch Transdermal Daily Leanne Roger PA-C    ondansetron 4 mg Intravenous Q6H PRN Leanne Roger PA-C    pantoprazole 40 mg Intravenous Q24H [facility] Vernell Seo PA-C    phenol 1 spray Mouth/Throat Q2H PRN Andrew Moses PA-C         Today, Patient Was Seen By: Jeff Weiner MD    ** Please Note: Dictation voice to text software may have been used in the creation of this document   **

## 2019-11-21 NOTE — PLAN OF CARE
Problem: Potential for Falls  Goal: Patient will remain free of falls  Description  INTERVENTIONS:  - Assess patient frequently for physical needs  -  Identify cognitive and physical deficits and behaviors that affect risk of falls  -  Marland fall precautions as indicated by assessment   - Educate patient/family on patient safety including physical limitations  - Instruct patient to call for assistance with activity based on assessment  - Modify environment to reduce risk of injury  - Consider OT/PT consult to assist with strengthening/mobility  Outcome: Progressing     Problem: Prexisting or High Potential for Compromised Skin Integrity  Goal: Skin integrity is maintained or improved  Description  INTERVENTIONS:  - Identify patients at risk for skin breakdown  - Assess and monitor skin integrity  - Assess and monitor nutrition and hydration status  - Monitor labs   - Assess for incontinence   - Turn and reposition patient  - Assist with mobility/ambulation  - Relieve pressure over bony prominences  - Avoid friction and shearing  - Provide appropriate hygiene as needed including keeping skin clean and dry  - Evaluate need for skin moisturizer/barrier cream  - Collaborate with interdisciplinary team   - Patient/family teaching  - Consider wound care consult   Outcome: Progressing     Problem: Nutrition/Hydration-ADULT  Goal: Nutrient/Hydration intake appropriate for improving, restoring or maintaining nutritional needs  Description  Monitor and assess patient's nutrition/hydration status for malnutrition  Collaborate with interdisciplinary team and initiate plan and interventions as ordered  Monitor patient's weight and dietary intake as ordered or per policy  Utilize nutrition screening tool and intervene as necessary  Determine patient's food preferences and provide high-protein, high-caloric foods as appropriate       INTERVENTIONS:  - Monitor oral intake, urinary output, labs, and treatment plans  - Assess nutrition and hydration status and recommend course of action  - Evaluate amount of meals eaten  - Assist patient with eating if necessary   - Allow adequate time for meals  - Recommend/ encourage appropriate diets, oral nutritional supplements, and vitamin/mineral supplements  - Order, calculate, and assess calorie counts as needed  - Recommend, monitor, and adjust tube feedings and TPN/PPN based on assessed needs  - Assess need for intravenous fluids  - Provide specific nutrition/hydration education as appropriate  - Include patient/family/caregiver in decisions related to nutrition  Outcome: Progressing     Problem: GASTROINTESTINAL - ADULT  Goal: Minimal or absence of nausea and/or vomiting  Description  INTERVENTIONS:  - Administer IV fluids if ordered to ensure adequate hydration  - Maintain NPO status until nausea and vomiting are resolved  - Nasogastric tube if ordered  - Administer ordered antiemetic medications as needed  - Provide nonpharmacologic comfort measures as appropriate  - Advance diet as tolerated, if ordered  - Consider nutrition services referral to assist patient with adequate nutrition and appropriate food choices  Outcome: Progressing  Goal: Maintains or returns to baseline bowel function  Description  INTERVENTIONS:  - Assess bowel function  - Encourage oral fluids to ensure adequate hydration  - Administer IV fluids if ordered to ensure adequate hydration  - Administer ordered medications as needed  - Encourage mobilization and activity  - Consider nutritional services referral to assist patient with adequate nutrition and appropriate food choices  Outcome: Progressing  Goal: Maintains adequate nutritional intake  Description  INTERVENTIONS:  - Monitor percentage of each meal consumed  - Identify factors contributing to decreased intake, treat as appropriate  - Assist with meals as needed  - Monitor I&O, weight, and lab values if indicated  - Obtain nutrition services referral as needed  Outcome: Progressing

## 2019-11-21 NOTE — ASSESSMENT & PLAN NOTE
· On admission, patient presents approximately 2 days of abdominal pain with associated nausea and vomiting  CT scan is revealing a high-grade small-bowel obstruction, possibly secondary to adhesions  · On my encounter patient appears comfortable  Denies episodes of nausea or vomiting  Reports improvement in her abdominal discomfort  Requesting for diet  Had bowel movement day before yesterday but none and last 24 hours  NGT noted with drainage but has decreased  · Continue with NP  · Continue IV fluid hydration  · Electrolytes are supplemented  · Nutrition consult secondary to malnutrition  · Surgery recommendation noted for conservative management  · If not improving or worsening will consider follow-up with palliative care consult given patient's overall risk with surgical intervention  · Incentive spirometry  · Fall precaution  · Small bowel follow-through study today  · Out of bed to chair and ambulate with assistant as much as tolerated

## 2019-11-21 NOTE — PLAN OF CARE
Problem: OCCUPATIONAL THERAPY ADULT  Goal: Performs self-care activities at highest level of function for planned discharge setting  See evaluation for individualized goals  Description  Treatment Interventions: ADL retraining, Functional transfer training, UE strengthening/ROM, Endurance training, Equipment evaluation/education, Compensatory technique education, Continued evaluation, Energy conservation, Activityengagement          See flowsheet documentation for full assessment, interventions and recommendations  Outcome: Progressing  Note:   Limitation: Decreased ADL status, Decreased UE strength, Decreased endurance, Decreased Safe judgement during ADL, Decreased self-care trans, Decreased high-level ADLs  Prognosis: Good  Assessment: Pt cooperative and agreeable to skilled OT services with focus on improving safety and independence duriing functional mobility and self care skills  Pt seated in recliner upon start of session with daughter present and no c/o pain  Pt performed STS with Min A and use of RW to steady with c's for hand placement  Pt demonstrated good activity tolerance requiring fewer rest breaks during presented tasks  Pt reviewed/ performed HEP to A with improving UB strength and increasing activity tolerance  Pt  educated on use of [address] AE for LB ADL with information provided to daughter, and safe txfer/ mobility technique  Pt performance demonstrated fair carryover of learned techniques and strategies to facilitate safety during self care and daily routine, however pt continues to demonstrate deficits in the areas of self care and functional mobility  Pt would continue to benefit from skilled OT POC to facilitate return to PLOF        OT Discharge Recommendation: Short Term Rehab  OT - OK to Discharge: Yes(when medically cleared)

## 2019-11-21 NOTE — PROGRESS NOTES
Progress Note - General Surgery   Ruben Cesar 80 y o  female MRN: [id number]  Unit/Bed#: -01 Encounter: [id number]    Assessment/Plan  SBO, most likely due to adhesive disease  /24, 50 ml this am, it is now brown   SBFT today    Pt states that she is not passing gas, and there has not been a bowel movement in last 24 hrs  She denies pain  Case Management: placement at [facility] after discussion with family  Chief Complaint: I don't have pain like I did, no complaints of cramping this morning  I didn't pass gas and have not had a bowel movement  Objective/Exam:  Pt lying in bed and looks comfortable   NGT drainage is brown  No maroon color   No leukocytosis   Hbg is stable    General Appearance:    Alert and orientated x 3, cooperative, no distress   Lungs:     Clear to auscultation bilaterally, respirations unlabored    Heart:    Regular rate and rhythm   Abdomen:    soft, nontender, NBS  She does have voluntary guarding that when she relaxes she is soft and nontender             Extremities:   Extremities normal,  no cyanosis or edema   Pulses:   2+ and symmetric all extremities, no calf tenderness   Skin:   Skin color, texture, turgor normal, no rashes or lesions   Neurologic:   CNII-XII intact, normal strength, sensation and reflexes     Throughout, affect appropriate       Blood pressure 118/56, pulse 79, temperature 98 5 °F (36 9 °C), resp  rate 18, height 5' 2" (1 575 m), weight 33 2 kg (73 lb 3 1 oz), SpO2 96 %  ,Body mass index is 13 39 kg/m²        Intake/Output Summary (Last 24 hours) at 11/21/2019 0856  Last data filed at 11/21/2019 7998  Gross per 24 hour   Intake 50 ml   Output 1500 ml   Net -1450 ml       Invasive Devices     Peripheral Intravenous Line            Peripheral IV 11/20/19 Right Antecubital less than 1 day          Drain            NG/OG/Enteral Tube Nasogastric 14 Fr Right nares 1 day                                        Labs:   CBC with diff:   Lab Results Component Value Date    WBC 5 88 11/19/2019    HGB 13 0 11/19/2019    HCT 41 8 11/19/2019     (H) 11/19/2019     11/19/2019    MCH 31 5 11/19/2019    MCHC 31 1 (L) 11/19/2019    RDW 14 9 11/19/2019    MPV 10 2 11/19/2019    NRBC 0 11/19/2019   ,   BMP/CMP:  Lab Results   Component Value Date    K 3 8 11/21/2019     11/21/2019    CO2 29 11/21/2019    BUN 8 11/21/2019    CREATININE 0 81 11/21/2019    CALCIUM 8 0 (L) 11/21/2019    AST 15 11/19/2019    ALT <6 (L) 11/19/2019    ALKPHOS 47 11/19/2019    EGFR 65 11/21/2019   ,   Lipid Panel: No results found for: CHOL,   Coags:   Lab Results   Component Value Date    PTT 25 11/18/2019    INR 0 99 11/18/2019   ,     Blood Culture:   Lab Results   Component Value Date    BLOODCX No Growth After 5 Days  05/06/2019    BLOODCX No Growth After 5 Days  05/06/2019   ,   Urinalysis:   Lab Results   Component Value Date    COLORU Yellow 11/18/2019    COLORU Yellow 12/07/2016    CLARITYU Clear 11/18/2019    CLARITYU Transparent 12/07/2016    SPECGRAV 1 010 11/18/2019    SPECGRAV 1 020 12/07/2016    PHUR 6 0 11/18/2019    PHUR 6 0 10/31/2018    PHUR 6 0 12/07/2016    LEUKOCYTESUR Negative 11/18/2019    LEUKOCYTESUR neg 12/07/2016    NITRITE Negative 11/18/2019    NITRITE neg 12/07/2016    PROTEINUA neg 12/07/2016    GLUCOSEU Negative 11/18/2019    KETONESU Trace (A) 11/18/2019    KETONESU neg 12/07/2016    BILIRUBINUR Negative 11/18/2019    BILIRUBINUR neg 12/07/2016    BLOODU Trace-Intact (A) 11/18/2019    BLOODU neg 12/07/2016   ,   Urine Culture: No results found for: URINECX,   Wound Culure: No results found for: WOUNDCULT      Imaging: Xr Chest Portable    Result Date: 11/19/2019  Impression: Stable emphysema  No acute cardiopulmonary pathology  Workstation performed: [id number]     Ct Abdomen Pelvis With Contrast    Result Date: 11/18/2019  Impression: 1    Findings in keeping with high-grade small bowel obstruction with transition point in the right lower quadrant, likely on the basis of adhesions  2   Tree-in-bud and centrilobular nodules at the lung bases as well as partially imaged bronchiectasis in the right middle lobe and the lingula, similar to prior exams  The findings are likely on the basis of chronic infectious/inflammatory processes such as mycobacterial infection  3   Stable 1 8 cm cystic pancreatic head lesion with internal septation  4   Stable 1 6 cm left adrenal nodule  The study was marked in [facility] for immediate notification   Workstation performed: Rip Martins PA-C  11/21/2019

## 2019-11-21 NOTE — OCCUPATIONAL THERAPY NOTE
OCCUPATIONAL THERAPY TREATMENT  NOTE         11/21/19 1245   Restrictions/Precautions   Weight Bearing Precautions Per Order No   Other Precautions Chair Alarm; Bed Alarm;Multiple lines; Fall Risk;Hard of hearing   Pain Assessment   Pain Assessment No/denies pain   Pain Score No Pain   Functional Standing Tolerance   Time 3 mins   Activity txfer training   Comments RW   Bed Mobility   Additional Comments pt rec'd OOB   Transfers   Sit to Stand 4  Minimal assistance   Additional items Assist x 1; Armrests; Increased time required;Verbal cues   Stand to Sit 4  Minimal assistance   Additional items Assist x 1; Armrests; Increased time required;Verbal cues   Additional Comments pt rec'd OOB in recliner   Therapeutic Excerise-Strength   UE Strength Yes   Right Upper Extremity- Strength   R Shoulder Flexion;ABduction; Extension;Horizontal ABduction; External rotation; Internal rotation   R Elbow Elbow flexion;Elbow extension   R Weight/Reps/Sets 10 reps x 2   Left Upper Extremity-Strength   L Shoulder Flexion;ABduction; Extension;Horizontal ABduction; External rotation; Internal rotation   L Elbow Elbow flexion;Elbow extension   L Weights/Reps/Sets 10 reps x 2   Cognition   Overall Cognitive Status Impaired   Arousal/Participation Alert; Cooperative   Attention Within functional limits   Orientation Level Oriented X4   Memory Decreased short term memory;Decreased recall of recent events   Following Commands Follows one step commands with increased time or repetition   Comments Pt agreeable to skilled OT ttx   Additional Activities   Additional Activities Other (Comment)   Additional Activities Comments Ed  on safe txfer technique/ use of LH AE  for LB ADL   Activity Tolerance   Activity Tolerance Patient tolerated treatment well   Medical Staff Made Aware Yes   PATRICK Nichols made aware   Assessment   Assessment Pt cooperative and agreeable to skilled OT services with focus on improving safety and independence duriing functional mobility and self care skills  Pt seated in recliner upon start of session with daughter present and no c/o pain  Pt performed STS with Min A and use of RW to steady with c's for hand placement  Pt demonstrated good activity tolerance requiring fewer rest breaks during presented tasks  Pt reviewed/ performed HEP to A with improving UB strength and increasing activity tolerance  Pt  educated on use of [address] AE for LB ADL with information provided to daughter, and safe txfer/ mobility technique  Pt performance demonstrated fair carryover of learned techniques and strategies to facilitate safety during self care and daily routine, however pt continues to demonstrate deficits in the areas of self care and functional mobility  Pt would continue to benefit from skilled OT POC to facilitate return to PLOF  Plan   Treatment Interventions ADL retraining;Visual perceptual retraining;Functional transfer training;UE strengthening/ROM; Endurance training;Patient/family training;Equipment evaluation/education; Fine motor coordination activities; Compensatory technique education;Continued evaluation; Energy conservation; Activityengagement   Goal Expiration Date 12/04/19   OT Frequency 3-5x/wk   Recommendation   OT Discharge Recommendation Short Term Rehab   OT - OK to Discharge Yes  (when medically cleared)                                                     KAYA Hernadez/DEVON

## 2019-11-22 LAB
ANION GAP SERPL CALCULATED.3IONS-SCNC: 9 MMOL/L (ref 4–13)
BUN SERPL-MCNC: 5 MG/DL (ref 5–25)
CALCIUM SERPL-MCNC: 7.7 MG/DL (ref 8.3–10.1)
CHLORIDE SERPL-SCNC: 101 MMOL/L (ref 100–108)
CO2 SERPL-SCNC: 26 MMOL/L (ref 21–32)
CREAT SERPL-MCNC: 0.71 MG/DL (ref 0.6–1.3)
ERYTHROCYTE [DISTWIDTH] IN BLOOD BY AUTOMATED COUNT: 14.2 % (ref 11.6–15.1)
GFR SERPL CREATININE-BSD FRML MDRD: 76 ML/MIN/1.73SQ M
GLUCOSE SERPL-MCNC: 205 MG/DL (ref 65–140)
HCT VFR BLD AUTO: 43.8 % (ref 34.8–46.1)
HGB BLD-MCNC: 13.9 G/DL (ref 11.5–15.4)
MCH RBC QN AUTO: 31.7 PG (ref 26.8–34.3)
MCHC RBC AUTO-ENTMCNC: 31.7 G/DL (ref 31.4–37.4)
MCV RBC AUTO: 100 FL (ref 82–98)
PLATELET # BLD AUTO: 197 THOUSANDS/UL (ref 149–390)
PMV BLD AUTO: 10.2 FL (ref 8.9–12.7)
POTASSIUM SERPL-SCNC: 4.2 MMOL/L (ref 3.5–5.3)
RBC # BLD AUTO: 4.39 MILLION/UL (ref 3.81–5.12)
SODIUM SERPL-SCNC: 136 MMOL/L (ref 136–145)
WBC # BLD AUTO: 10.46 THOUSAND/UL (ref 4.31–10.16)

## 2019-11-22 PROCEDURE — 80048 BASIC METABOLIC PNL TOTAL CA: CPT | Performed by: STUDENT IN AN ORGANIZED HEALTH CARE EDUCATION/TRAINING PROGRAM

## 2019-11-22 PROCEDURE — 99232 SBSQ HOSP IP/OBS MODERATE 35: CPT | Performed by: SURGERY

## 2019-11-22 PROCEDURE — C9113 INJ PANTOPRAZOLE SODIUM, VIA: HCPCS | Performed by: PHYSICIAN ASSISTANT

## 2019-11-22 PROCEDURE — 85027 COMPLETE CBC AUTOMATED: CPT | Performed by: STUDENT IN AN ORGANIZED HEALTH CARE EDUCATION/TRAINING PROGRAM

## 2019-11-22 PROCEDURE — 99232 SBSQ HOSP IP/OBS MODERATE 35: CPT | Performed by: STUDENT IN AN ORGANIZED HEALTH CARE EDUCATION/TRAINING PROGRAM

## 2019-11-22 RX ORDER — AMLODIPINE BESYLATE 5 MG/1
5 TABLET ORAL DAILY
Status: DISCONTINUED | OUTPATIENT
Start: 2019-11-22 | End: 2019-11-23 | Stop reason: HOSPADM

## 2019-11-22 RX ADMIN — HEPARIN SODIUM 5000 UNITS: 5000 INJECTION INTRAVENOUS; SUBCUTANEOUS at 13:07

## 2019-11-22 RX ADMIN — AMLODIPINE BESYLATE 5 MG: 5 TABLET ORAL at 09:38

## 2019-11-22 RX ADMIN — NICOTINE 1 PATCH: 14 PATCH TRANSDERMAL at 09:39

## 2019-11-22 RX ADMIN — HEPARIN SODIUM 5000 UNITS: 5000 INJECTION INTRAVENOUS; SUBCUTANEOUS at 05:26

## 2019-11-22 RX ADMIN — PANTOPRAZOLE SODIUM 40 MG: 40 INJECTION, POWDER, FOR SOLUTION INTRAVENOUS at 09:38

## 2019-11-22 RX ADMIN — HEPARIN SODIUM 5000 UNITS: 5000 INJECTION INTRAVENOUS; SUBCUTANEOUS at 21:43

## 2019-11-22 NOTE — PROGRESS NOTES
Progress Note Obdulio Cage [birth year], 80 y o  female MRN: [id number]    Unit/Bed#: -01 Encounter: [id number]    Primary Care Provider: Divine Alonzo MD   Date and time admitted to hospital: 11/18/2019 10:20 AM        * Small bowel obstruction ([facility] )  Assessment & Plan  · On admission, patient presents approximately 2 days of abdominal pain with associated nausea and vomiting  CT scan is revealing a high-grade small-bowel obstruction, possibly secondary to adhesions  Small bowel follow-through yesterday showed no signs of obstruction  Patient had small bowel movement yesterday  NG tube with minimal output  NG tube was removed by surgery recommendation  Advanced to clear liquids  Advanced to soft later on if tolerates liquid well  Encourage out of bed to chair as much as tolerated  Encouraged incentive spirometry use  Fall precautions  PT OT recommendation noted for short-term rehab  Malnutrition ([facility] )  Assessment & Plan  Malnutrition Findings:   Malnutrition type: Chronic illness  Degree of Malnutrition: Other severe protein calorie malnutrition    BMI Findings:  BMI Classifications: Underweight < 18 5     Body mass index is 13 31 kg/m²  Patient with a significantly low BMI, nutrition consulted  Low albumin, suspect chronic malnutrition secondary to age and dementia and inadequate caloric intake  She has bitemporal wasting, prominent clavicles and ribs, prominent spinous processes, fat and muscle wasting  Hypertension  Assessment & Plan  Resume home p o  Medication  Continue to monitor vitals  Dementia [facility])  Assessment & Plan  · Patient does have dementia, daughter reports she is pretty forgetful at times at home  · Dementia precautions, reoriented as needed  · Holding donepezil and sertraline for now given NPO  VTE Pharmacologic Prophylaxis:   Pharmacologic: Heparin  Patient Centered Rounds: I have performed bedside rounds with nursing staff today      Discussions with Specialists or Other Care Team Provider:  Surgery    Education and Discussions with Family / Patient:  Patient  Time Spent for Care: 30 minutes  More than 50% of total time spent on counseling and coordination of care as described above  Current Length of Stay: 4 day(s)    Current Patient Status: Inpatient   Certification Statement: The patient will continue to require additional inpatient hospital stay due to Small bowel obstruction improving slowly  Discharge Plan: tbd    Code Status: Level 1 - Full Code      Subjective:   Afebrile, hemodynamically stable  Had bowel movement yesterday  Small bowel follow-through shows no signs of obstruction  Requesting for diet  No other events reported  Objective:     Vitals:   Temp (24hrs), Av 8 °F (37 1 °C), Min:98 1 °F (36 7 °C), Max:99 5 °F (37 5 °C)    Temp:  [98 1 °F (36 7 °C)-99 5 °F (37 5 °C)] 98 1 °F (36 7 °C)  HR:  [] 90  Resp:  [16-18] 18  BP: (138-181)/(66-85) 152/66  SpO2:  [95 %-97 %] 97 %  Body mass index is 13 31 kg/m²  Input and Output Summary (last 24 hours): Intake/Output Summary (Last 24 hours) at 2019 1419  Last data filed at 2019 0941  Gross per 24 hour   Intake 4435 ml   Output 910 ml   Net 3525 ml       Physical Exam:     Physical Exam   Constitutional: No distress  Elderly, frail, cachectic female patient not in acute distress  HENT:   Head: Normocephalic and atraumatic  NG tube with biliary drainage noted  Eyes: Pupils are equal, round, and reactive to light  EOM are normal    Neck: Normal range of motion  Neck supple  Cardiovascular: Normal rate and regular rhythm  Pulmonary/Chest: Effort normal and breath sounds normal  No stridor  No respiratory distress  Abdominal: Soft  Bowel sounds are normal  She exhibits no distension  There is no tenderness  There is no guarding  Musculoskeletal: Normal range of motion  Neurological:   Awake, alert, oriented baseline    Cooperative to exam  Skin: She is not diaphoretic  Psychiatric: Her behavior is normal        Additional Data:     Labs:    Results from last 7 days   Lab Units 11/22/19  0533 11/19/19  0525   WBC Thousand/uL 10 46* 5 88   HEMOGLOBIN g/dL 13 9 13 0   HEMATOCRIT % 43 8 41 8   PLATELETS Thousands/uL 197 209   NEUTROS PCT %  --  63   LYMPHS PCT %  --  17   MONOS PCT %  --  17*   EOS PCT %  --  2     Results from last 7 days   Lab Units 11/22/19  0533  11/19/19  0525   SODIUM mmol/L 136   < > 142   POTASSIUM mmol/L 4 2   < > 3 3*   CHLORIDE mmol/L 101   < > 105   CO2 mmol/L 26   < > 24   BUN mg/dL 5   < > 21   CREATININE mg/dL 0 71   < > 0 77   ANION GAP mmol/L 9   < > 13   CALCIUM mg/dL 7 7*   < > 8 1*   ALBUMIN g/dL  --   --  2 7*   TOTAL BILIRUBIN mg/dL  --   --  0 80   ALK PHOS U/L  --   --  47   ALT U/L  --   --  <6*   AST U/L  --   --  15   GLUCOSE RANDOM mg/dL 205*   < > 67    < > = values in this interval not displayed  Results from last 7 days   Lab Units 11/18/19  1040   INR  0 99             Results from last 7 days   Lab Units 11/18/19  1118   LACTIC ACID mmol/L 1 3           * I Have Reviewed All Lab Data Listed Above  * Additional Pertinent Lab Tests Reviewed:  All Labs Within Last 24 Hours Reviewed        Recent Cultures (last 7 days):           Last 24 Hours Medication List:     Current Facility-Administered Medications:  acetaminophen 650 mg Oral Q6H PRN Lauryn Bailey PA-C   aluminum-magnesium hydroxide-simethicone 30 mL Oral Q6H PRN Leanne Roger PA-C   amLODIPine 5 mg Oral Daily Tomás LOREDO MD   heparin (porcine) 5,000 Units Subcutaneous [facility] Leanne Roger PA-C   hydrALAZINE 10 mg Intravenous Q6H PRN Leanne Roger PA-C   Lidocaine Viscous HCl 10 mL Swish & Swallow TID PRN Melodie Bautista PA-C   metoprolol 5 mg Intravenous Q8H PRN Cindi LOREDO MD   nicotine 1 patch Transdermal Daily Leanne Roger PA-C   ondansetron 4 mg Intravenous Q6H PRN Leanne Roger PA-C   pantoprazole 40 mg Intravenous Q24H [place] Yeung   phenol 1 spray Mouth/Throat Q2H PRN Felicitas Johnson PA-C        Today, Patient Was Seen By: Cristhian Ortiz MD    ** Please Note: Dictation voice to text software may have been used in the creation of this document   **

## 2019-11-22 NOTE — ASSESSMENT & PLAN NOTE
Malnutrition Findings:   Malnutrition type: Chronic illness  Degree of Malnutrition: Other severe protein calorie malnutrition    BMI Findings:  BMI Classifications: Underweight < 18 5     Body mass index is 13 31 kg/m²  Patient with a significantly low BMI, nutrition consulted  Low albumin, suspect chronic malnutrition secondary to age and dementia and inadequate caloric intake  She has bitemporal wasting, prominent clavicles and ribs, prominent spinous processes, fat and muscle wasting

## 2019-11-22 NOTE — PHYSICAL THERAPY NOTE
Physical Therapy Cancellation Note    Chart review performed  At this time, PT treatment cancelled secondary to patient refusal  PT will follow and provide interventions as appropriate      Manual Sale, PT, DPT

## 2019-11-22 NOTE — ASSESSMENT & PLAN NOTE
· On admission, patient presents approximately 2 days of abdominal pain with associated nausea and vomiting  CT scan is revealing a high-grade small-bowel obstruction, possibly secondary to adhesions  Small bowel follow-through yesterday showed no signs of obstruction  Patient had small bowel movement yesterday  NG tube with minimal output  NG tube was removed by surgery recommendation  Advanced to clear liquids  Advanced to soft later on if tolerates liquid well  Encourage out of bed to chair as much as tolerated  Encouraged incentive spirometry use  Fall precautions  PT OT recommendation noted for short-term rehab

## 2019-11-22 NOTE — PROGRESS NOTES
Progress Note - General Surgery   Ruben Cesar 80 y o  female MRN: [id number]  Unit/Bed#: -01 Encounter: [id number]    Assessment/Plan  Pt seen with DR Zambrano at 10:20 and surgical team at bedside     SBO   Pt had small bowel movement yesterday  Minimal output from NGT  She states she is hungry    -d/c NGT (10 cc output)  -CLD and  Ensue clear, will advance to soft surgical this afternoon if she tolerates this   -supervised IS with coaching   -cont ambulation and OOB     Chief Complaint: I would like to eat something  Objective/Exam:  Lying in bed at time of exam, nursing assisted her to commond  General Appearance:    Alert and orientated x 3, cooperative, no distress   Lungs:     Anterior rhonchi, no wheeze,  bilaterally, respirations unlabored    Heart:    Regular rate and rhythm   Abdomen:     Soft nontender, NBS          Extremities:   Extremities normal,  no cyanosis or edema   Pulses:   2+ and symmetric all extremities, no calf tenderness   Skin:   Skin color, texture, turgor normal, no rashes or lesions   Neurologic:   CNII-XII intact, normal strength, sensation and reflexes     Throughout, affect appropriate       Blood pressure 152/66, pulse 90, temperature 98 1 °F (36 7 °C), resp  rate 18, height 5' 2" (1 575 m), weight 33 kg (72 lb 12 oz), SpO2 97 %  ,Body mass index is 13 31 kg/m²        Intake/Output Summary (Last 24 hours) at 11/22/2019 0821  Last data filed at 11/22/2019 0738  Gross per 24 hour   Intake 3618 75 ml   Output 1235 ml   Net 2383 75 ml       Invasive Devices     Peripheral Intravenous Line            Peripheral IV 11/20/19 Right Antecubital 1 day          Drain            NG/OG/Enteral Tube Nasogastric 14 Fr Right nares 2 days                                        Labs:   CBC with diff:   Lab Results   Component Value Date    WBC 10 46 (H) 11/22/2019    HGB 13 9 11/22/2019    HCT 43 8 11/22/2019     (H) 11/22/2019     11/22/2019    MCH 31 7 11/22/2019 MCHC 31 7 11/22/2019    RDW 14 2 11/22/2019    MPV 10 2 11/22/2019    NRBC 0 11/19/2019   ,   BMP/CMP:  Lab Results   Component Value Date    K 4 2 11/22/2019     11/22/2019    CO2 26 11/22/2019    BUN 5 11/22/2019    CREATININE 0 71 11/22/2019    CALCIUM 7 7 (L) 11/22/2019    AST 15 11/19/2019    ALT <6 (L) 11/19/2019    ALKPHOS 47 11/19/2019    EGFR 76 11/22/2019   ,   Lipid Panel: No results found for: CHOL,   Coags:   Lab Results   Component Value Date    PTT 25 11/18/2019    INR 0 99 11/18/2019   ,     Blood Culture:   Lab Results   Component Value Date    BLOODCX No Growth After 5 Days  05/06/2019    BLOODCX No Growth After 5 Days  05/06/2019   ,   Urinalysis:   Lab Results   Component Value Date    COLORU Yellow 11/18/2019    COLORU Yellow 12/07/2016    CLARITYU Clear 11/18/2019    CLARITYU Transparent 12/07/2016    SPECGRAV 1 010 11/18/2019    SPECGRAV 1 020 12/07/2016    PHUR 6 0 11/18/2019    PHUR 6 0 10/31/2018    PHUR 6 0 12/07/2016    LEUKOCYTESUR Negative 11/18/2019    LEUKOCYTESUR neg 12/07/2016    NITRITE Negative 11/18/2019    NITRITE neg 12/07/2016    PROTEINUA neg 12/07/2016    GLUCOSEU Negative 11/18/2019    KETONESU Trace (A) 11/18/2019    KETONESU neg 12/07/2016    BILIRUBINUR Negative 11/18/2019    BILIRUBINUR neg 12/07/2016    BLOODU Trace-Intact (A) 11/18/2019    BLOODU neg 12/07/2016   ,   Urine Culture: No results found for: URINECX,   Wound Culure: No results found for: WOUNDCULT      Imaging: Xr Chest Portable    Result Date: 11/19/2019  Impression: Stable emphysema  No acute cardiopulmonary pathology  Workstation performed: [id number]     Fl Small Bowel    Result Date: 11/21/2019  Impression: No evidence of small bowel obstruction Contrast reaches the colon in 2 hours 15 minutes Normal small bowel transit time Workstation performed: [id number]     Ct Abdomen Pelvis With Contrast    Result Date: 11/18/2019  Impression: 1    Findings in keeping with high-grade small bowel obstruction with transition point in the right lower quadrant, likely on the basis of adhesions  2   Tree-in-bud and centrilobular nodules at the lung bases as well as partially imaged bronchiectasis in the right middle lobe and the lingula, similar to prior exams  The findings are likely on the basis of chronic infectious/inflammatory processes such as mycobacterial infection  3   Stable 1 8 cm cystic pancreatic head lesion with internal septation  4   Stable 1 6 cm left adrenal nodule  The study was marked in [facility] for immediate notification   Workstation performed: Ariana Duque PA-C  11/22/2019

## 2019-11-22 NOTE — PLAN OF CARE
Problem: Potential for Falls  Goal: Patient will remain free of falls  Description  INTERVENTIONS:  - Assess patient frequently for physical needs  -  Identify cognitive and physical deficits and behaviors that affect risk of falls  -  [place] fall precautions as indicated by assessment   - Educate patient/family on patient safety including physical limitations  - Instruct patient to call for assistance with activity based on assessment  - Modify environment to reduce risk of injury  - Consider OT/PT consult to assist with strengthening/mobility  Outcome: Progressing     Problem: Prexisting or High Potential for Compromised Skin Integrity  Goal: Skin integrity is maintained or improved  Description  INTERVENTIONS:  - Identify patients at risk for skin breakdown  - Assess and monitor skin integrity  - Assess and monitor nutrition and hydration status  - Monitor labs   - Assess for incontinence   - Turn and reposition patient  - Assist with mobility/ambulation  - Relieve pressure over bony prominences  - Avoid friction and shearing  - Provide appropriate hygiene as needed including keeping skin clean and dry  - Evaluate need for skin moisturizer/barrier cream  - Collaborate with interdisciplinary team   - Patient/family teaching  - Consider wound care consult   Outcome: Progressing     Problem: Nutrition/Hydration-ADULT  Goal: Nutrient/Hydration intake appropriate for improving, restoring or maintaining nutritional needs  Description  Monitor and assess patient's nutrition/hydration status for malnutrition  Collaborate with interdisciplinary team and initiate plan and interventions as ordered  Monitor patient's weight and dietary intake as ordered or per policy  Utilize nutrition screening tool and intervene as necessary  Determine patient's food preferences and provide high-protein, high-caloric foods as appropriate       INTERVENTIONS:  - Monitor oral intake, urinary output, labs, and treatment plans  - Assess nutrition and hydration status and recommend course of action  - Evaluate amount of meals eaten  - Assist patient with eating if necessary   - Allow adequate time for meals  - Recommend/ encourage appropriate diets, oral nutritional supplements, and vitamin/mineral supplements  - Order, calculate, and assess calorie counts as needed  - Recommend, monitor, and adjust tube feedings and TPN/PPN based on assessed needs  - Assess need for intravenous fluids  - Provide specific nutrition/hydration education as appropriate  - Include patient/family/caregiver in decisions related to nutrition  Outcome: Progressing     Problem: GASTROINTESTINAL - ADULT  Goal: Minimal or absence of nausea and/or vomiting  Description  INTERVENTIONS:  - Administer IV fluids if ordered to ensure adequate hydration  - Maintain NPO status until nausea and vomiting are resolved  - Nasogastric tube if ordered  - Administer ordered antiemetic medications as needed  - Provide nonpharmacologic comfort measures as appropriate  - Advance diet as tolerated, if ordered  - Consider nutrition services referral to assist patient with adequate nutrition and appropriate food choices  Outcome: Progressing  Goal: Maintains or returns to baseline bowel function  Description  INTERVENTIONS:  - Assess bowel function  - Encourage oral fluids to ensure adequate hydration  - Administer IV fluids if ordered to ensure adequate hydration  - Administer ordered medications as needed  - Encourage mobilization and activity  - Consider nutritional services referral to assist patient with adequate nutrition and appropriate food choices  Outcome: Progressing  Goal: Maintains adequate nutritional intake  Description  INTERVENTIONS:  - Monitor percentage of each meal consumed  - Identify factors contributing to decreased intake, treat as appropriate  - Assist with meals as needed  - Monitor I&O, weight, and lab values if indicated  - Obtain nutrition services referral as needed  Outcome: Progressing

## 2019-11-22 NOTE — SOCIAL WORK
STEPHEN contacted [facility] via telephone and left vm to discuss STR  CM informed her that [facility] and [address] [place] [address] reviewing  CM spoke with Sakshi Rose from [facility]  She said that she can accept pt but wants her to know that they are a non smoking facility  STEPHEN spoke with Fermin Rosa at [address] [place] [address] and she stated that she is reviewing but if pt chooses to come to her facility, she can accept her on Sunday

## 2019-11-23 VITALS
HEART RATE: 89 BPM | DIASTOLIC BLOOD PRESSURE: 72 MMHG | BODY MASS INDEX: 13.39 KG/M2 | HEIGHT: 62 IN | RESPIRATION RATE: 18 BRPM | TEMPERATURE: 98.9 F | WEIGHT: 72.75 LBS | SYSTOLIC BLOOD PRESSURE: 144 MMHG | OXYGEN SATURATION: 95 %

## 2019-11-23 PROCEDURE — 99232 SBSQ HOSP IP/OBS MODERATE 35: CPT | Performed by: SURGERY

## 2019-11-23 PROCEDURE — C9113 INJ PANTOPRAZOLE SODIUM, VIA: HCPCS | Performed by: PHYSICIAN ASSISTANT

## 2019-11-23 PROCEDURE — 99239 HOSP IP/OBS DSCHRG MGMT >30: CPT | Performed by: STUDENT IN AN ORGANIZED HEALTH CARE EDUCATION/TRAINING PROGRAM

## 2019-11-23 RX ORDER — NICOTINE 21 MG/24HR
1 PATCH, TRANSDERMAL 24 HOURS TRANSDERMAL DAILY
Qty: 7 PATCH | Refills: 0
Start: 2019-11-24 | End: 2019-11-23

## 2019-11-23 RX ORDER — NICOTINE 21 MG/24HR
1 PATCH, TRANSDERMAL 24 HOURS TRANSDERMAL DAILY
Qty: 7 PATCH | Refills: 0
Start: 2019-11-24

## 2019-11-23 RX ADMIN — HEPARIN SODIUM 5000 UNITS: 5000 INJECTION INTRAVENOUS; SUBCUTANEOUS at 05:11

## 2019-11-23 RX ADMIN — NICOTINE 1 PATCH: 14 PATCH TRANSDERMAL at 09:07

## 2019-11-23 RX ADMIN — AMLODIPINE BESYLATE 5 MG: 5 TABLET ORAL at 09:06

## 2019-11-23 RX ADMIN — PANTOPRAZOLE SODIUM 40 MG: 40 INJECTION, POWDER, FOR SOLUTION INTRAVENOUS at 09:06

## 2019-11-23 NOTE — PROGRESS NOTES
Progress Note - General Surgery   Dolly Godfrey 80 y o  female MRN: [id number]  Unit/Bed#: -01 Encounter: [id number]    Assessment/Plan  Dolly Godfrey is a 80 y o  female     Small bowel obstruction, resolved  Small bowel follow through showed no evidence of obstruction  Diet advanced to solids yesterday afternoon, tolerating without nausea or vomiting   Doing well from surgical perspective  Surgery will sign off  No further surgical intervention indicated at this time as bowel obstruction has resolved  Home per medicine team     Subjective/Objective     Subjective: No acute events overnight  Objective:     Blood pressure 144/72, pulse 89, temperature 98 9 °F (37 2 °C), resp  rate 18, height 5' 2" (1 575 m), weight 33 kg (72 lb 12 oz), SpO2 95 %  ,Body mass index is 13 31 kg/m²  Intake/Output Summary (Last 24 hours) at 11/23/2019 0944  Last data filed at 11/23/2019 0511  Gross per 24 hour   Intake 600 ml   Output 500 ml   Net 100 ml       Invasive Devices     Peripheral Intravenous Line            Peripheral IV 11/20/19 Right Antecubital 3 days                Physical Exam: /72   Pulse 89   Temp 98 9 °F (37 2 °C)   Resp 18   Ht 5' 2" (1 575 m)   Wt 33 kg (72 lb 12 oz)   SpO2 95%   BMI 13 31 kg/m²   General appearance: alert and alert and oriented, no acute distress, frail and thin appearing  Lungs: diminished breath sounds  Heart: regular rate and rhythm, S1, S2 normal, no murmur, click, rub or gallop  Abdomen: soft, non-distended, non-tender to palpation, palpable old suture from prior surgery  Extremities: extremities normal, warm and well-perfused; no cyanosis, clubbing, or edema    Lab, Imaging and other studies:I have personally reviewed pertinent lab results       VTE Pharmacologic Prophylaxis: Heparin  VTE Mechanical Prophylaxis: sequential compression device    Recent Results (from the past 36 hour(s))   CBC and Platelet    Collection Time: 11/22/19  5:33 AM   Result Value Ref Range    WBC 10 46 (H) 4 31 - 10 16 Thousand/uL    RBC 4 39 3 81 - 5 12 Million/uL    Hemoglobin 13 9 11 5 - 15 4 g/dL    Hematocrit 43 8 34 8 - 46 1 %     (H) 82 - 98 fL    MCH 31 7 26 8 - 34 3 pg    MCHC 31 7 31 4 - 37 4 g/dL    RDW 14 2 11 6 - 15 1 %    Platelets 987 150 - 768 Thousands/uL    MPV 10 2 8 9 - 12 7 fL   Basic metabolic panel    Collection Time: 11/22/19  5:33 AM   Result Value Ref Range    Sodium 136 136 - 145 mmol/L    Potassium 4 2 3 5 - 5 3 mmol/L    Chloride 101 100 - 108 mmol/L    CO2 26 21 - 32 mmol/L    ANION GAP 9 4 - 13 mmol/L    BUN 5 5 - 25 mg/dL    Creatinine 0 71 0 60 - 1 30 mg/dL    Glucose 205 (H) 65 - 140 mg/dL    Calcium 7 7 (L) 8 3 - 10 1 mg/dL    eGFR 76 ml/min/1 73sq m

## 2019-11-23 NOTE — SOCIAL WORK
Cm informed patient is clear for discharge today  Cm spoke with patient daughter Jamie Carolina who stated family would prefer [facility] and patient does not have an issue with a non-smoking facility  Daughter requested cm to schedule transport to [facility]  Cm contacted [facility] liaison Chapin Lozano who stated facility is able to accept today  Nurse stated patient is appropriate for   SLETS coordinating transport MD aware

## 2019-11-23 NOTE — DISCHARGE SUMMARY
Discharge- Jagruti Pap [birth year], 80 y o  female MRN: [id number]    Unit/Bed#: -01 Encounter: [id number]    Primary Care Provider: Rroo Nichols MD   Date and time admitted to hospital: 11/18/2019 10:20 AM        * Small bowel obstruction ([facility] )  Assessment & Plan  · On admission, patient presents approximately 2 days of abdominal pain with associated nausea and vomiting  CT scan is revealing a high-grade small-bowel obstruction, possibly secondary to adhesions  Now resolved  Small bowel follow-through 2 days ago showed no signs of obstruction  Has been having bowel movement without any complaints  Tolerating diet well  Encourage out of bed to chair as much as tolerated  Encouraged incentive spirometry use  Cleared by surgery for discharge  Fall precautions  PT OT recommendation noted for short-term rehab  Patient and family agreeable  Malnutrition ([facility] )  Assessment & Plan  Malnutrition Findings:   Malnutrition type: Chronic illness  Degree of Malnutrition: Other severe protein calorie malnutrition    BMI Findings:  BMI Classifications: Underweight < 18 5     Body mass index is 13 31 kg/m²  Patient with a significantly low BMI,  Low albumin, suspect chronic malnutrition secondary to age and dementia and inadequate caloric intake  She has bitemporal wasting, prominent clavicles and ribs, prominent spinous processes, fat and muscle wasting  Encouraged adequate p o  Intake as well as protein supplement with meals  Hypertension  Assessment & Plan  Resume home p o  Medication  Continue to monitor vitals  Close follow-up with PCP  Dementia [facility])  Assessment & Plan  · Patient does have dementia, daughter reports she is pretty forgetful at times at home  · Dementia precautions, reoriented as needed  · Resume donepezil and sertraline  · Close follow-up with PCP          Discharging Physician / Practitioner: Jomar Gerber MD  PCP: Roro Nichols MD  Admission Date:   Admission Orders (From admission, onward)     Ordered        11/18/19 1359  Inpatient Admission  Once                   Discharge Date: 11/23/19    Resolved Problems  Date Reviewed: 11/23/2019    None          Consultations During Hospital Stay:  · Surgery    Significant Findings / Test Results:   · CT abdomen pelvis report noted with high-grade small bowel obstruction  Incidental Findings:   · CT abdomen pelvis report noted with tree-in-bud and centrilobular nodules at the lung bases as well as partially imaged bronchiectasis in the right middle lobe and lingula similar to prior exam   Likely chronic  · Stable 1 8 cm pancreatic head lesion  Stable 1 6 cm left adrenal nodule  · Recommended to follow up with primary care provider for surveillance for above  Reason for Admission:  Nausea, vomiting, abdominal pain  Small bowel obstruction    Hospital Course: Rose Mary Moss is a 80 y o  female patient who originally presented to the hospital on 11/18/2019 due to complaining of nausea, vomiting, abdominal pain for 2 days  Further test patient was noted to have high-grade small-bowel obstruction possibly secondary to adhesions as evident on CT abdomen pelvis report noted  Patient was seen by surgery team   Recommended conservative management with NG tube decompression and bowel rest   Electrolytes were supplemented as needed  Patient's  bowel function improved  Currently having regular bowel movements  Tolerating diet well  Patient denies abdominal pain, nausea, vomiting, chest pain, dyspnea, any other new complaints  Reports feeling much better and stable at her baseline  Cleared by surgery for discharge  Plan is to go to rehab  Patient and daughter agreeable  No other events reported  Refer to earlier notes for further clarification  I recommended close follow-up with primary care provider outpatient  Also consider following up with palliative care outpatient      Please see above list of diagnoses and related plan for additional information  Condition at Discharge: stable     Discharge Day Visit / Exam:     Subjective:  Afebrile, hemodynamically stable  Seen sitting in a chair  Having regular bowel movement  Tolerating diet well  Denies abdominal pain, nausea, vomiting, chest pain, fever, chills, or any other new complaints  No other events reported  Vitals: Blood Pressure: 144/72 (11/23/19 0724)  Pulse: 89 (11/23/19 0724)  Temperature: 98 9 °F (37 2 °C) (11/23/19 0724)  Temp Source: Oral (11/19/19 1551)  Respirations: 18 (11/23/19 0724)  Height: 5' 2" (157 5 cm) (11/19/19 1626)  Weight - Scale: 33 kg (72 lb 12 oz) (11/22/19 0534)  SpO2: 95 % (11/23/19 0724)  Exam:   Physical Exam   Constitutional: No distress  Elderly, frail, cachectic female patient not in acute distress  HENT:   Head: Normocephalic and atraumatic  Eyes: Pupils are equal, round, and reactive to light  EOM are normal    Neck: Normal range of motion  Neck supple  Cardiovascular: Normal rate and regular rhythm  Pulmonary/Chest: Effort normal and breath sounds normal  No stridor  No respiratory distress  Abdominal: Soft  Bowel sounds are normal  She exhibits no distension  There is no tenderness  There is no guarding  Musculoskeletal: Normal range of motion  Neurological:   Awake, alert, oriented baseline  Cooperative to exam    Skin: She is not diaphoretic  Psychiatric: Her behavior is normal        Discussion with Family: spoke with daughter  Discharge instructions/Information to patient and family:   See after visit summary for information provided to patient and family  Provisions for Follow-Up Care:  See after visit summary for information related to follow-up care and any pertinent home health orders        Disposition:     Other: Rehab    For Discharges to [address] [facility]:   · Not Applicable to this Patient - Not Applicable to this Patient    Planned Readmission:  None     Discharge Statement:  I spent 35 minutes discharging the patient  This time was spent on the day of discharge  I had direct contact with the patient on the day of discharge  Greater than 50% of the total time was spent examining patient, answering all patient questions, arranging and discussing plan of care with patient as well as directly providing post-discharge instructions  Additional time then spent on discharge activities  Discharge Medications:  See after visit summary for reconciled discharge medications provided to patient and family        ** Please Note: This note has been constructed using a voice recognition system **

## 2019-11-23 NOTE — ASSESSMENT & PLAN NOTE
· On admission, patient presents approximately 2 days of abdominal pain with associated nausea and vomiting  CT scan is revealing a high-grade small-bowel obstruction, possibly secondary to adhesions  Small bowel follow-through 2 days ago showed no signs of obstruction  Has been having bowel movement without any complaints  Tolerating diet well  Encourage out of bed to chair as much as tolerated  Encouraged incentive spirometry use  Cleared by surgery for discharge  Fall precautions  PT OT recommendation noted for short-term rehab  Patient and family agreeable

## 2019-11-23 NOTE — INCIDENTAL FINDINGS
The following findings require follow up:  Radiographic finding   Finding:       Tree-in-bud and centrilobular nodules at the lung bases as well as partially imaged bronchiectasis in the right middle lobe and the lingula, similar to prior exams  The findings are likely on the basis of chronic infectious/inflammatory processes   such as mycobacterial infection       Stable 1 8 cm cystic pancreatic head lesion with internal septation       Stable 1 6 cm left adrenal nodule      Recommended close follow-up with primary care provider for surveillance of above incidental finding

## 2019-11-23 NOTE — NURSING NOTE
Report given to receiving facility Vikki @ [phone]  Nurse Madai Skinner was updated  Fax with d/c documentation sent

## 2019-11-23 NOTE — SOCIAL WORK
MARTITA able to provide transport at 130 to [facility] contacted patient daughter regarding this  No objections to discharge      Patient is aware

## 2019-11-23 NOTE — ASSESSMENT & PLAN NOTE
· Patient does have dementia, daughter reports she is pretty forgetful at times at home  · Dementia precautions, reoriented as needed    · Resume donepezil and sertraline

## 2019-11-25 ENCOUNTER — TRANSITIONAL CARE MANAGEMENT (OUTPATIENT)
Dept: FAMILY MEDICINE CLINIC | Facility: MEDICAL CENTER | Age: 84
End: 2019-11-25

## 2020-03-19 ENCOUNTER — APPOINTMENT (EMERGENCY)
Dept: RADIOLOGY | Facility: HOSPITAL | Age: 85
End: 2020-03-19
Payer: MEDICARE

## 2020-03-19 ENCOUNTER — APPOINTMENT (EMERGENCY)
Dept: CT IMAGING | Facility: HOSPITAL | Age: 85
End: 2020-03-19
Payer: MEDICARE

## 2020-03-19 ENCOUNTER — HOSPITAL ENCOUNTER (OUTPATIENT)
Facility: HOSPITAL | Age: 85
Setting detail: OBSERVATION
Discharge: NURSING, REHAB OR LONG-TERM CARE | End: 2020-03-20
Attending: EMERGENCY MEDICINE | Admitting: INTERNAL MEDICINE
Payer: MEDICARE

## 2020-03-19 DIAGNOSIS — R06.00 DYSPNEA: Primary | ICD-10-CM

## 2020-03-19 PROBLEM — R20.2 NUMBNESS AND TINGLING OF FOOT: Status: ACTIVE | Noted: 2020-03-19

## 2020-03-19 PROBLEM — R20.0 NUMBNESS AND TINGLING OF FOOT: Status: ACTIVE | Noted: 2020-03-19

## 2020-03-19 LAB
ALBUMIN SERPL BCP-MCNC: 2.6 G/DL (ref 3.5–5)
ALP SERPL-CCNC: 64 U/L (ref 46–116)
ALT SERPL W P-5'-P-CCNC: 9 U/L (ref 12–78)
ANION GAP SERPL CALCULATED.3IONS-SCNC: 7 MMOL/L (ref 4–13)
AST SERPL W P-5'-P-CCNC: 12 U/L (ref 5–45)
BACTERIA UR QL AUTO: ABNORMAL /HPF
BASOPHILS # BLD AUTO: 0.05 THOUSANDS/ΜL (ref 0–0.1)
BASOPHILS NFR BLD AUTO: 0 % (ref 0–1)
BILIRUB SERPL-MCNC: 0.4 MG/DL (ref 0.2–1)
BILIRUB UR QL STRIP: NEGATIVE
BUN SERPL-MCNC: 16 MG/DL (ref 5–25)
CALCIUM SERPL-MCNC: 8.4 MG/DL (ref 8.3–10.1)
CHLORIDE SERPL-SCNC: 105 MMOL/L (ref 100–108)
CLARITY UR: CLEAR
CO2 SERPL-SCNC: 28 MMOL/L (ref 21–32)
COLOR UR: YELLOW
CREAT SERPL-MCNC: 0.84 MG/DL (ref 0.6–1.3)
EOSINOPHIL # BLD AUTO: 0.07 THOUSAND/ΜL (ref 0–0.61)
EOSINOPHIL NFR BLD AUTO: 1 % (ref 0–6)
ERYTHROCYTE [DISTWIDTH] IN BLOOD BY AUTOMATED COUNT: 15.8 % (ref 11.6–15.1)
GFR SERPL CREATININE-BSD FRML MDRD: 61 ML/MIN/1.73SQ M
GLUCOSE SERPL-MCNC: 85 MG/DL (ref 65–140)
GLUCOSE UR STRIP-MCNC: NEGATIVE MG/DL
HCT VFR BLD AUTO: 42.6 % (ref 34.8–46.1)
HGB BLD-MCNC: 12.4 G/DL (ref 11.5–15.4)
HGB UR QL STRIP.AUTO: NEGATIVE
IMM GRANULOCYTES # BLD AUTO: 0.05 THOUSAND/UL (ref 0–0.2)
IMM GRANULOCYTES NFR BLD AUTO: 0 % (ref 0–2)
KETONES UR STRIP-MCNC: NEGATIVE MG/DL
LEUKOCYTE ESTERASE UR QL STRIP: ABNORMAL
LYMPHOCYTES # BLD AUTO: 1.41 THOUSANDS/ΜL (ref 0.6–4.47)
LYMPHOCYTES NFR BLD AUTO: 12 % (ref 14–44)
MCH RBC QN AUTO: 28.4 PG (ref 26.8–34.3)
MCHC RBC AUTO-ENTMCNC: 29.1 G/DL (ref 31.4–37.4)
MCV RBC AUTO: 98 FL (ref 82–98)
MONOCYTES # BLD AUTO: 0.72 THOUSAND/ΜL (ref 0.17–1.22)
MONOCYTES NFR BLD AUTO: 6 % (ref 4–12)
NEUTROPHILS # BLD AUTO: 9.69 THOUSANDS/ΜL (ref 1.85–7.62)
NEUTS SEG NFR BLD AUTO: 81 % (ref 43–75)
NITRITE UR QL STRIP: NEGATIVE
NON-SQ EPI CELLS URNS QL MICRO: ABNORMAL /HPF
NRBC BLD AUTO-RTO: 0 /100 WBCS
NT-PROBNP SERPL-MCNC: 211 PG/ML
PH UR STRIP.AUTO: 7.5 [PH]
PLATELET # BLD AUTO: 417 THOUSANDS/UL (ref 149–390)
PMV BLD AUTO: 8.9 FL (ref 8.9–12.7)
POTASSIUM SERPL-SCNC: 4.2 MMOL/L (ref 3.5–5.3)
PROT SERPL-MCNC: 6.9 G/DL (ref 6.4–8.2)
PROT UR STRIP-MCNC: NEGATIVE MG/DL
RBC # BLD AUTO: 4.37 MILLION/UL (ref 3.81–5.12)
RBC #/AREA URNS AUTO: ABNORMAL /HPF
SODIUM SERPL-SCNC: 140 MMOL/L (ref 136–145)
SP GR UR STRIP.AUTO: 1.01 (ref 1–1.03)
TROPONIN I SERPL-MCNC: <0.02 NG/ML
TSH SERPL DL<=0.05 MIU/L-ACNC: 1.24 UIU/ML (ref 0.36–3.74)
UROBILINOGEN UR QL STRIP.AUTO: 0.2 E.U./DL
WBC # BLD AUTO: 11.99 THOUSAND/UL (ref 4.31–10.16)
WBC #/AREA URNS AUTO: ABNORMAL /HPF

## 2020-03-19 PROCEDURE — 70450 CT HEAD/BRAIN W/O DYE: CPT

## 2020-03-19 PROCEDURE — 93005 ELECTROCARDIOGRAM TRACING: CPT

## 2020-03-19 PROCEDURE — 84484 ASSAY OF TROPONIN QUANT: CPT | Performed by: PHYSICIAN ASSISTANT

## 2020-03-19 PROCEDURE — 36415 COLL VENOUS BLD VENIPUNCTURE: CPT | Performed by: PHYSICIAN ASSISTANT

## 2020-03-19 PROCEDURE — 80053 COMPREHEN METABOLIC PANEL: CPT | Performed by: PHYSICIAN ASSISTANT

## 2020-03-19 PROCEDURE — 83880 ASSAY OF NATRIURETIC PEPTIDE: CPT | Performed by: PHYSICIAN ASSISTANT

## 2020-03-19 PROCEDURE — 81001 URINALYSIS AUTO W/SCOPE: CPT | Performed by: PHYSICIAN ASSISTANT

## 2020-03-19 PROCEDURE — 99285 EMERGENCY DEPT VISIT HI MDM: CPT

## 2020-03-19 PROCEDURE — 99285 EMERGENCY DEPT VISIT HI MDM: CPT | Performed by: PHYSICIAN ASSISTANT

## 2020-03-19 PROCEDURE — 84443 ASSAY THYROID STIM HORMONE: CPT | Performed by: PHYSICIAN ASSISTANT

## 2020-03-19 PROCEDURE — 99220 PR INITIAL OBSERVATION CARE/DAY 70 MINUTES: CPT | Performed by: INTERNAL MEDICINE

## 2020-03-19 PROCEDURE — 85025 COMPLETE CBC W/AUTO DIFF WBC: CPT | Performed by: PHYSICIAN ASSISTANT

## 2020-03-19 PROCEDURE — 71046 X-RAY EXAM CHEST 2 VIEWS: CPT

## 2020-03-19 RX ORDER — HEPARIN SODIUM 5000 [USP'U]/ML
5000 INJECTION, SOLUTION INTRAVENOUS; SUBCUTANEOUS EVERY 8 HOURS SCHEDULED
Status: DISCONTINUED | OUTPATIENT
Start: 2020-03-19 | End: 2020-03-20 | Stop reason: HOSPADM

## 2020-03-19 RX ORDER — DONEPEZIL HYDROCHLORIDE 5 MG/1
10 TABLET, FILM COATED ORAL DAILY
Status: DISCONTINUED | OUTPATIENT
Start: 2020-03-20 | End: 2020-03-20 | Stop reason: HOSPADM

## 2020-03-19 RX ORDER — NICOTINE 21 MG/24HR
1 PATCH, TRANSDERMAL 24 HOURS TRANSDERMAL DAILY
Status: DISCONTINUED | OUTPATIENT
Start: 2020-03-20 | End: 2020-03-20 | Stop reason: HOSPADM

## 2020-03-19 RX ORDER — DOCUSATE SODIUM 100 MG/1
100 CAPSULE, LIQUID FILLED ORAL 2 TIMES DAILY PRN
Status: DISCONTINUED | OUTPATIENT
Start: 2020-03-19 | End: 2020-03-20 | Stop reason: HOSPADM

## 2020-03-19 RX ORDER — ONDANSETRON 2 MG/ML
4 INJECTION INTRAMUSCULAR; INTRAVENOUS EVERY 6 HOURS PRN
Status: DISCONTINUED | OUTPATIENT
Start: 2020-03-19 | End: 2020-03-20 | Stop reason: HOSPADM

## 2020-03-19 RX ORDER — SODIUM CHLORIDE, SODIUM LACTATE, POTASSIUM CHLORIDE, CALCIUM CHLORIDE 600; 310; 30; 20 MG/100ML; MG/100ML; MG/100ML; MG/100ML
75 INJECTION, SOLUTION INTRAVENOUS ONCE
Status: COMPLETED | OUTPATIENT
Start: 2020-03-19 | End: 2020-03-19

## 2020-03-19 RX ORDER — ASPIRIN 81 MG/1
81 TABLET ORAL DAILY
Status: DISCONTINUED | OUTPATIENT
Start: 2020-03-20 | End: 2020-03-20 | Stop reason: HOSPADM

## 2020-03-19 RX ORDER — AMLODIPINE BESYLATE 5 MG/1
5 TABLET ORAL DAILY
Status: DISCONTINUED | OUTPATIENT
Start: 2020-03-20 | End: 2020-03-20 | Stop reason: HOSPADM

## 2020-03-19 RX ORDER — CALCIUM CARBONATE 200(500)MG
1000 TABLET,CHEWABLE ORAL DAILY PRN
Status: DISCONTINUED | OUTPATIENT
Start: 2020-03-19 | End: 2020-03-20 | Stop reason: HOSPADM

## 2020-03-19 RX ORDER — ACETAMINOPHEN 325 MG/1
650 TABLET ORAL EVERY 6 HOURS PRN
Status: DISCONTINUED | OUTPATIENT
Start: 2020-03-19 | End: 2020-03-20 | Stop reason: HOSPADM

## 2020-03-19 RX ORDER — IPRATROPIUM BROMIDE AND ALBUTEROL SULFATE 2.5; .5 MG/3ML; MG/3ML
3 SOLUTION RESPIRATORY (INHALATION) EVERY 6 HOURS PRN
Status: DISCONTINUED | OUTPATIENT
Start: 2020-03-19 | End: 2020-03-20 | Stop reason: HOSPADM

## 2020-03-19 RX ORDER — SERTRALINE HYDROCHLORIDE 25 MG/1
25 TABLET, FILM COATED ORAL DAILY
Status: DISCONTINUED | OUTPATIENT
Start: 2020-03-20 | End: 2020-03-20 | Stop reason: HOSPADM

## 2020-03-19 RX ADMIN — SODIUM CHLORIDE, SODIUM LACTATE, POTASSIUM CHLORIDE, AND CALCIUM CHLORIDE 75 ML/HR: .6; .31; .03; .02 INJECTION, SOLUTION INTRAVENOUS at 22:40

## 2020-03-19 NOTE — ED NOTES
Gave patient crackers, jello and pudding with permission from provider       Delano Bird  03/19/20 5618

## 2020-03-19 NOTE — ED PROVIDER NOTES
History  Chief Complaint   Patient presents with    Numbness     Patient c/o bilateral feet numbness that has been ongoing for several months  Patient states"she is having a hard time getting around today"  81 yo female patient here for evaluation of bilateral foot numbness  Chronic issue for her that has been ongoing for months but today seemed more severe  Worse in left foot when compared to right  Now improving  States she can "feel her toes" now  No leg pain  No chest pain  States her daughter called the ambulance today because she was having more difficulty ambulating  History provided by:  Patient   used: No    Medical Problem   Location:  Bilateral feet  Quality:  Numbness  Severity:  Mild  Onset quality:  Gradual  Duration:  5 months  Timing:  Intermittent  Progression:  Unchanged  Chronicity:  Chronic  Associated symptoms: shortness of breath (occasional, chronic, morning only)    Associated symptoms: no abdominal pain, no chest pain, no congestion, no cough, no diarrhea, no fatigue, no fever, no headaches, no myalgias, no nausea, no rash, no rhinorrhea, no sore throat, no vomiting and no wheezing        Prior to Admission Medications   Prescriptions Last Dose Informant Patient Reported?  Taking?   acetaminophen (TYLENOL) 650 mg CR tablet  Self Yes No   Sig: Take 650 mg by mouth every 8 (eight) hours as needed for mild pain   amLODIPine (NORVASC) 5 mg tablet  Self Yes No   Sig: Take 5 mg by mouth daily   aspirin (ASPIRIN ADULT LOW DOSE) 81 mg EC tablet  Self Yes No   Sig: Take by mouth   donepezil (ARICEPT) 10 mg tablet  Self Yes No   Sig: Take 10 mg by mouth daily   nicotine (NICODERM CQ) 14 mg/24hr TD 24 hr patch   No No   Sig: Place 1 patch on the skin daily   sertraline (ZOLOFT) 25 mg tablet  Self Yes No   Sig: Take 25 mg by mouth daily        Facility-Administered Medications: None       Past Medical History:   Diagnosis Date    Current smoker     Dementia ([facility] )     History of tuberculosis 40 years ago treated     Hypertension     Ovarian cyst        Past Surgical History:   Procedure Laterality Date    APPENDECTOMY      CATARACT EXTRACTION      COLON SURGERY Right     COLONOSCOPY      COLONOSCOPY N/A 10/5/2017    Procedure: COLONOSCOPY;  Surgeon: Hans Hernandez MD;  Location: [facility]; Service: Gastroenterology    HYSTERECTOMY      OVARIAN CYST REMOVAL         Family History   Problem Relation Age of Onset    No Known Problems Mother     Hypertension Father      I have reviewed and agree with the history as documented  E-Cigarette/Vaping     E-Cigarette/Vaping Substances     Social History     Tobacco Use    Smoking status: Current Every Day Smoker     Packs/day: 1 00     Types: Cigarettes    Smokeless tobacco: Current User   Substance Use Topics    Alcohol use: Never     Frequency: Never    Drug use: No       Review of Systems   Constitutional: Negative for activity change, appetite change, chills, diaphoresis, fatigue, fever and unexpected weight change  HENT: Negative for congestion, rhinorrhea, sinus pressure, sore throat and trouble swallowing  Eyes: Negative for photophobia and visual disturbance  Respiratory: Positive for shortness of breath (occasional, chronic, morning only)  Negative for apnea, cough, choking, chest tightness, wheezing and stridor  Cardiovascular: Negative for chest pain, palpitations and leg swelling  Gastrointestinal: Negative for abdominal distention, abdominal pain, blood in stool, constipation, diarrhea, nausea and vomiting  Genitourinary: Negative for decreased urine volume, difficulty urinating, dysuria, enuresis, flank pain, frequency, hematuria and urgency  Musculoskeletal: Positive for gait problem  Negative for arthralgias, myalgias, neck pain and neck stiffness  Skin: Negative for color change, pallor, rash and wound  Allergic/Immunologic: Negative      Neurological: Positive for numbness (bilateral feet)  Negative for dizziness, tremors, syncope, weakness, light-headedness and headaches  Hematological: Negative  Psychiatric/Behavioral: Negative  All other systems reviewed and are negative  Physical Exam  Physical Exam   Constitutional: She is oriented to person, place, and time  She appears well-developed and well-nourished  Non-toxic appearance  She does not have a sickly appearance  She does not appear ill  No distress  HENT:   Head: Normocephalic and atraumatic  Eyes: Pupils are equal, round, and reactive to light  EOM and lids are normal    Neck: Normal range of motion  Neck supple  Cardiovascular: Normal rate, regular rhythm, S1 normal, S2 normal, normal heart sounds, intact distal pulses and normal pulses  Exam reveals no gallop, no distant heart sounds, no friction rub and no decreased pulses  No murmur heard  Pulses:       Radial pulses are 2+ on the right side, and 2+ on the left side  Dorsalis pedis pulses are 2+ on the right side, and 2+ on the left side  Pulmonary/Chest: Effort normal and breath sounds normal  No accessory muscle usage  No apnea, no tachypnea and no bradypnea  No respiratory distress  She has no decreased breath sounds  She has no wheezes  She has no rhonchi  She has no rales  Abdominal: Soft  Normal appearance  She exhibits no distension  There is no tenderness  There is no rigidity, no rebound and no guarding  Musculoskeletal: Normal range of motion  She exhibits no edema, tenderness or deformity  Neurological: She is alert and oriented to person, place, and time  No cranial nerve deficit  GCS eye subscore is 4  GCS verbal subscore is 5  GCS motor subscore is 6  Skin: Skin is warm, dry and intact  No rash noted  She is not diaphoretic  No erythema  No pallor  Psychiatric: Her speech is normal    Nursing note and vitals reviewed        Vital Signs  ED Triage Vitals   Temperature Pulse Respirations Blood Pressure SpO2   03/19/20 1650 03/19/20 1645 03/19/20 1645 03/19/20 1645 03/19/20 1645   98 6 °F (37 °C) 86 18 163/71 94 %      Temp Source Heart Rate Source Patient Position - Orthostatic VS BP Location FiO2 (%)   03/19/20 1650 03/19/20 1650 03/19/20 1650 03/19/20 1650 --   Oral Monitor Lying Right arm       Pain Score       --                  Vitals:    03/19/20 1800 03/19/20 1815 03/19/20 1830 03/19/20 2004   BP: 170/72   130/56   Pulse: 79 77 84 91   Patient Position - Orthostatic VS:    Lying         Visual Acuity  Visual Acuity      Most Recent Value   L Pupil Size (mm)  3   R Pupil Size (mm)  3          ED Medications  Medications - No data to display    Diagnostic Studies  Results Reviewed     Procedure Component Value Units Date/Time    NT-BNP PRO [[id number]]  (Normal) Collected:  03/19/20 1713    Lab Status:  Final result Specimen:  Blood from Arm, Left Updated:  03/19/20 2051     NT-proBNP 211 pg/mL     Urine Microscopic [[id number]]  (Abnormal) Collected:  03/19/20 1837    Lab Status:  Final result Specimen:  Urine, Other Updated:  03/19/20 1852     RBC, UA None Seen /hpf      WBC, UA 4-10 /hpf      Epithelial Cells Occasional /hpf      Bacteria, UA Occasional /hpf     UA w Reflex to Microscopic w Reflex to Culture [[id number]]  (Abnormal) Collected:  03/19/20 1837    Lab Status:  Final result Specimen:  Urine, Other Updated:  03/19/20 1844     Color, UA Yellow     Clarity, UA Clear     Specific Gravity, UA 1 015     pH, UA 7 5     Leukocytes, UA Small     Nitrite, UA Negative     Protein, UA Negative mg/dl      Glucose, UA Negative mg/dl      Ketones, UA Negative mg/dl      Urobilinogen, UA 0 2 E U /dl      Bilirubin, UA Negative     Blood, UA Negative    TSH [[id number]]  (Normal) Collected:  03/19/20 1713    Lab Status:  Final result Specimen:  Blood from Arm, Left Updated:  03/19/20 1750     TSH 3RD GENERATON 1 244 uIU/mL     Narrative:       Patients undergoing fluorescein dye angiography may retain small amounts of fluorescein in the body for 48-72 hours post procedure  Samples containing fluorescein can produce falsely depressed TSH values  If the patient had this procedure,a specimen should be resubmitted post fluorescein clearance        Troponin I [[id number]]  (Normal) Collected:  03/19/20 1713    Lab Status:  Final result Specimen:  Blood from Arm, Left Updated:  03/19/20 1741     Troponin I <0 02 ng/mL     Comprehensive metabolic panel [[id number]]  (Abnormal) Collected:  03/19/20 1713    Lab Status:  Final result Specimen:  Blood from Arm, Left Updated:  03/19/20 1740     Sodium 140 mmol/L      Potassium 4 2 mmol/L      Chloride 105 mmol/L      CO2 28 mmol/L      ANION GAP 7 mmol/L      BUN 16 mg/dL      Creatinine 0 84 mg/dL      Glucose 85 mg/dL      Calcium 8 4 mg/dL      AST 12 U/L      ALT 9 U/L      Alkaline Phosphatase 64 U/L      Total Protein 6 9 g/dL      Albumin 2 6 g/dL      Total Bilirubin 0 40 mg/dL      eGFR 61 ml/min/1 73sq m     Narrative:       [place] guidelines for Chronic Kidney Disease (CKD):     Stage 1 with normal or high GFR (GFR > 90 mL/min/1 73 square meters)    Stage 2 Mild CKD (GFR = 60-89 mL/min/1 73 square meters)    Stage 3A Moderate CKD (GFR = 45-59 mL/min/1 73 square meters)    Stage 3B Moderate CKD (GFR = 30-44 mL/min/1 73 square meters)    Stage 4 Severe CKD (GFR = 15-29 mL/min/1 73 square meters)    Stage 5 End Stage CKD (GFR <15 mL/min/1 73 square meters)  Note: GFR calculation is accurate only with a steady state creatinine    CBC and differential [[id number]]  (Abnormal) Collected:  03/19/20 1713    Lab Status:  Final result Specimen:  Blood from Arm, Left Updated:  03/19/20 1724     WBC 11 99 Thousand/uL      RBC 4 37 Million/uL      Hemoglobin 12 4 g/dL      Hematocrit 42 6 %      MCV 98 fL      MCH 28 4 pg      MCHC 29 1 g/dL      RDW 15 8 %      MPV 8 9 fL      Platelets 646 Thousands/uL      nRBC 0 /100 WBCs      Neutrophils Relative 81 % Immat GRANS % 0 %      Lymphocytes Relative 12 %      Monocytes Relative 6 %      Eosinophils Relative 1 %      Basophils Relative 0 %      Neutrophils Absolute 9 69 Thousands/µL      Immature Grans Absolute 0 05 Thousand/uL      Lymphocytes Absolute 1 41 Thousands/µL      Monocytes Absolute 0 72 Thousand/µL      Eosinophils Absolute 0 07 Thousand/µL      Basophils Absolute 0 05 Thousands/µL                  XR chest 2 views   Final Result by Vanessa Schwartz MD (03/19 1752)      Findings consistent with emphysema and pulmonary fibrosis  No evidence of acute abnormality in the chest             Workstation performed: [id number]         CT head without contrast   Final Result by Steph Perez MD (03/19 1754)      No acute intracranial hemorrhage  Microangiopathic changes                    Workstation performed: [id number]                    Procedures  ECG 12 Lead Documentation Only  Date/Time: 3/19/2020 8:59 PM  Performed by: Aj Riggins PA-C  Authorized by: Aj Riggins PA-C     Indications / Diagnosis:  Sob  ECG reviewed by me, the ED Provider: yes    Patient location:  ED  Previous ECG:     Previous ECG:  Compared to current    Comparison ECG info:  Nov 2019    Similarity:  No change    Comparison to cardiac monitor: Yes    Interpretation:     Interpretation: non-specific    Quality:     Tracing quality:  Limited by artifact  Rate:     ECG rate:  76    ECG rate assessment: normal    Rhythm:     Rhythm: sinus rhythm    Ectopy:     Ectopy: none    QRS:     QRS axis:  Normal    QRS intervals:  Normal  Conduction:     Conduction: normal    ST segments:     ST segments:  Normal  T waves:     T waves: non-specific               ED Course         HEART Risk Score      Most Recent Value   Heart Score Risk Calculator   History  0 Filed at: 03/19/2020 2029   ECG  1 Filed at: 03/19/2020 2029   Age  2 Filed at: 03/19/2020 2029   Risk Factors  1 Filed at: 03/19/2020 2029   Troponin  0 Filed at: 03/19/2020 2029 HEART Score  4 Filed at: 03/19/2020 2029                MDM  Number of Diagnoses or Management Options  Dyspnea: new and requires workup  Diagnosis management comments: DDX including but not limited to: TIA, CVA, metabolic abnormality, cardiac etiology, atypical migraine, seizure, tumor, thyroid disease, infectious etiology  Plan: CT head  Cardiac workup  XR chest          Amount and/or Complexity of Data Reviewed  Clinical lab tests: ordered and reviewed  Tests in the radiology section of CPT®: ordered and reviewed  Independent visualization of images, tracings, or specimens: yes    Risk of Complications, Morbidity, and/or Mortality  Presenting problems: moderate  Management options: low  General comments: 81 yo with bilateral foot tingling and dyspnea  Her workup is overall unremarkable  Spoke with the patient's daughter  Reportedly the patient was "gasping for breath and minimally responsive"  Discussed dispo with daughter  We will admit for tele obs  Daughter would prefer this  Stable at the time of admission  Patient Progress  Patient progress: stable        Disposition  Final diagnoses:   Dyspnea     Time reflects when diagnosis was documented in both MDM as applicable and the Disposition within this note     Time User Action Codes Description Comment    3/19/2020  8:25 PM Beni David Add [R06 [id number]] Dyspnea       ED Disposition     ED Disposition Condition Date/Time Comment    Admit Stable u Mar 19, 2020  8:25 PM Case was discussed with Meek Urbano and the patient's admission status was agreed to be Admission Status: observation status to the service of Dr Wilder Denver   Follow-up Information    None         Patient's Medications   Discharge Prescriptions    No medications on file     No discharge procedures on file      PDMP Review     None          ED Provider  Electronically Signed by           David Hamilton PA-C  03/19/20 2028       David Hamilton PA-C  03/19/20 2029       David Hamilton HERBERT  03/19/20 [id number]

## 2020-03-20 VITALS
SYSTOLIC BLOOD PRESSURE: 123 MMHG | WEIGHT: 58.42 LBS | DIASTOLIC BLOOD PRESSURE: 47 MMHG | HEART RATE: 67 BPM | HEIGHT: 62 IN | OXYGEN SATURATION: 95 % | TEMPERATURE: 98.6 F | BODY MASS INDEX: 10.75 KG/M2 | RESPIRATION RATE: 18 BRPM

## 2020-03-20 LAB
ATRIAL RATE: 76 BPM
ATRIAL RATE: 79 BPM
P AXIS: 76 DEGREES
P AXIS: 80 DEGREES
PR INTERVAL: 140 MS
PR INTERVAL: 144 MS
QRS AXIS: -39 DEGREES
QRS AXIS: -40 DEGREES
QRSD INTERVAL: 72 MS
QRSD INTERVAL: 80 MS
QT INTERVAL: 378 MS
QT INTERVAL: 392 MS
QTC INTERVAL: 433 MS
QTC INTERVAL: 441 MS
T WAVE AXIS: 70 DEGREES
T WAVE AXIS: 78 DEGREES
VENTRICULAR RATE: 76 BPM
VENTRICULAR RATE: 79 BPM

## 2020-03-20 PROCEDURE — 93010 ELECTROCARDIOGRAM REPORT: CPT | Performed by: INTERNAL MEDICINE

## 2020-03-20 PROCEDURE — 97167 OT EVAL HIGH COMPLEX 60 MIN: CPT

## 2020-03-20 PROCEDURE — 97163 PT EVAL HIGH COMPLEX 45 MIN: CPT

## 2020-03-20 PROCEDURE — 99217 PR OBSERVATION CARE DISCHARGE MANAGEMENT: CPT | Performed by: NURSE PRACTITIONER

## 2020-03-20 RX ORDER — DOCUSATE SODIUM 100 MG/1
100 CAPSULE, LIQUID FILLED ORAL 2 TIMES DAILY PRN
Qty: 10 CAPSULE | Refills: 0 | Status: SHIPPED | OUTPATIENT
Start: 2020-03-20

## 2020-03-20 RX ORDER — ASPIRIN 81 MG/1
81 TABLET ORAL DAILY
Qty: 30 TABLET | Refills: 0 | Status: SHIPPED | OUTPATIENT
Start: 2020-03-21 | End: 2020-04-20

## 2020-03-20 RX ADMIN — DONEPEZIL HYDROCHLORIDE 10 MG: 5 TABLET ORAL at 09:40

## 2020-03-20 RX ADMIN — HEPARIN SODIUM 5000 UNITS: 5000 INJECTION INTRAVENOUS; SUBCUTANEOUS at 15:33

## 2020-03-20 RX ADMIN — HEPARIN SODIUM 5000 UNITS: 5000 INJECTION INTRAVENOUS; SUBCUTANEOUS at 00:21

## 2020-03-20 RX ADMIN — SERTRALINE HYDROCHLORIDE 25 MG: 25 TABLET ORAL at 09:40

## 2020-03-20 RX ADMIN — HEPARIN SODIUM 5000 UNITS: 5000 INJECTION INTRAVENOUS; SUBCUTANEOUS at 05:49

## 2020-03-20 RX ADMIN — NICOTINE 1 PATCH: 14 PATCH TRANSDERMAL at 09:40

## 2020-03-20 RX ADMIN — AMLODIPINE BESYLATE 5 MG: 5 TABLET ORAL at 09:40

## 2020-03-20 RX ADMIN — ASPIRIN 81 MG: 81 TABLET, COATED ORAL at 09:40

## 2020-03-20 NOTE — PLAN OF CARE
Problem: PAIN - ADULT  Goal: Verbalizes/displays adequate comfort level or baseline comfort level  Description  Interventions:  - Encourage patient to monitor pain and request assistance  - Assess pain using appropriate pain scale  - Administer analgesics based on type and severity of pain and evaluate response  - Implement non-pharmacological measures as appropriate and evaluate response  - Consider cultural and social influences on pain and pain management  - Notify physician/advanced practitioner if interventions unsuccessful or patient reports new pain  3/20/2020 1647 by Joan Munoz RN  Outcome: Adequate for Discharge  3/20/2020 0944 by Joan Munoz RN  Outcome: Progressing     Problem: INFECTION - ADULT  Goal: Absence or prevention of progression during hospitalization  Description  INTERVENTIONS:  - Assess and monitor for signs and symptoms of infection  - Monitor lab/diagnostic results  - Monitor all insertion sites, i e  indwelling lines, tubes, and drains  - Monitor endotracheal if appropriate and nasal secretions for changes in amount and color  - [place] appropriate cooling/warming therapies per order  - Administer medications as ordered  - Instruct and encourage patient and family to use good hand hygiene technique  - Identify and instruct in appropriate isolation precautions for identified infection/condition  3/20/2020 1647 by Joan Munoz RN  Outcome: Adequate for Discharge  3/20/2020 0944 by Joan Munoz RN  Outcome: Progressing  Goal: Absence of fever/infection during neutropenic period  Description  INTERVENTIONS:  - Monitor WBC    3/20/2020 1647 by Joan Munoz RN  Outcome: Adequate for Discharge  3/20/2020 0944 by Joan Munoz RN  Outcome: Progressing     Problem: SAFETY ADULT  Goal: Patient will remain free of falls  Description  INTERVENTIONS:  - Assess patient frequently for physical needs  -  Identify cognitive and physical deficits and behaviors that affect risk of falls    -  [place] fall precautions as indicated by assessment   - Educate patient/family on patient safety including physical limitations  - Instruct patient to call for assistance with activity based on assessment  - Modify environment to reduce risk of injury  - Consider OT/PT consult to assist with strengthening/mobility  3/20/2020 1647 by Jonathon Fitzpatrick RN  Outcome: Adequate for Discharge  3/20/2020 0944 by Jonathon Fitzpatrick RN  Outcome: Progressing  Goal: Maintain or return to baseline ADL function  Description  INTERVENTIONS:  -  Assess patient's ability to carry out ADLs; assess patient's baseline for ADL function and identify physical deficits which impact ability to perform ADLs (bathing, care of mouth/teeth, toileting, grooming, dressing, etc )  - Assess/evaluate cause of self-care deficits   - Assess range of motion  - Assess patient's mobility; develop plan if impaired  - Assess patient's need for assistive devices and provide as appropriate  - Encourage maximum independence but intervene and supervise when necessary  - Involve family in performance of ADLs  - Assess for home care needs following discharge   - Consider OT consult to assist with ADL evaluation and planning for discharge  - Provide patient education as appropriate  3/20/2020 1647 by Jonathon Fitzpatrick RN  Outcome: Adequate for Discharge  3/20/2020 0944 by Jonathon Fitzpatrick RN  Outcome: Progressing  Goal: Maintain or return mobility status to optimal level  Description  INTERVENTIONS:  - Assess patient's baseline mobility status (ambulation, transfers, stairs, etc )    - Identify cognitive and physical deficits and behaviors that affect mobility  - Identify mobility aids required to assist with transfers and/or ambulation (gait belt, sit-to-stand, lift, walker, cane, etc )  - [place] fall precautions as indicated by assessment  - Record patient progress and toleration of activity level on Mobility SBAR; progress patient to next Phase/Stage  - Instruct patient to call for assistance with activity based on assessment  - Consider rehabilitation consult to assist with strengthening/weightbearing, etc   3/20/2020 1647 by Jennifer Maciel RN  Outcome: Adequate for Discharge  3/20/2020 0944 by Jennifer Maciel RN  Outcome: Progressing     Problem: DISCHARGE PLANNING  Goal: Discharge to home or other facility with appropriate resources  Description  INTERVENTIONS:  - Identify barriers to discharge w/patient and caregiver  - Arrange for needed discharge resources and transportation as appropriate  - Identify discharge learning needs (meds, wound care, etc )  - Arrange for interpretive services to assist at discharge as needed  - Refer to Case Management Department for coordinating discharge planning if the patient needs post-hospital services based on physician/advanced practitioner order or complex needs related to functional status, cognitive ability, or social support system  3/20/2020 1647 by Jennifer Maciel RN  Outcome: Adequate for Discharge  3/20/2020 0944 by Jennifer Maciel RN  Outcome: Progressing     Problem: Knowledge Deficit  Goal: Patient/family/caregiver demonstrates understanding of disease process, treatment plan, medications, and discharge instructions  Description  Complete learning assessment and assess knowledge base    Interventions:  - Provide teaching at level of understanding  - Provide teaching via preferred learning methods  3/20/2020 1647 by Jennifer Maciel RN  Outcome: Adequate for Discharge  3/20/2020 0944 by Jennifer Maciel RN  Outcome: Progressing     Problem: Prexisting or High Potential for Compromised Skin Integrity  Goal: Skin integrity is maintained or improved  Description  INTERVENTIONS:  - Identify patients at risk for skin breakdown  - Assess and monitor skin integrity  - Assess and monitor nutrition and hydration status  - Monitor labs   - Assess for incontinence   - Turn and reposition patient  - Assist with mobility/ambulation  - Relieve pressure over bony prominences  - Avoid friction and shearing  - Provide appropriate hygiene as needed including keeping skin clean and dry  - Evaluate need for skin moisturizer/barrier cream  - Collaborate with interdisciplinary team   - Patient/family teaching  - Consider wound care consult   3/20/2020 1647 by Mann Dalton RN  Outcome: Adequate for Discharge  3/20/2020 0944 by Mann Dalton RN  Outcome: Progressing     Problem: Nutrition/Hydration-ADULT  Goal: Nutrient/Hydration intake appropriate for improving, restoring or maintaining nutritional needs  Description  Monitor and assess patient's nutrition/hydration status for malnutrition  Collaborate with interdisciplinary team and initiate plan and interventions as ordered  Monitor patient's weight and dietary intake as ordered or per policy  Utilize nutrition screening tool and intervene as necessary  Determine patient's food preferences and provide high-protein, high-caloric foods as appropriate       INTERVENTIONS:  - Monitor oral intake, urinary output, labs, and treatment plans  - Assess nutrition and hydration status and recommend course of action  - Evaluate amount of meals eaten  - Assist patient with eating if necessary   - Allow adequate time for meals  - Recommend/ encourage appropriate diets, oral nutritional supplements, and vitamin/mineral supplements  - Order, calculate, and assess calorie counts as needed  - Recommend, monitor, and adjust tube feedings and TPN/PPN based on assessed needs  - Assess need for intravenous fluids  - Provide specific nutrition/hydration education as appropriate  - Include patient/family/caregiver in decisions related to nutrition  3/20/2020 1647 by Mann Dalton RN  Outcome: Adequate for Discharge  3/20/2020 0944 by Mann Dalton RN  Outcome: Progressing     Problem: Potential for Falls  Goal: Patient will remain free of falls  Description  INTERVENTIONS:  - Assess patient frequently for physical needs  -  Identify cognitive and physical deficits and behaviors that affect risk of falls    -  [place] fall precautions as indicated by assessment   - Educate patient/family on patient safety including physical limitations  - Instruct patient to call for assistance with activity based on assessment  - Modify environment to reduce risk of injury  - Consider OT/PT consult to assist with strengthening/mobility  3/20/2020 1647 by Felicitas Young RN  Outcome: Adequate for Discharge  3/20/2020 0944 by Felicitas Young RN  Outcome: Progressing

## 2020-03-20 NOTE — ED NOTES
1  , WBC Cc: dyspneaAdmission related to injury?: no  2  Orientation status: a/o x4  3  Abnormal labs/abnormal focused assessment/vitals: ALT 9, WBC 11 99, see x-ray report  4  Medication/drips: no running drips  5  Time of last narcotics given: none  6  IV lines, drains, tubes: 20 L AC, 22 L forearm  7  Isolation status: standard  8  Skin check: pt voiced no complaints  9  Ambulation status: assist x2  10   ED RN name and phone number: Jane Morales [facility] GOOM, RN  03/19/20 2034

## 2020-03-20 NOTE — ASSESSMENT & PLAN NOTE
· Patient had sudden onset and resolution of shortness of breath  Patient reports that she is short of breath every morning when she wakes up she has significant coughing and then improves denies any dyspnea with exertion  · This appears to be a chronic issue per records  · Reports chronic productive cough  · Patient smokes 1 pack of cigarettes daily  · Chest x-ray (3/19/2020): Findings consistent with emphysema and pulmonary fibrosis  · No signs of infection noted on exam, antibiotics not indicated  Lung sounds are clear  · Oxygen saturation levels 93-97% on room air since admission  · Telemetry overnight with no events  · Patient states she has had multiple falls at home, PT/OT recommending STR    · Shortness of breath has resolved

## 2020-03-20 NOTE — PLAN OF CARE
Problem: OCCUPATIONAL THERAPY ADULT  Goal: Performs self-care activities at highest level of function for planned discharge setting  See evaluation for individualized goals  Description  Treatment Interventions: ADL retraining, Functional transfer training, UE strengthening/ROM, Endurance training, Patient/family training, Fine motor coordination activities, Compensatory technique education, Continued evaluation, Energy conservation, Activityengagement          See flowsheet documentation for full assessment, interventions and recommendations  Note:   Limitation: Decreased ADL status, Decreased Safe judgement during ADL, Decreased cognition, Decreased endurance, Decreased fine motor control, Decreased self-care trans, Decreased high-level ADLs  Prognosis: Good  Assessment: Patient is a 80 y o  female seen for OT evaluation s/p admit to [id number] [facility] on 3/19/2020 w/Dyspnea  Commorbidities affecting patient's functional performance at time of assessment include: Numbness and tingling of left foot, current smoker, Dementia, HTN, presented to Ed with c/o of c/o bilateral feet numbness that has been ongoing for several months  Patient stated in the ED: "she is having a hard time getting around today"  Orders placed for OT evaluation and treatment   Performed at least two patient identifiers during session including name and wristband  Prior to admission, Patient reported independent with ADLs, nees assistance with IADLs  patient ambulates with RW, ' not all the time the bathroom is just around the corner" Patient lives with her daughter in a 2 story house,  [address]  Daughter usually works, patient stays alone with her pets (per patient report)    Personal factors affecting patient at time of initial evaluation include: limited caregiver support, steps to enter, limited insight into deficits, non compliance, flat affect, decreased initiation and engagement, difficulty performing ADLs and difficulty performing IADLs  Upon evaluation, patient requires minimal  assist for UB ADLs, moderate assist for LB ADLs, transfers and functional ambulation in room and bathroom with minimal  assist, with the use of Rolling Walker  Occupational performance is affected by the following deficits: orientation, degenerative arthritic joint changes, impaired gross motor coordination, dynamic sit/ stand balance deficit with poor standing tolerance time for self care and functional mobility, decreased activity tolerance, impaired memory, impaired problem solving, decreased safety awareness, decreased coping skills and postural control and postural alignment deficit, requiring external assistance to complete transitional movements  Therapist completed  extensive additional review of medical records and additional review of physical, cognitive or psychosocial history, clinical examination identifying 5 or more performance deficits, clinical decision making of a high complexity , consistent with a high complexity level evaluation  Patient to benefit from continued Occupational Therapy treatment while in the hospital to address deficits as defined above and maximize level of functional independence with ADLs and functional mobility        OT Discharge Recommendation: Short Term Rehab

## 2020-03-20 NOTE — PLAN OF CARE
Problem: PAIN - ADULT  Goal: Verbalizes/displays adequate comfort level or baseline comfort level  Description  Interventions:  - Encourage patient to monitor pain and request assistance  - Assess pain using appropriate pain scale  - Administer analgesics based on type and severity of pain and evaluate response  - Implement non-pharmacological measures as appropriate and evaluate response  - Consider cultural and social influences on pain and pain management  - Notify physician/advanced practitioner if interventions unsuccessful or patient reports new pain  Outcome: Not Progressing     Problem: INFECTION - ADULT  Goal: Absence or prevention of progression during hospitalization  Description  INTERVENTIONS:  - Assess and monitor for signs and symptoms of infection  - Monitor lab/diagnostic results  - Monitor all insertion sites, i e  indwelling lines, tubes, and drains  - Monitor endotracheal if appropriate and nasal secretions for changes in amount and color  - [place] appropriate cooling/warming therapies per order  - Administer medications as ordered  - Instruct and encourage patient and family to use good hand hygiene technique  - Identify and instruct in appropriate isolation precautions for identified infection/condition  Outcome: Not Progressing  Goal: Absence of fever/infection during neutropenic period  Description  INTERVENTIONS:  - Monitor WBC    Outcome: Not Progressing     Problem: SAFETY ADULT  Goal: Patient will remain free of falls  Description  INTERVENTIONS:  - Assess patient frequently for physical needs  -  Identify cognitive and physical deficits and behaviors that affect risk of falls    -  [place] fall precautions as indicated by assessment   - Educate patient/family on patient safety including physical limitations  - Instruct patient to call for assistance with activity based on assessment  - Modify environment to reduce risk of injury  - Consider OT/PT consult to assist with strengthening/mobility  Outcome: Not Progressing  Goal: Maintain or return to baseline ADL function  Description  INTERVENTIONS:  -  Assess patient's ability to carry out ADLs; assess patient's baseline for ADL function and identify physical deficits which impact ability to perform ADLs (bathing, care of mouth/teeth, toileting, grooming, dressing, etc )  - Assess/evaluate cause of self-care deficits   - Assess range of motion  - Assess patient's mobility; develop plan if impaired  - Assess patient's need for assistive devices and provide as appropriate  - Encourage maximum independence but intervene and supervise when necessary  - Involve family in performance of ADLs  - Assess for home care needs following discharge   - Consider OT consult to assist with ADL evaluation and planning for discharge  - Provide patient education as appropriate  Outcome: Not Progressing  Goal: Maintain or return mobility status to optimal level  Description  INTERVENTIONS:  - Assess patient's baseline mobility status (ambulation, transfers, stairs, etc )    - Identify cognitive and physical deficits and behaviors that affect mobility  - Identify mobility aids required to assist with transfers and/or ambulation (gait belt, sit-to-stand, lift, walker, cane, etc )  - [place] fall precautions as indicated by assessment  - Record patient progress and toleration of activity level on Mobility SBAR; progress patient to next Phase/Stage  - Instruct patient to call for assistance with activity based on assessment  - Consider rehabilitation consult to assist with strengthening/weightbearing, etc   Outcome: Not Progressing     Problem: DISCHARGE PLANNING  Goal: Discharge to home or other facility with appropriate resources  Description  INTERVENTIONS:  - Identify barriers to discharge w/patient and caregiver  - Arrange for needed discharge resources and transportation as appropriate  - Identify discharge learning needs (meds, wound care, etc )  - Arrange for interpretive services to assist at discharge as needed  - Refer to Case Management Department for coordinating discharge planning if the patient needs post-hospital services based on physician/advanced practitioner order or complex needs related to functional status, cognitive ability, or social support system  Outcome: Not Progressing     Problem: Knowledge Deficit  Goal: Patient/family/caregiver demonstrates understanding of disease process, treatment plan, medications, and discharge instructions  Description  Complete learning assessment and assess knowledge base    Interventions:  - Provide teaching at level of understanding  - Provide teaching via preferred learning methods  Outcome: Not Progressing

## 2020-03-20 NOTE — DISCHARGE INSTRUCTIONS
Cigarette Smoking and Your Health   AMBULATORY CARE:   Risks to your health if you smoke:  Nicotine and other chemicals found in tobacco damage every cell in your body  Even if you are a light smoker, you have an increased risk for cancer, heart disease, and lung disease  If you are pregnant or have diabetes, smoking increases your risk for complications  Benefits to your health if you stop smoking:   · You decrease respiratory symptoms such as coughing, wheezing, and shortness of breath  · You reduce your risk for cancers of the lung, mouth, throat, kidney, bladder, pancreas, stomach, and cervix  If you already have cancer, you increase the benefits of chemotherapy  You also reduce your risk for cancer returning or a second cancer from developing  · You reduce your risk for heart disease, blood clots, heart attack, and stroke  · You reduce your risk for lung infections, and diseases such as pneumonia, asthma, chronic bronchitis, and emphysema  · Your circulation improves  More oxygen can be delivered to your body  If you have diabetes, you lower your risk for complications, such as kidney, artery, and eye diseases  You also lower your risk for nerve damage  Nerve damage can lead to amputations, poor vision, and blindness  · You improve your body's ability to heal and to fight infections  Benefits to the health of others if you stop smoking:  Tobacco is harmful to nonsmokers who breathe in your secondhand smoke  The following are ways the health of others around you may improve when you stop smoking:  · You lower the risks for lung cancer and heart disease in nonsmoking adults  · If you are pregnant, you lower the risk for miscarriage, early delivery, low birth weight, and stillbirth  You also lower your baby's risk for SIDS, obesity, developmental delay, and neurobehavioral problems, such as ADHD       · If you have children, you lower their risk for ear infections, colds, pneumonia, bronchitis, and asthma  For more information and support to stop smoking:   · Smokefree  gov  Phone: 7- 192 - 019-8489  Web Address: www smokefrEarDish  Follow up with your healthcare provider as directed:  Write down your questions so you remember to ask them during your visits  © 2017 [address] [employer] Information is for End User's use only and may not be sold, redistributed or otherwise used for commercial purposes  All illustrations and images included in CareNotes® are the copyrighted property of [employer]  or Daljit Arvizu  The above information is an  only  It is not intended as medical advice for individual conditions or treatments  Talk to your doctor, nurse or pharmacist before following any medical regimen to see if it is safe and effective for you  Aspirin (By mouth)   Aspirin (AS-pir-in)  Treats pain, fever, and inflammation  May lower risk of heart attack and stroke  Brand Name(s): Ascriptin Regular Strength, Aspergum, Aspir Low, Aspirin Adult Low Dose, Aspirin Low Dose, Justine Aspirin Children's, Justine Aspirin Regimen, Justine Extra Strength, Justine Genuine Aspirin, Bufferin, Bufferin Low Dose, Durlaza, Ecotrin, Ecpirin, Enteric Aspirin   There may be other brand names for this medicine  When This Medicine Should Not Be Used: This medicine is not right for everyone  Do not use it if you had an allergic reaction to aspirin or other NSAIDs, or if you have a history of asthma with nasal polyps and rhinitis  How to Use This Medicine:   Delayed Release Capsule, Long Acting Capsule, Gum, Tablet, Chewable Tablet, Fizzy Tablet, Coated Tablet, Long Acting Tablet, 24 Hour Capsule  · Your doctor will tell you how much medicine to use  Do not use more than directed  · It is best to take this medicine with food or milk  · Capsule, tablet, or coated tablet: Swallow whole  Do not crush, break, or chew it    · Chewable tablet: You may chew it completely or swallow it whole  · Gum: Chew completely to make sure you get as much medicine as possible  Drink a full glass (8 ounces) of water after chewing the gum  · Swallow the extended-release capsule whole  Do not crush, break, or chew it  Take the capsule with a full glass of water at the same time each day  · Follow the instructions on the medicine label if you are using this medicine without a prescription  · Missed dose: If you miss a dose of Durlaza, skip the missed dose and go back to your regular dosing schedule  Do not take extra medicine to make up for a missed dose  · Store the medicine in a closed container at room temperature, away from heat, moisture, and direct light  Drugs and Foods to Avoid:   Ask your doctor or pharmacist before using any other medicine, including over-the-counter medicines, vitamins, and herbal products  · Some foods and medicines can affect how aspirin works  Tell your doctor if you are using any of the following:  ¨ Dipyridamole, methotrexate, probenecid, sulfinpyrazone, ticlopidine  ¨ Blood thinner (including clopidogrel, prasugrel, ticagrelor, warfarin)  ¨ Blood pressure medicine  ¨ Medicine to treat seizures (including phenytoin, valproic acid)  ¨ NSAID pain or arthritis medicine (including celecoxib, diclofenac, ibuprofen, naproxen)  ¨ Steroid medicine (including dexamethasone, hydrocortisone, methylprednisolone, prednisolone, prednisone)  · Do not take Durlaza 2 hours before or 1 hour after you drink alcohol or take medicines that contain alcohol  Warnings While Using This Medicine:   · Tell your doctor if you are pregnant or breastfeeding  Do not use this medicine during the later part of a pregnancy unless your doctor tells you to  · Tell your doctor if you have kidney disease, liver disease, high blood pressure, heart disease, or a history of stomach bleeding or ulcers  · This medicine may increase your risk for bleeding, including stomach ulcers    · Do not give aspirin to a child or teenager who has chickenpox or flu symptoms, unless the doctor says it is okay  Aspirin can cause a life-threatening reaction called Reye syndrome  · Tell any doctor or dentist who treats you that you are using this medicine  This medicine may affect certain medical test results  · Keep all medicine out of the reach of children  Never share your medicine with anyone  Possible Side Effects While Using This Medicine:   Call your doctor right away if you notice any of these side effects:  · Allergic reaction: Itching or hives, swelling in your face or hands, swelling or tingling in your mouth or throat, chest tightness, trouble breathing  · Bloody or black stools, bloody vomit or vomit that looks like coffee grounds  · Chest tightness, wheezing  · Ringing in the ears  · Severe stomach pain  · Unusual bleeding, bruising, or weakness  If you notice other side effects that you think are caused by this medicine, tell your doctor  Call your doctor for medical advice about side effects  You may report side effects to FDA at 6-985-FDA-0736  © 2017 [address] [employer]  Information is for End User's use only and may not be sold, redistributed or otherwise used for commercial purposes  The above information is an  only  It is not intended as medical advice for individual conditions or treatments  Talk to your doctor, nurse or pharmacist before following any medical regimen to see if it is safe and effective for you  Laxative, Stool Softeners (By mouth)   Treats constipation by helping you have a bowel movement  Brand Name(s): Col-Rite, Colace, Colace Clear, DSS, Diocto, Diocto Liquid, Doc-Q-Lace, Docuprene, Docusil, Dok, Dulcolax, Fleet Sof-Lax, [facility] Docusate Calcium, [facility] Stool Softener, [facility] Stool Softner   There may be other brand names for this medicine  When This Medicine Should Not Be Used:    You should not use this medicine if you have severe stomach pain, nausea, or vomiting  Stool softeners should not be used if you have severe stomach pain and do not know the cause  How to Use This Medicine:   Capsule, Tablet, Liquid, Liquid Filled Capsule  · Your doctor will tell you how much medicine to use  Do not use more than directed  · Follow the instructions on the medicine label if you are using this medicine without a prescription  · Drink 6 to 8 glasses of water daily while using any laxative  · To make the oral liquid taste better, you may mix it with one-half glass of milk or fruit juice  · Measure the oral liquid medicine with a marked measuring spoon, oral syringe, medicine cup, or medicine dropper  If a dose is missed:   · Use the missed dose as soon as possible  · If you do not remember the missed dose until the next day, skip the missed dose and go back to your regular dosing schedule  · You should not use two doses at the same time  How to Store and Dispose of This Medicine:   · Store the medicine in a tightly closed container at room temperature, away from heat and moisture  Do not store liquid-filled capsules in the refrigerator  · Keep all medicine out of the reach of children  Drugs and Foods to Avoid:   Ask your doctor or pharmacist before using any other medicine, including over-the-counter medicines, vitamins, and herbal products  · You should not use mineral oil while you are using a stool softener  · You should not use a stool softener within 2 hours before or after taking any other medicines  Laxatives can keep other medicines from working correctly  Warnings While Using This Medicine:   · If you are pregnant or breastfeeding, talk to your doctor before taking this medicine  · Do not give laxatives to children under 10years old unless you talk to your doctor  · You should not use this laxative for longer than 1 week unless approved by your doctor   Laxatives may be habit-forming and can harm your bowels if you use them too long  · Stool softeners usually work in 1 to 2 days, but for some people, results can take as long as 3 to 5 days  Possible Side Effects While Using This Medicine: If you notice these less serious side effects, talk with your doctor:  · Nausea  · Sore throat  · Skin rash  If you notice other side effects that you think are caused by this medicine, tell your doctor  Call your doctor for medical advice about side effects  You may report side effects to FDA at 3-880-FDA-6652  © 2017 [address] [employer] Information is for End User's use only and may not be sold, redistributed or otherwise used for commercial purposes  The above information is an  only  It is not intended as medical advice for individual conditions or treatments  Talk to your doctor, nurse or pharmacist before following any medical regimen to see if it is safe and effective for you

## 2020-03-20 NOTE — PLAN OF CARE
Problem: PHYSICAL THERAPY ADULT  Goal: Performs mobility at highest level of function for planned discharge setting  See evaluation for individualized goals  Description  Treatment/Interventions: Functional transfer training, LE strengthening/ROM, Elevations, Therapeutic exercise, Endurance training, Patient/family training, Equipment eval/education, Bed mobility, Gait training, Spoke to nursing, OT          See flowsheet documentation for full assessment, interventions and recommendations  Note:   Prognosis: Good  Problem List: Decreased strength, Decreased endurance, Impaired balance, Decreased mobility, Decreased cognition, Decreased safety awareness, Impaired vision, Impaired hearing  Assessment: Pt is 80 y o  female seen for PT evaluation s/p admit to [facility] on 3/19/2020 w/ Dyspnea  PT consulted to assess pt's functional mobility and d/c needs  Order placed for PT eval and tx, w/ up w/ A order  Performed at least 2 patient identifiers during session: Name and   Comorbidities affecting pt's physical performance at time of assessment include: numbness and tingling of foot, dementia, tobacco dependence, urinary incontinence, hearing loss, malnutrition  PTA, pt was independent w/ all functional mobility w/ RW vs  SPC vs  no AD, ambulates household distances, has 1 JORGE, lives w/ daughter in two level house with first floor set-up and retired  Personal factors affecting pt at time of IE include: ambulating w/ assistive device, stairs to enter home, inability to navigate level surfaces w/o external assistance, decreased cognition, limited home support, positive fall history, hearing impairments, visual impairments, limited insight into impairments, inability to perform IADLs, inability to perform ADLs and inability to live alone   Please find objective findings from PT assessment regarding body systems outlined above with impairments and limitations including weakness, impaired balance, decreased endurance, gait deviations, decreased activity tolerance, decreased functional mobility tolerance, decreased safety awareness, fall risk and decreased cognition  The following objective measures performed on IE also reveal limitations: Barthel Index: 35/100 and Modified Hooven: 4 (moderate/severe disability)  Pt's clinical presentation is currently unstable/unpredictable seen in pt's presentation of ongoing medical management/monitoring, fatigue impacting overall mobility status and need for input for mobility technique/safety  Pt to benefit from continued PT tx to address deficits as defined above and maximize level of functional independent mobility and consistency  From PT/mobility standpoint, recommendation at time of d/c would be STR pending progress in order to facilitate return to PLOF  Barriers to Discharge: Decreased caregiver support     Recommendation: Short-term skilled PT     PT - OK to Discharge: Yes(when medically cleared; if to STR)    See flowsheet documentation for full assessment

## 2020-03-20 NOTE — ASSESSMENT & PLAN NOTE
· Patient had sudden onset and resolution of shortness of breath  Patient reports that she is short of breath every morning when she wakes up she has significant coughing and then improves denies any dyspnea with exertion  · This appears to be a chronic issue per records  · Reports chronic productive cough  · Patient smokes 1 pack of cigarettes daily, will order nicotine patch in patient  · Chest x-ray (3/19/2020): Findings consistent with emphysema and pulmonary fibrosis  · No signs of infection noted on exam, antibiotics not indicated  Lung sounds are clear  · Oxygen saturation levels 93-97% on room air since admission  · Telemetry overnight with no events  · Will get PT/OT eval for safe discharge planning  Patient states she has had multiple falls at home

## 2020-03-20 NOTE — ASSESSMENT & PLAN NOTE
Patient had sudden onset and resolution of shortness of breath  Patient reports that she is short of breath every morning when she wakes up she has significant coughing and then improves denies any dyspnea with exertion  Reports chronic productive cough  Patient smokes 1 pack of cigarettes daily, will order nicotine patch in patient  Chest x-ray completed showing Findings consistent with emphysema and pulmonary fibrosis    No signs of infection noted on exam, antibiotics not indicated  Patient appears to be oxygenating in the high 90s on room air  Overnight telemetry monitoring for possible arrhythmias

## 2020-03-20 NOTE — UTILIZATION REVIEW
Initial Clinical Review    Admission: Date/Time/Statement: Admission Orders (From admission, onward)     Ordered        03/19/20 2026  Place in Observation  Once                   Orders Placed This Encounter   Procedures    Place in Observation     Standing Status:   Standing     Number of Occurrences:   1     Order Specific Question:   Admitting Physician     Answer:   Isabel Presume     Order Specific Question:   Level of Care     Answer:   Med Surg [[id number]]     ED Arrival Information     Expected Arrival Acuity Means of Arrival Escorted By Service Admission Type    - 3/19/2020 16:39 Urgent [facility]    Arrival Complaint    foot numbness        Chief Complaint   Patient presents with    Numbness     Patient c/o bilateral feet numbness that has been ongoing for several months  Patient states"she is having a hard time getting around today"  Assessment/Plan: 80year old female to the ED from home with complaints of bilateral foot numbness  Admitted under observation for dyspnea, numbness and tingling of foot  Sensation is back to normal now  SHe had sudden onset and resolution of shortness of breath this afternoon  PT/OT for safe dc planning  LUngs are clear  She has chronic cough  Dyspnea  Assessment & Plan  Patient had sudden onset and resolution of shortness of breath  Patient reports that she is short of breath every morning when she wakes up she has significant coughing and then improves denies any dyspnea with exertion  Reports chronic productive cough  Patient smokes 1 pack of cigarettes daily, will order nicotine patch in patient  Chest x-ray completed showing Findings consistent with emphysema and pulmonary fibrosis    No signs of infection noted on exam, antibiotics not indicated  Patient appears to be oxygenating in the high 90s on room air  Overnight telemetry monitoring for possible arrhythmias      Numbness and tingling of foot  Assessment & Plan  Chronic, apparently only occurs when sitting in a certain chair  Follow-up with PCP outpatient        ED Triage Vitals   Temperature Pulse Respirations Blood Pressure SpO2   03/19/20 1650 03/19/20 1645 03/19/20 1645 03/19/20 1645 03/19/20 1645   98 6 °F (37 °C) 86 18 163/71 94 %      Temp Source Heart Rate Source Patient Position - Orthostatic VS BP Location FiO2 (%)   03/19/20 1650 03/19/20 1650 03/19/20 1650 03/19/20 1650 --   Oral Monitor Lying Right arm       Pain Score       03/19/20 2200       No Pain        Wt Readings from Last 1 Encounters:   03/19/20 26 5 kg (58 lb 6 8 oz)     Additional Vital Signs:   Date/Time  Temp  Pulse  Resp  BP  MAP (mmHg)  SpO2  O2 Device  Patient Position - Orthostatic VS   03/20/20 07:41:30  97 4 °F (36 3 °C)Abnormal   58  16  128/41Abnormal   70  93 %       03/20/20 02:56:31  97 7 °F (36 5 °C)  67    126/45Abnormal   72  97 %       03/20/20 02:56:11  97 7 °F (36 5 °C)  71    126/45Abnormal   72  97 %       03/19/20 23:14:25  98 °F (36 7 °C)  77  16  129/44Abnormal   72  95 %       03/19/20 22:00:47  98 4 °F (36 9 °C)  75  18  131/42Abnormal   72  96 %       03/19/20 2004    91  18  130/56    97 %  None (Room air)  Lying   03/19/20 1830    84  18      98 %       03/19/20 1815    77  22      97 %       03/19/20 1800    79  19  170/72  104  97 %       03/19/20 1730    75  21  162/70  101  96 %       03/19/20 1721        162/70        Lying   03/19/20 1650  98 6 °F (37 °C)  85  20  163/71             Pertinent Labs/Diagnostic Test Results:   3/19 EKG:   Interpretation:     Interpretation: non-specific    Quality:     Tracing quality:  Limited by artifact  Rate:     ECG rate:  76    ECG rate assessment: normal    Rhythm:     Rhythm: sinus rhythm    Ectopy:     Ectopy: none    QRS:     QRS axis:  Normal    QRS intervals:  Normal  Conduction:     Conduction: normal    ST segments:     ST segments:  Normal  T waves:     T waves: non-specific    3/19 CXR:  Findings consistent with emphysema and pulmonary fibrosis  No evidence of acute abnormality in the chest   3/19 CT head: No acute intracranial hemorrhage   Microangiopathic changes    Results from last 7 days   Lab Units 03/19/20  1713   WBC Thousand/uL 11 99*   HEMOGLOBIN g/dL 12 4   HEMATOCRIT % 42 6   PLATELETS Thousands/uL 417*   NEUTROS ABS Thousands/µL 9 69*         Results from last 7 days   Lab Units 03/19/20  1713   SODIUM mmol/L 140   POTASSIUM mmol/L 4 2   CHLORIDE mmol/L 105   CO2 mmol/L 28   ANION GAP mmol/L 7   BUN mg/dL 16   CREATININE mg/dL 0 84   EGFR ml/min/1 73sq m 61   CALCIUM mg/dL 8 4     Results from last 7 days   Lab Units 03/19/20  1713   AST U/L 12   ALT U/L 9*   ALK PHOS U/L 64   TOTAL PROTEIN g/dL 6 9   ALBUMIN g/dL 2 6*   TOTAL BILIRUBIN mg/dL 0 40         Results from last 7 days   Lab Units 03/19/20  1713   GLUCOSE RANDOM mg/dL 85       Results from last 7 days   Lab Units 03/19/20  1713   TROPONIN I ng/mL <0 02             Results from last 7 days   Lab Units 03/19/20  1713   TSH 3RD GENERATON uIU/mL 1 244         Results from last 7 days   Lab Units 03/19/20  1713   NT-PRO BNP pg/mL 211         Results from last 7 days   Lab Units 03/19/20  1837   CLARITY UA  Clear   COLOR UA  Yellow   SPEC GRAV UA  1 015   PH UA  7 5   GLUCOSE UA mg/dl Negative   KETONES UA mg/dl Negative   BLOOD UA  Negative   PROTEIN UA mg/dl Negative   NITRITE UA  Negative   BILIRUBIN UA  Negative   UROBILINOGEN UA E U /dl 0 2   LEUKOCYTES UA  Small*   WBC UA /hpf 4-10*   RBC UA /hpf None Seen   BACTERIA UA /hpf Occasional   EPITHELIAL CELLS WET PREP /hpf Occasional          Past Medical History:   Diagnosis Date    Current smoker     Dementia ([facility] [address] )     History of tuberculosis 40 years ago treated     Hypertension     Ovarian cyst      Admitting Diagnosis: Dyspnea [R06 00]  Numbness and tingling of foot [R20 0, R20 2]  Age/Sex: 80 y o  female  Admission Orders:  Scheduled Medications:    Medications:  amLODIPine 5 mg Oral Daily   aspirin 81 mg Oral Daily   donepezil 10 mg Oral Daily   heparin (porcine) 5,000 Units Subcutaneous Q8H [address]   nicotine 1 patch Transdermal Daily   sertraline 25 mg Oral Daily     Continuous IV Infusions:     PRN Meds:    acetaminophen 650 mg Oral Q6H PRN   calcium carbonate 1,000 mg Oral Daily PRN   docusate sodium 100 mg Oral BID PRN   ipratropium-albuterol 3 mL Nebulization Q6H PRN   ondansetron 4 mg Intravenous Q6H PRN     Network Utilization Review Department  [email or web address]  ATTENTION: Please call with any questions or concerns to [phone] and carefully listen to the prompts so that you are directed to the right person  All voicemails are confidential   Kori Bri all requests for admission clinical reviews, approved or denied determinations and any other requests to dedicated fax number below belonging to the campus where the patient is receiving treatment   List of dedicated fax numbers for the Facilities:  [address] [place] [address] DENIALS (Administrative/Medical Necessity) [phone]   [address] (Maternity/NICU/Pediatrics) [phone]   Chu Willis [phone]   [facility] [phone]   Faustino Conteh [phone]   [facility] [phone]   [address] [facility] [address] [facility] [phone]   [facility]  [phone]   [address] [facility] [address] [facility]  [address] [facility] [address] [facility] [phone]

## 2020-03-20 NOTE — PLAN OF CARE
Problem: PAIN - ADULT  Goal: Verbalizes/displays adequate comfort level or baseline comfort level  Description  Interventions:  - Encourage patient to monitor pain and request assistance  - Assess pain using appropriate pain scale  - Administer analgesics based on type and severity of pain and evaluate response  - Implement non-pharmacological measures as appropriate and evaluate response  - Consider cultural and social influences on pain and pain management  - Notify physician/advanced practitioner if interventions unsuccessful or patient reports new pain  Outcome: Progressing     Problem: INFECTION - ADULT  Goal: Absence or prevention of progression during hospitalization  Description  INTERVENTIONS:  - Assess and monitor for signs and symptoms of infection  - Monitor lab/diagnostic results  - Monitor all insertion sites, i e  indwelling lines, tubes, and drains  - Monitor endotracheal if appropriate and nasal secretions for changes in amount and color  - Norris appropriate cooling/warming therapies per order  - Administer medications as ordered  - Instruct and encourage patient and family to use good hand hygiene technique  - Identify and instruct in appropriate isolation precautions for identified infection/condition  Outcome: Progressing  Goal: Absence of fever/infection during neutropenic period  Description  INTERVENTIONS:  - Monitor WBC    Outcome: Progressing     Problem: SAFETY ADULT  Goal: Patient will remain free of falls  Description  INTERVENTIONS:  - Assess patient frequently for physical needs  -  Identify cognitive and physical deficits and behaviors that affect risk of falls    -  Norris fall precautions as indicated by assessment   - Educate patient/family on patient safety including physical limitations  - Instruct patient to call for assistance with activity based on assessment  - Modify environment to reduce risk of injury  - Consider OT/PT consult to assist with strengthening/mobility  Outcome: Progressing  Goal: Maintain or return to baseline ADL function  Description  INTERVENTIONS:  -  Assess patient's ability to carry out ADLs; assess patient's baseline for ADL function and identify physical deficits which impact ability to perform ADLs (bathing, care of mouth/teeth, toileting, grooming, dressing, etc )  - Assess/evaluate cause of self-care deficits   - Assess range of motion  - Assess patient's mobility; develop plan if impaired  - Assess patient's need for assistive devices and provide as appropriate  - Encourage maximum independence but intervene and supervise when necessary  - Involve family in performance of ADLs  - Assess for home care needs following discharge   - Consider OT consult to assist with ADL evaluation and planning for discharge  - Provide patient education as appropriate  Outcome: Progressing  Goal: Maintain or return mobility status to optimal level  Description  INTERVENTIONS:  - Assess patient's baseline mobility status (ambulation, transfers, stairs, etc )    - Identify cognitive and physical deficits and behaviors that affect mobility  - Identify mobility aids required to assist with transfers and/or ambulation (gait belt, sit-to-stand, lift, walker, cane, etc )  - [place] fall precautions as indicated by assessment  - Record patient progress and toleration of activity level on Mobility SBAR; progress patient to next Phase/Stage  - Instruct patient to call for assistance with activity based on assessment  - Consider rehabilitation consult to assist with strengthening/weightbearing, etc   Outcome: Progressing     Problem: DISCHARGE PLANNING  Goal: Discharge to home or other facility with appropriate resources  Description  INTERVENTIONS:  - Identify barriers to discharge w/patient and caregiver  - Arrange for needed discharge resources and transportation as appropriate  - Identify discharge learning needs (meds, wound care, etc )  - Arrange for interpretive services to assist at discharge as needed  - Refer to Case Management Department for coordinating discharge planning if the patient needs post-hospital services based on physician/advanced practitioner order or complex needs related to functional status, cognitive ability, or social support system  Outcome: Progressing     Problem: Knowledge Deficit  Goal: Patient/family/caregiver demonstrates understanding of disease process, treatment plan, medications, and discharge instructions  Description  Complete learning assessment and assess knowledge base  Interventions:  - Provide teaching at level of understanding  - Provide teaching via preferred learning methods  Outcome: Progressing     Problem: Prexisting or High Potential for Compromised Skin Integrity  Goal: Skin integrity is maintained or improved  Description  INTERVENTIONS:  - Identify patients at risk for skin breakdown  - Assess and monitor skin integrity  - Assess and monitor nutrition and hydration status  - Monitor labs   - Assess for incontinence   - Turn and reposition patient  - Assist with mobility/ambulation  - Relieve pressure over bony prominences  - Avoid friction and shearing  - Provide appropriate hygiene as needed including keeping skin clean and dry  - Evaluate need for skin moisturizer/barrier cream  - Collaborate with interdisciplinary team   - Patient/family teaching  - Consider wound care consult   Outcome: Progressing     Problem: Nutrition/Hydration-ADULT  Goal: Nutrient/Hydration intake appropriate for improving, restoring or maintaining nutritional needs  Description  Monitor and assess patient's nutrition/hydration status for malnutrition  Collaborate with interdisciplinary team and initiate plan and interventions as ordered  Monitor patient's weight and dietary intake as ordered or per policy  Utilize nutrition screening tool and intervene as necessary   Determine patient's food preferences and provide high-protein, high-caloric foods as appropriate       INTERVENTIONS:  - Monitor oral intake, urinary output, labs, and treatment plans  - Assess nutrition and hydration status and recommend course of action  - Evaluate amount of meals eaten  - Assist patient with eating if necessary   - Allow adequate time for meals  - Recommend/ encourage appropriate diets, oral nutritional supplements, and vitamin/mineral supplements  - Order, calculate, and assess calorie counts as needed  - Recommend, monitor, and adjust tube feedings and TPN/PPN based on assessed needs  - Assess need for intravenous fluids  - Provide specific nutrition/hydration education as appropriate  - Include patient/family/caregiver in decisions related to nutrition  Outcome: Progressing

## 2020-03-20 NOTE — PHYSICAL THERAPY NOTE
Physical Therapy Evaluation     Patient's Name: Jacinta العلي    Admitting Diagnosis  Dyspnea [R06 00]  Numbness and tingling of foot [R20 0, R20 2]    Problem List  Patient Active Problem List   Diagnosis    Diarrhea    Tobacco dependence    Hemoptysis    [facility]    Hypertension    History of TB (tuberculosis)    [facility]    Hyperkalemia    Lung mass    Change in bowel habits    Depression    Hearing loss    Osteoarthritis of knee    Urinary incontinence    Vitamin D deficiency    Small bowel obstruction (HCC)    Numbness and tingling of foot    Dyspnea       Past Medical History  Past Medical History:   Diagnosis Date    Current smoker     Dementia [facility]     History of tuberculosis 40 years ago treated     Hypertension     Ovarian cyst        Past Surgical History  Past Surgical History:   Procedure Laterality Date    APPENDECTOMY      CATARACT EXTRACTION      COLON SURGERY Right     COLONOSCOPY      COLONOSCOPY N/A 10/5/2017    Procedure: COLONOSCOPY;  Surgeon: Delmy Garcia MD;  Location: [facility]; Service: Gastroenterology    HYSTERECTOMY      OVARIAN CYST REMOVAL            03/20/20 3908   Note Type   Note type Eval only   Pain Assessment   Pain Assessment Tool Pain Assessment not indicated - pt denies pain   Pain Score No Pain   Home Living   Type of Home House   Home Layout Two level;Performs ADLs on one level; Able to live on main level with bedroom/bathroom  (1 JORGE)   Bathroom Shower/Tub Walk-in shower   Bathroom Toilet Standard   Bath[id number];Cane   Prior Function   Level of Cook Independent with ADLs and functional mobility   Lives With Daughter  (daughter works during the day, patient is alone during the d)   Receives Help From Family   ADL Assistance Independent   IADLs Needs assistance   Falls in the last 6 months 1 to 4  (chart review indicates recurrent falls)   Vocational Retired   Comments patient does not drive   Restrictions/Precautions   Wells Jonn Bearing Precautions Per Order No   Braces or Orthoses Other (Comment)  (none at baseline)   Other Precautions Chair Alarm; Bed Alarm; Fall Risk;Cognitive;Visual impairment;Hard of hearing   General   Family/Caregiver Present No   Cognition   Overall Cognitive Status Impaired   Arousal/Participation Cooperative   Attention Attends with cues to redirect   Orientation Level Oriented to person;Oriented to place; Disoriented to time;Disoriented to situation   Memory Decreased recall of biographical information;Decreased recall of recent events;Decreased short term memory   Following Commands Follows one step commands without difficulty   Comments patient agreeable to PT eval   RUE Assessment   RUE Assessment WFL  (4-/5)   LUE Assessment   LUE Assessment WFL  (4-/5)   RLE Assessment   RLE Assessment WFL  (grossly assessed with functional mobility: at least 3+/5)   LLE Assessment   LLE Assessment WFL  (grossly assessed with functional mobility: at least 3+/5)   Coordination   Movements are Fluid and Coordinated 1   Sensation [facility]   Light Touch   RLE Light Touch Grossly intact   LLE Light Touch Grossly intact   Bed Mobility   Rolling R 5  Supervision   Additional items Assist x 1; Increased time required;Verbal cues;HOB elevated; Bedrails   Supine to Sit 4  Minimal assistance   Additional items Assist x 1; Increased time required;Verbal cues;LE management;HOB elevated   Sit to Supine 5  Supervision   Additional items Assist x 1; Increased time required;Verbal cues;HOB elevated   Transfers   Sit to Stand 4  Minimal assistance  (CGA)   Additional items Assist x 1; Increased time required;Verbal cues   Stand to Sit 4  Minimal assistance   Additional items Assist x 1; Increased time required;Verbal cues   Ambulation/Elevation   Gait pattern Decreased foot clearance; Improper Weight shift; Inconsistent benny   Gait Assistance 4  Minimal assist   Additional items Assist x 1;Verbal cues   Assistive Device Rolling walker   Distance 10' + 15'   Stair Management Assistance Not tested   Balance   Static Sitting Good   Dynamic Sitting Fair +   Static Standing Fair   Dynamic Standing Fair -   Ambulatory Poor +   Endurance Deficit   Endurance Deficit Yes   Activity Tolerance   Activity Tolerance Patient limited by fatigue   Medical Staff Made Aware OT Rheta Remedies   Nurse Made Aware PATRICK Camp confirmed pt appropriate for therapy; made aware of session outcomes; post-session: pt returned BTB, all needs within reach and bed alarm engaged   Assessment   Prognosis Good   Problem List Decreased strength;Decreased endurance; Impaired balance;Decreased mobility; Decreased cognition;Decreased safety awareness; Impaired vision; Impaired hearing   Assessment Pt is 80 y o  female seen for PT evaluation s/p [facility] w/ Dyspnea  PT consulted to assess pt's functional mobility and d/c needs  Order placed for PT eval and tx, w/ up w/ A order  Performed at least 2 patient identifiers during session: Name and   Comorbidities affecting pt's physical performance at time of assessment include: numbness and tingling of foot, dementia, tobacco dependence, urinary incontinence, hearing loss, malnutrition  PTA, pt was independent w/ all functional mobility w/ RW vs  SPC vs  no AD, ambulates household distances, has 1 JORGE, lives w/ daughter in two level house with first floor set-up and retired  Personal factors affecting pt at time of IE include: ambulating w/ assistive device, stairs to enter home, inability to navigate level surfaces w/o external assistance, decreased cognition, limited home support, positive fall history, hearing impairments, visual impairments, limited insight into impairments, inability to perform IADLs, inability to perform ADLs and inability to live alone   Please find objective findings from PT assessment regarding body systems outlined above with impairments and limitations including weakness, impaired balance, decreased endurance, gait deviations, decreased activity tolerance, decreased functional mobility tolerance, decreased safety awareness, fall risk and decreased cognition  The following objective measures performed on IE also reveal limitations: Barthel Index: 35/100 and Modified Khadar: 4 (moderate/severe disability)  Pt's clinical presentation is currently unstable/unpredictable seen in pt's presentation of ongoing medical management/monitoring, fatigue impacting overall mobility status and need for input for mobility technique/safety  Pt to benefit from continued PT tx to address deficits as defined above and maximize level of functional independent mobility and consistency  From PT/mobility standpoint, recommendation at time of d/c would be STR pending progress in order to facilitate return to PLOF  Barriers to Discharge Decreased caregiver support   Goals   Patient Goals to get back to gardening   STG Expiration Date 03/30/20   Short Term Goal #1 In 7-10 days: Increase bilateral LE strength 1/2 grade to facilitate independent mobility, Perform all bed mobility tasks modified independent to decrease caregiver burden, Perform all transfers modified independent to improve independence, Ambulate > 50 ft  X 3 trials with least restrictive assistive device with distant S w/o LOB and w/ normalized gait pattern 100% of the time, Navigate 1 stair with distant S with unilateral handrail to facilitate return to previous living environment, Increase all balance 1 grade to decrease risk for falls and Tolerate 4 hr OOB to faciliate upright tolerance   PT Treatment Day 0   Plan   Treatment/Interventions Functional transfer training;LE strengthening/ROM; Elevations; Therapeutic exercise; Endurance training;Patient/family training;Equipment eval/education; Bed mobility;Gait training;Spoke to nursing;OT   PT Frequency Other (Comment)  (3-5x/wk) Recommendation   Recommendation Short-term skilled PT   PT - OK to Discharge Yes  (when medically cleared; if to STR)   Modified Bethel Scale   Modified Bethel Scale 4   Barthel Index   Feeding 5   Bathing 0   Grooming Score 0   Dressing Score 5   Bladder Score 5   Bowels Score 5   Toilet Use Score 5   Transfers (Bed/Chair) Score 10   Mobility (Level Surface) Score 0   Stairs Score 0   Barthel Index Score 35         Ran Fix, PT, DPT

## 2020-03-20 NOTE — H&P
[address] Internal Medicine  H&P- Sierra Osman [birth year], 80 y o  female MRN: [id number]    Unit/Bed#: ED 12 Encounter: [id number]    Primary Care Provider: Ailyn Broussard MD   Date and time admitted to hospital: 3/19/2020  4:40 PM        Dyspnea  Assessment & Plan  Patient had sudden onset and resolution of shortness of breath  Patient reports that she is short of breath every morning when she wakes up she has significant coughing and then improves denies any dyspnea with exertion  Reports chronic productive cough  Patient smokes 1 pack of cigarettes daily, will order nicotine patch in patient  Chest x-ray completed showing Findings consistent with emphysema and pulmonary fibrosis  No signs of infection noted on exam, antibiotics not indicated  Patient appears to be oxygenating in the high 90s on room air  Overnight telemetry monitoring for possible arrhythmias     Numbness and tingling of foot  Assessment & Plan  Chronic, apparently only occurs when sitting in a certain chair  Follow-up with PCP outpatient         VTE Prophylaxis: Enoxaparin (Lovenox)  / sequential compression device   Code Status:  Full code  POLST: POLST form is not discussed and not completed at this time  Discussion with family:  Not available at this time    Anticipated Length of Stay:  Patient will be admitted on an Observation basis with an anticipated length of stay of  < 2 midnights  Justification for Hospital Stay:  Monitoring of telemetry overnight    Total Time for Visit, including Counseling / Coordination of Care: 1 hour  Greater than 50% of this total time spent on direct patient counseling and coordination of care  Chief Complaint:   Dyspnea     Chief Complaint   Patient presents with    Numbness     Patient c/o bilateral feet numbness that has been ongoing for several months  Patient states"she is having a hard time getting around today"  History of Present Illness:     Sierra Osman is a 80 y o  female who presents with dyspnea and lower extremity/foot numbness  Patient reports that lower extremity numbness is chronic today she thinks that it might have been worse in her left foot that her right foot however it is all back to normal now  Reports that this numbness only occurs when she sits any certain chair  Patient also complained of a sudden onset and resolution of shortness of breath this afternoon  Patient reports that she typically is short of breath every morning when she wakes up however this resolves and then she is fine the rest of the day  She ambulates with a walker reports that she fatigues easily however does not report dyspnea with exertion  Denies any chest pain chest tightness shortness of breath or difficulty breathing currently  Denies any lightheadedness dizziness blurry vision does report some difficulty seeing secondary to her glasses not working  Denies any abdominal pain nausea vomiting or diarrhea  Denies any burning urgency or frequency with urination  Offers no additional complaints  Patient does smoke 1 pack of cigarettes daily denies any alcohol use    Review of Systems:    Review of Systems   Constitutional: Negative  HENT: Negative  Eyes: Negative  Respiratory: Positive for cough and shortness of breath  Cardiovascular: Negative  Gastrointestinal: Negative  Endocrine: Negative  Genitourinary: Negative  Musculoskeletal: Negative  Neurological: Positive for weakness and numbness  Hematological: Negative  Psychiatric/Behavioral: Negative  All other systems reviewed and are negative        Past Medical and Surgical History:     Past Medical History:   Diagnosis Date    Current smoker     [facility]     History of tuberculosis 36 years ago treated     Hypertension     Ovarian cyst        Past Surgical History:   Procedure Laterality Date    APPENDECTOMY      CATARACT EXTRACTION      COLON SURGERY Right     COLONOSCOPY      COLONOSCOPY N/A 10/5/2017    Procedure: COLONOSCOPY;  Surgeon: Abhay Crowder MD;  Location: [facility]; Service: Gastroenterology    HYSTERECTOMY      OVARIAN CYST REMOVAL         Meds/Allergies:    Prior to Admission medications    Medication Sig Start Date End Date Taking? Authorizing Provider   acetaminophen (TYLENOL) 650 mg CR tablet Take 650 mg by mouth every 8 (eight) hours as needed for mild pain    Historical Provider, MD   amLODIPine (NORVASC) 5 mg tablet Take 5 mg by mouth daily    Historical Provider, MD   aspirin (ASPIRIN ADULT LOW DOSE) 81 mg EC tablet Take by mouth    Historical Provider, MD   donepezil (ARICEPT) 10 mg tablet Take 10 mg by mouth daily    Historical Provider, MD   nicotine (NICODERM CQ) 14 mg/24hr TD 24 hr patch Place 1 patch on the skin daily 11/24/19   Tomás LOREDO MD   sertraline (ZOLOFT) 25 mg tablet Take 25 mg by mouth daily      Historical Provider, MD SALMERON have reviewed home medications using allscripts  Allergies: Allergies   Allergen Reactions    Penicillins Other (See Comments)     Unknown reaction       Social History:     Marital Status:     Occupation:  [employer]  Patient Pre-hospital Living Situation:  Private residence  Patient Pre-hospital Level of Mobility:  Walker  Patient Pre-hospital Diet Restrictions:  None  Substance Use History:   Social History     Substance and Sexual Activity   Alcohol Use Never    Frequency: Never     Social History     Tobacco Use   Smoking Status Current Every Day Smoker    Packs/day: 1 00    Types: Cigarettes   Smokeless Tobacco Current User     Social History     Substance and Sexual Activity   Drug Use No       Family History:    Family History   Problem Relation Age of Onset    No Known Problems Mother     Hypertension Father        Physical Exam:     Vitals:   Blood Pressure: 130/56 (03/19/20 2004)  Pulse: 91 (03/19/20 2004)  Temperature: 98 6 °F (37 °C) (03/19/20 1650)  Temp Source: Oral (03/19/20 1650)  Respirations: 18 (03/19/20 2004)  Height: 5' 2" (157 5 cm) (03/19/20 1650)  Weight - Scale: 26 5 kg (58 lb 6 8 oz) (03/19/20 1650)  SpO2: 97 % (03/19/20 2004)    Physical Exam   Constitutional: She is oriented to person, place, and time  She appears well-developed  She appears cachectic  She has a sickly appearance  Eyes: Pupils are equal, round, and reactive to light  Neck: Normal range of motion  Cardiovascular: Normal rate  Pulmonary/Chest: Effort normal and breath sounds normal  No respiratory distress  Abdominal: Soft  Bowel sounds are normal    Musculoskeletal: Normal range of motion  Neurological: She is alert and oriented to person, place, and time  She has normal strength  A sensory deficit is present  No cranial nerve deficit  Decreased sensation, mild numbness, and paresthesia of bilateral lower extremities   Skin: Skin is warm and dry  Patient had some mild ruddiness of her lower extremities   Psychiatric: She has a normal mood and affect  Her behavior is normal  Judgment and thought content normal    Nursing note and vitals reviewed  Additional Data:     Lab Results: I have personally reviewed pertinent reports  Results from last 7 days   Lab Units 03/19/20  1713   WBC Thousand/uL 11 99*   HEMOGLOBIN g/dL 12 4   HEMATOCRIT % 42 6   PLATELETS Thousands/uL 417*   NEUTROS PCT % 81*   LYMPHS PCT % 12*   MONOS PCT % 6   EOS PCT % 1     Results from last 7 days   Lab Units 03/19/20  1713   SODIUM mmol/L 140   POTASSIUM mmol/L 4 2   CHLORIDE mmol/L 105   CO2 mmol/L 28   BUN mg/dL 16   CREATININE mg/dL 0 84   ANION GAP mmol/L 7   CALCIUM mg/dL 8 4   ALBUMIN g/dL 2 6*   TOTAL BILIRUBIN mg/dL 0 40   ALK PHOS U/L 64   ALT U/L 9*   AST U/L 12   GLUCOSE RANDOM mg/dL 85                       Imaging: I have personally reviewed pertinent reports  XR chest 2 views   Final Result by Tamra Dumont MD (03/19 1752)      Findings consistent with emphysema and pulmonary fibrosis        No evidence of acute abnormality in the chest             Workstation performed: [id number]         CT head without contrast   Final Result by Vanesa Tee MD (03/19 1754)      No acute intracranial hemorrhage  Microangiopathic changes  Workstation performed: [id number]             EKG, Pathology, and Other Studies Reviewed on Admission:   · EKG:  No change from November 2019, normal sinus rhythm    Allscripts / Epic Records Reviewed: Yes     ** Please Note: This note has been constructed using a voice recognition system   **

## 2020-03-20 NOTE — PROGRESS NOTES
Progress Note Jessie Fulton [birth year], 80 y o  female MRN: [id number]    Unit/Bed#: -01 Encounter: [id number]    Primary Care Provider: Linda Dominique MD   Date and time admitted to hospital: 3/19/2020  4:40 PM        * Dyspnea  Assessment & Plan  · Patient had sudden onset and resolution of shortness of breath  Patient reports that she is short of breath every morning when she wakes up she has significant coughing and then improves denies any dyspnea with exertion  · This appears to be a chronic issue per records  · Reports chronic productive cough  · Patient smokes 1 pack of cigarettes daily, will order nicotine patch in patient  · Chest x-ray (3/19/2020): Findings consistent with emphysema and pulmonary fibrosis  · No signs of infection noted on exam, antibiotics not indicated  Lung sounds are clear  · Oxygen saturation levels 93-97% on room air since admission  · Telemetry overnight with no events  · Will get PT/OT eval for safe discharge planning  Patient states she has had multiple falls at home  Numbness and tingling of foot  Assessment & Plan  · Chronic, apparently only occurs when sitting in a certain chair  · Follow-up with PCP outpatient    VTE Pharmacologic Prophylaxis:   Pharmacologic: Heparin  Mechanical VTE Prophylaxis in Place: Yes    Patient Centered Rounds: I have performed bedside rounds with nursing staff today  Discussions with Specialists or Other Care Team Provider: None    Education and Discussions with Family / Patient: Patient    Time Spent for Care: 30 minutes  More than 50% of total time spent on counseling and coordination of care as described above  Current Length of Stay: 0 day(s)    Current Patient Status: Observation   Certification Statement: The patient will continue to require additional inpatient hospital stay due to PT/OT evaluation for safe discharge plan      Discharge Plan: Not medically cleared    Code Status: Level 1 - Full Code      Subjective: Patient sitting at the edge of the bed eating her breakfast   She states that she is feeling great this morning and that her shortness of breath is gone  Patient denies any chest pain, fever, or chills  Patient states that she is always short of breath in the morning when she wakes up  She states she is not short of breath with activity  States that her numbness/tingling in her legs is unchanged  Patient states that she still smoke at home  Objective:     Vitals:   Temp (24hrs), Av °F (36 7 °C), Min:97 4 °F (36 3 °C), Max:98 6 °F (37 °C)    Temp:  [97 4 °F (36 3 °C)-98 6 °F (37 °C)] 97 4 °F (36 3 °C)  HR:  [58-91] 58  Resp:  [16-22] 16  BP: (126-170)/(41-72) 128/41  SpO2:  [93 %-98 %] 93 %  Body mass index is 10 69 kg/m²  Input and Output Summary (last 24 hours): Intake/Output Summary (Last 24 hours) at 3/20/2020 0915  Last data filed at 3/20/2020 0207  Gross per 24 hour   Intake    Output 200 ml   Net -200 ml       Physical Exam:     Physical Exam   Constitutional: She is oriented to person, place, and time  Vital signs are normal  She appears cachectic  HENT:   Head: Normocephalic and atraumatic  Right Ear: Hearing normal    Left Ear: Hearing normal    Nose: Nose normal    Mouth/Throat: Mucous membranes are normal    Eyes: Conjunctivae, EOM and lids are normal    Neck: Normal range of motion  Cardiovascular: Normal rate, regular rhythm, normal heart sounds and intact distal pulses  No murmur heard  Pulses:       Radial pulses are 2+ on the right side, and 2+ on the left side  Dorsalis pedis pulses are 2+ on the right side, and 2+ on the left side  Pulmonary/Chest: Effort normal and breath sounds normal  No respiratory distress  She has no wheezes  She has no rales  Abdominal: Soft  Normal appearance and bowel sounds are normal  She exhibits no distension  There is no tenderness  Musculoskeletal: Normal range of motion  She exhibits no edema or deformity  Neurological: She is alert and oriented to person, place, and time  Skin: Skin is warm and dry  Capillary refill takes less than 2 seconds  Bruising (scattered to upper extremities) noted  No rash noted  No erythema  Psychiatric: She has a normal mood and affect  Her speech is normal and behavior is normal  Judgment and thought content normal  Cognition and memory are normal    Vitals reviewed  Additional Data:     Labs:    Results from last 7 days   Lab Units 03/19/20  1713   WBC Thousand/uL 11 99*   HEMOGLOBIN g/dL 12 4   HEMATOCRIT % 42 6   PLATELETS Thousands/uL 417*   NEUTROS PCT % 81*   LYMPHS PCT % 12*   MONOS PCT % 6   EOS PCT % 1     Results from last 7 days   Lab Units 03/19/20  1713   SODIUM mmol/L 140   POTASSIUM mmol/L 4 2   CHLORIDE mmol/L 105   CO2 mmol/L 28   BUN mg/dL 16   CREATININE mg/dL 0 84   ANION GAP mmol/L 7   CALCIUM mg/dL 8 4   ALBUMIN g/dL 2 6*   TOTAL BILIRUBIN mg/dL 0 40   ALK PHOS U/L 64   ALT U/L 9*   AST U/L 12   GLUCOSE RANDOM mg/dL 85                           * I Have Reviewed All Lab Data Listed Above  * Additional Pertinent Lab Tests Reviewed:  All Labs Within Last 24 Hours Reviewed    Imaging:    Imaging Reports Reviewed Today Include: Chest x-ray  Imaging Personally Reviewed by Myself Includes:  None    Recent Cultures (last 7 days):           Last 24 Hours Medication List:     Current Facility-Administered Medications:  acetaminophen 650 mg Oral Q6H PRN Mystic Leanne, CRNP   amLODIPine 5 mg Oral Daily Mystic Leanne, CRNP   aspirin 81 mg Oral Daily Mystic Leanne, CRNP   calcium carbonate 1,000 mg Oral Daily PRN Mystic Leanne, CRNP   docusate sodium 100 mg Oral BID PRN Mystic Leanne, CRNP   donepezil 10 mg Oral Daily Mystic Leanne, CRNP   heparin (porcine) 5,000 Units Subcutaneous Q8H [address] Kj Rosen PA-C   ipratropium-albuterol 3 mL Nebulization Q6H PRN Brennen Choe MD   nicotine 1 patch Transdermal Daily Mystic Leanne, CRNP   ondansetron 4 mg Intravenous Q6H PRN MARVEL Soriano   sertraline 25 mg Oral Daily MARVEL Soriano        Today, Patient Was Seen By: MARVEL Malhotra    ** Please Note: Dictation voice to text software may have been used in the creation of this document   **

## 2020-03-20 NOTE — OCCUPATIONAL THERAPY NOTE
Occupational Therapy Evaluation        Patient Name: Rose Mary Moss  [id number] Date: [birth year] 03/20/20 1129   Note Type   Note type Eval only   Restrictions/Precautions   Weight Bearing Precautions Per Order No   Braces or Orthoses Other (Comment)  (none at baseline)   Other Precautions Chair Alarm; Bed Alarm; Fall Risk   Pain Assessment   Pain Assessment Tool Pain Assessment not indicated - pt denies pain   Pain Score No Pain   Home Living   Type of Home House   Home Layout Two level;Performs ADLs on one level; Able to live on main level with bedroom/bathroom  (1 JORGE)   Bathroom Shower/Tub Walk-in shower   Bathroom Toilet Standard   [facility] [id number];Cane   Prior Function   Level of Kevin Independent with ADLs and functional mobility   Lives With Daughter  (daughter works during the day, patient is alone during the d)   Omid Help From Family   ADL Assistance Independent   IADLs Needs assistance   Falls in the last 6 months 1 to 4  (chart review indicates recurrent falls, pt reported a couple)   Vocational Retired   Comments patient does not drive   Lifestyle   Autonomy Patient reported independent with ADLs, nees assistance with IADLs  patient ambulates with RW, ' not all the time the bathroom is just around the corner" Patient lives with her daughter in a 2 story house,  1 [address]ORGE   Daughter usually works, patient stays alone with her pets (per patient report)   Reciprocal Relationships Supportive daughter   Intrinsic Gratification "I enjoy my pets"   Psychosocial   Psychosocial (WDL) WDL   ADL   Eating Assistance 5  Supervision/Setup   Grooming Assistance 5  Supervision/Setup   UB Bathing Assistance 4  Minimal Assistance   LB [address] 3  Moderate [address] 3  Moderate [address]  3  Moderate Assistance Functional Assistance 3  Moderate Assistance   Bed Mobility   Rolling R 5  Supervision   Additional items Assist x 1;HOB elevated; Increased time required;Verbal cues;LE management   Supine to Sit 4  Minimal assistance   Additional items Assist x 1; Increased time required;Verbal cues;HOB elevated   Transfers   Sit to Stand 5  Supervision   Additional items Assist x 1; Increased time required;Verbal cues   Functional Mobility   Functional Mobility 4  Minimal assistance   Additional items Rolling walker   Balance   Static Sitting Good   Dynamic Sitting Fair +   Static Standing Fair +   Dynamic Standing Fair   Activity Tolerance   Activity Tolerance Patient limited by fatigue   RUE Assessment   RUE Assessment WFL  (4-/5)   LUE Assessment   LUE Assessment WFL  (4-/5)   Hand Function   Gross Motor Coordination Impaired   Fine Motor Coordination Functional   Sensation   Light Touch No apparent deficits  (BUEs)   Vision-Basic Assessment   Current Vision Does not wear glasses   Patient Visual Report   (visual impairment, unable to identify details/ numbers or le)   Cognition   Overall Cognitive Status Impaired   Arousal/Participation Alert; Responsive; Cooperative   Attention Attends with cues to redirect   Orientation Level Oriented to person;Oriented to place; Disoriented to time;Disoriented to situation   Memory Decreased recall of biographical information;Decreased recall of recent events;Decreased short term memory   Following Commands Follows one step commands without difficulty   Assessment   Limitation Decreased ADL status; Decreased Safe judgement during ADL;Decreased cognition;Decreased endurance;Decreased fine motor control;Decreased self-care trans;Decreased high-level ADLs   Prognosis Good   Assessment Patient is a 80 y o  female seen for OT evaluation s/p admit to [id number] [facility] on 3/19/2020 w/Dyspnea   Commorbidities affecting patient's functional performance at time of assessment include: Numbness and tingling of left foot, current smoker, Dementia, HTN, presented to Ed with c/o of c/o bilateral feet numbness that has been ongoing for several months  Patient stated in the ED: "she is having a hard time getting around today"  Orders placed for OT evaluation and treatment   Performed at least two patient identifiers during session including name and wristband  Prior to admission, Patient reported independent with ADLs, nees assistance with IADLs  patient ambulates with RW, ' not all the time the bathroom is just around the corner" Patient lives with her daughter in a 2 story house,  1 JORGE  Daughter usually works, patient stays alone with her pets (per patient report)   Personal factors affecting patient at time of initial evaluation include: limited caregiver support, steps to enter, limited insight into deficits, non compliance, flat affect, decreased initiation and engagement, difficulty performing ADLs and difficulty performing IADLs  Upon evaluation, patient requires minimal  assist for UB ADLs, moderate assist for LB ADLs, transfers and functional ambulation in room and bathroom with minimal  assist, with the use of Rolling Walker  Occupational performance is affected by the following deficits: orientation, degenerative arthritic joint changes, impaired gross motor coordination, dynamic sit/ stand balance deficit with poor standing tolerance time for self care and functional mobility, decreased activity tolerance, impaired memory, impaired problem solving, decreased safety awareness, decreased coping skills and postural control and postural alignment deficit, requiring external assistance to complete transitional movements   Therapist completed  extensive additional review of medical records and additional review of physical, cognitive or psychosocial history, clinical examination identifying 5 or more performance deficits, clinical decision making of a high complexity , consistent with a high complexity level evaluation  Patient to benefit from continued Occupational Therapy treatment while in the hospital to address deficits as defined above and maximize level of functional independence with ADLs and functional mobility  Goals   Patient Goals to get back to gardening   Plan   Treatment Interventions ADL retraining;Functional transfer training;UE strengthening/ROM; Endurance training;Patient/family training;Fine motor coordination activities; Compensatory technique education;Continued evaluation; Energy conservation; Activityengagement   Goal Expiration Date 04/03/20   OT Frequency 3-5x/wk   Recommendation   OT Discharge Recommendation Short Term Rehab   Barthel Index   Feeding 5   Bathing 0   Grooming Score 0   Dressing Score 5   Bladder Score 5   Bowels Score 5   Toilet Use Score 5   Transfers (Bed/Chair) Score 10   Mobility (Level Surface) Score 0   Stairs Score 0   Barthel Index Score 35   Modified Longview Scale   Modified [place]view Scale 4     Occupational Performance areas to address include: grooming , bathing/ shower, dressing, toilet hygiene, transfer to all surfaces, functional ambulation, functional mobility, health maintenance, medication routine/ management, IADLS: Household maintenance, IADLs: safety procedures, IADLs: meal prep/ clean up, Leisure Participation and Social participation  From OT standpoint, recommendation at time of d/c would be Short Term Rehab         1 - Patient will verbalize and demonstrate use of energy conservation/ deep breathing technique and work simplification skills during functional activity with no verbal cues  2 - Patient will verbalize and demonstrate good body mechanics and joint protection techniques during  ADLs/ IADLs with no verbal cues  3 - Patient will increase OOB/ sitting tolerance to 2-4 hours per day for increased participation in self care and leisure tasks with no s/s of exertion      4 - Patient will increase standing tolerance time to 5  minutes with unilateral UE support to complete sink level ADLs@ mod I level  5 - Patient will increase sitting tolerance at edge of bed to 20 minutes to complete UB ADLs @ set up assist level  6 - Patient will transfer bed to Chair / toilet at Set up assist level with AD as indicated  7 - Patient will complete UB ADLs with set up assist      8 - Patient will complete LB ADLs with min assist with the use of adaptive equipment       9 - Patient will complete toileting hygiene with set up assist/ supervision for thoroughness    10 - Patient/ Family  will demonstrate competency with UE Home Exercise Program

## 2020-03-20 NOTE — DISCHARGE SUMMARY
Discharge- Dolly Bekah [birth year], 80 y o  female MRN: [id number]    Unit/Bed#: -01 Encounter: [id number]    Primary Care Provider: Mich Dai MD   Date and time admitted to hospital: 3/19/2020  4:40 PM        * Dyspnea  Assessment & Plan  · Patient had sudden onset and resolution of shortness of breath  Patient reports that she is short of breath every morning when she wakes up she has significant coughing and then improves denies any dyspnea with exertion  · This appears to be a chronic issue per records  · Reports chronic productive cough  · Patient smokes 1 pack of cigarettes daily  · Chest x-ray (3/19/2020): Findings consistent with emphysema and pulmonary fibrosis  · No signs of infection noted on exam, antibiotics not indicated  Lung sounds are clear  · Oxygen saturation levels 93-97% on room air since admission  · Telemetry overnight with no events  · Patient states she has had multiple falls at home, PT/OT recommending STR  · Shortness of breath has resolved    Numbness and tingling of foot  Assessment & Plan  · Chronic, apparently only occurs when sitting in a certain chair  · Follow-up with PCP outpatient      Discharging Physician / Practitioner: MARVEL Rey  PCP: Mich Dai MD  Admission Date:   Admission Orders (From admission, onward)     Ordered        03/19/20 2030  Place in Observation  Once         03/19/20 2026  Place in Observation  Once                   Discharge Date: 03/20/20    Resolved Problems  Date Reviewed: 3/20/2020    None          Consultations During Hospital Stay:  · PT/OT    Procedures Performed:   · None    Significant Findings / Test Results:   · Chest x-ray (3/19/2020): Findings consistent with emphysema and pulmonary fibrosis  No evidence of acute abnormality in the chest   · CT Head without contrast (3/19/2020): No acute intracranial hemorrhage  Microangiopathic changes      Incidental Findings:   · None    Test Results Pending at Discharge (will require follow up): · None     Outpatient Tests Requested:  · None    Complications:  None    Reason for Admission: Patient was admitted for dyspnea  Hospital Course: Vanessa Gao is a 80 y o  female patient who originally presented to the hospital on 3/19/2020 due to dyspnea and lower extremity/foot numbness  Patient reports that the numbness and tingling is actually chronic for her as well as the shortness of breath  Patient states that she is chronically short of breath when she wakes up in the morning  Patient was monitored overnight and had no significant events on the nereida or the telemetry monitor  She stated that her shortness of breath was gone and she felt good  PT/OT was consulted because patient made mention that she has had multiple falls at home recently  Patient states that the shortness of breath has resolved and she feels fine  Patient does not meet criteria for 3 midnight rule  Discharge from acute care is necessary in order for the acute care setting to keep beds available for seriously ill patients  PT/OT recommendations are STR for daily skilled level of care  Patient is now medically stable for discharge  Please see above list of diagnoses and related plan for additional information  Condition at Discharge: stable     Discharge Day Visit / Exam:     * Please refer to separate progress note for these details *    Discussion with Family: Daughter, Tripp Celis    Discharge instructions/Information to patient and family:   See after visit summary for information provided to patient and family  Provisions for Follow-Up Care:  See after visit summary for information related to follow-up care and any pertinent home health orders  Disposition:     Other [place] at [employer] [facility] and [employer]  Planned Readmission: No     Discharge Statement:  I spent 60 minutes discharging the patient   This time was spent on the day of discharge  I had direct contact with the patient on the day of discharge  Greater than 50% of the total time was spent examining patient, answering all patient questions, arranging and discussing plan of care with patient as well as directly providing post-discharge instructions  Additional time then spent on discharge activities  Discharge Medications:  See after visit summary for reconciled discharge medications provided to patient and family        ** Please Note: This note has been constructed using a voice recognition system *

## 2020-03-23 ENCOUNTER — TRANSITIONAL CARE MANAGEMENT (OUTPATIENT)
Dept: FAMILY MEDICINE CLINIC | Facility: MEDICAL CENTER | Age: 85
End: 2020-03-23

## 2020-05-31 ENCOUNTER — APPOINTMENT (EMERGENCY)
Dept: CT IMAGING | Facility: HOSPITAL | Age: 85
DRG: 872 | End: 2020-05-31
Payer: MEDICARE

## 2020-05-31 ENCOUNTER — APPOINTMENT (EMERGENCY)
Dept: RADIOLOGY | Facility: HOSPITAL | Age: 85
DRG: 872 | End: 2020-05-31
Payer: MEDICARE

## 2020-05-31 ENCOUNTER — HOSPITAL ENCOUNTER (INPATIENT)
Facility: HOSPITAL | Age: 85
LOS: 3 days | Discharge: NURSING, REHAB OR LONG-TERM CARE | DRG: 872 | End: 2020-06-03
Attending: FAMILY MEDICINE | Admitting: FAMILY MEDICINE
Payer: MEDICARE

## 2020-05-31 DIAGNOSIS — E44.0 MODERATE PROTEIN-CALORIE MALNUTRITION (HCC): ICD-10-CM

## 2020-05-31 DIAGNOSIS — T68.XXXA HYPOTHERMIA: ICD-10-CM

## 2020-05-31 DIAGNOSIS — J47.1 BRONCHIECTASIS WITH ACUTE EXACERBATION (HCC): ICD-10-CM

## 2020-05-31 DIAGNOSIS — N39.0 UTI (URINARY TRACT INFECTION): Primary | ICD-10-CM

## 2020-05-31 DIAGNOSIS — H54.61 VISION LOSS, RIGHT EYE: ICD-10-CM

## 2020-05-31 PROBLEM — R06.02 SHORTNESS OF BREATH: Status: ACTIVE | Noted: 2020-03-19

## 2020-05-31 PROBLEM — J18.9 PNEUMONIA: Status: ACTIVE | Noted: 2020-05-31

## 2020-05-31 PROBLEM — A41.9 SEPSIS (HCC): Status: ACTIVE | Noted: 2020-05-31

## 2020-05-31 LAB
ALBUMIN SERPL BCP-MCNC: 2.9 G/DL (ref 3.5–5)
ALP SERPL-CCNC: 55 U/L (ref 46–116)
ALT SERPL W P-5'-P-CCNC: 12 U/L (ref 12–78)
ANION GAP SERPL CALCULATED.3IONS-SCNC: 8 MMOL/L (ref 4–13)
AST SERPL W P-5'-P-CCNC: 13 U/L (ref 5–45)
ATRIAL RATE: 66 BPM
BACTERIA UR QL AUTO: ABNORMAL /HPF
BASOPHILS # BLD AUTO: 0.05 THOUSANDS/ΜL (ref 0–0.1)
BASOPHILS NFR BLD AUTO: 0 % (ref 0–1)
BILIRUB SERPL-MCNC: 0.2 MG/DL (ref 0.2–1)
BILIRUB UR QL STRIP: NEGATIVE
BUN SERPL-MCNC: 16 MG/DL (ref 5–25)
CALCIUM SERPL-MCNC: 8.6 MG/DL (ref 8.3–10.1)
CHLORIDE SERPL-SCNC: 109 MMOL/L (ref 100–108)
CLARITY UR: ABNORMAL
CO2 SERPL-SCNC: 28 MMOL/L (ref 21–32)
COLOR UR: YELLOW
CREAT SERPL-MCNC: 0.97 MG/DL (ref 0.6–1.3)
D DIMER PPP FEU-MCNC: 1.9 UG/ML FEU
EOSINOPHIL # BLD AUTO: 0.09 THOUSAND/ΜL (ref 0–0.61)
EOSINOPHIL NFR BLD AUTO: 1 % (ref 0–6)
ERYTHROCYTE [DISTWIDTH] IN BLOOD BY AUTOMATED COUNT: 15.6 % (ref 11.6–15.1)
GFR SERPL CREATININE-BSD FRML MDRD: 52 ML/MIN/1.73SQ M
GLUCOSE SERPL-MCNC: 110 MG/DL (ref 65–140)
GLUCOSE UR STRIP-MCNC: NEGATIVE MG/DL
HCT VFR BLD AUTO: 39.3 % (ref 34.8–46.1)
HGB BLD-MCNC: 11.7 G/DL (ref 11.5–15.4)
HGB UR QL STRIP.AUTO: NEGATIVE
IMM GRANULOCYTES # BLD AUTO: 0.07 THOUSAND/UL (ref 0–0.2)
IMM GRANULOCYTES NFR BLD AUTO: 1 % (ref 0–2)
KETONES UR STRIP-MCNC: NEGATIVE MG/DL
LEUKOCYTE ESTERASE UR QL STRIP: ABNORMAL
LYMPHOCYTES # BLD AUTO: 0.77 THOUSANDS/ΜL (ref 0.6–4.47)
LYMPHOCYTES NFR BLD AUTO: 7 % (ref 14–44)
MAGNESIUM SERPL-MCNC: 2.1 MG/DL (ref 1.6–2.6)
MCH RBC QN AUTO: 27 PG (ref 26.8–34.3)
MCHC RBC AUTO-ENTMCNC: 29.8 G/DL (ref 31.4–37.4)
MCV RBC AUTO: 91 FL (ref 82–98)
MONOCYTES # BLD AUTO: 0.72 THOUSAND/ΜL (ref 0.17–1.22)
MONOCYTES NFR BLD AUTO: 6 % (ref 4–12)
NEUTROPHILS # BLD AUTO: 10.14 THOUSANDS/ΜL (ref 1.85–7.62)
NEUTS SEG NFR BLD AUTO: 85 % (ref 43–75)
NITRITE UR QL STRIP: NEGATIVE
NON-SQ EPI CELLS URNS QL MICRO: ABNORMAL /HPF
NRBC BLD AUTO-RTO: 0 /100 WBCS
P AXIS: 74 DEGREES
PH UR STRIP.AUTO: 6 [PH]
PLATELET # BLD AUTO: 426 THOUSANDS/UL (ref 149–390)
PLATELET # BLD AUTO: 460 THOUSANDS/UL (ref 149–390)
PMV BLD AUTO: 8.8 FL (ref 8.9–12.7)
PMV BLD AUTO: 9.1 FL (ref 8.9–12.7)
POTASSIUM SERPL-SCNC: 4.7 MMOL/L (ref 3.5–5.3)
PR INTERVAL: 142 MS
PROT SERPL-MCNC: 7.6 G/DL (ref 6.4–8.2)
PROT UR STRIP-MCNC: NEGATIVE MG/DL
QRS AXIS: 5 DEGREES
QRSD INTERVAL: 84 MS
QT INTERVAL: 422 MS
QTC INTERVAL: 442 MS
RBC # BLD AUTO: 4.33 MILLION/UL (ref 3.81–5.12)
RBC #/AREA URNS AUTO: ABNORMAL /HPF
SARS-COV-2 RNA RESP QL NAA+PROBE: NEGATIVE
SODIUM SERPL-SCNC: 145 MMOL/L (ref 136–145)
SP GR UR STRIP.AUTO: 1.01 (ref 1–1.03)
T WAVE AXIS: 74 DEGREES
TROPONIN I SERPL-MCNC: <0.02 NG/ML
TSH SERPL DL<=0.05 MIU/L-ACNC: 2 UIU/ML (ref 0.36–3.74)
UROBILINOGEN UR QL STRIP.AUTO: 0.2 E.U./DL
VENTRICULAR RATE: 66 BPM
WBC # BLD AUTO: 11.84 THOUSAND/UL (ref 4.31–10.16)
WBC #/AREA URNS AUTO: ABNORMAL /HPF

## 2020-05-31 PROCEDURE — 84484 ASSAY OF TROPONIN QUANT: CPT | Performed by: PHYSICIAN ASSISTANT

## 2020-05-31 PROCEDURE — 85049 AUTOMATED PLATELET COUNT: CPT | Performed by: FAMILY MEDICINE

## 2020-05-31 PROCEDURE — 99285 EMERGENCY DEPT VISIT HI MDM: CPT

## 2020-05-31 PROCEDURE — 85379 FIBRIN DEGRADATION QUANT: CPT | Performed by: FAMILY MEDICINE

## 2020-05-31 PROCEDURE — 83735 ASSAY OF MAGNESIUM: CPT | Performed by: PHYSICIAN ASSISTANT

## 2020-05-31 PROCEDURE — 99223 1ST HOSP IP/OBS HIGH 75: CPT | Performed by: FAMILY MEDICINE

## 2020-05-31 PROCEDURE — 93010 ELECTROCARDIOGRAM REPORT: CPT | Performed by: INTERNAL MEDICINE

## 2020-05-31 PROCEDURE — 84443 ASSAY THYROID STIM HORMONE: CPT | Performed by: PHYSICIAN ASSISTANT

## 2020-05-31 PROCEDURE — 36415 COLL VENOUS BLD VENIPUNCTURE: CPT | Performed by: PHYSICIAN ASSISTANT

## 2020-05-31 PROCEDURE — 71046 X-RAY EXAM CHEST 2 VIEWS: CPT

## 2020-05-31 PROCEDURE — 85025 COMPLETE CBC W/AUTO DIFF WBC: CPT | Performed by: PHYSICIAN ASSISTANT

## 2020-05-31 PROCEDURE — 93005 ELECTROCARDIOGRAM TRACING: CPT

## 2020-05-31 PROCEDURE — 81001 URINALYSIS AUTO W/SCOPE: CPT | Performed by: PHYSICIAN ASSISTANT

## 2020-05-31 PROCEDURE — 87635 SARS-COV-2 COVID-19 AMP PRB: CPT | Performed by: PHYSICIAN ASSISTANT

## 2020-05-31 PROCEDURE — 99285 EMERGENCY DEPT VISIT HI MDM: CPT | Performed by: PHYSICIAN ASSISTANT

## 2020-05-31 PROCEDURE — 80053 COMPREHEN METABOLIC PANEL: CPT | Performed by: PHYSICIAN ASSISTANT

## 2020-05-31 PROCEDURE — 84145 PROCALCITONIN (PCT): CPT | Performed by: FAMILY MEDICINE

## 2020-05-31 PROCEDURE — 70450 CT HEAD/BRAIN W/O DYE: CPT

## 2020-05-31 RX ORDER — ASPIRIN 81 MG/1
81 TABLET ORAL DAILY
Status: DISCONTINUED | OUTPATIENT
Start: 2020-05-31 | End: 2020-06-03 | Stop reason: HOSPADM

## 2020-05-31 RX ORDER — DOCUSATE SODIUM 100 MG/1
100 CAPSULE, LIQUID FILLED ORAL 2 TIMES DAILY PRN
Status: DISCONTINUED | OUTPATIENT
Start: 2020-05-31 | End: 2020-06-03 | Stop reason: HOSPADM

## 2020-05-31 RX ORDER — DONEPEZIL HYDROCHLORIDE 5 MG/1
10 TABLET, FILM COATED ORAL DAILY
Status: DISCONTINUED | OUTPATIENT
Start: 2020-05-31 | End: 2020-06-03 | Stop reason: HOSPADM

## 2020-05-31 RX ORDER — SERTRALINE HYDROCHLORIDE 25 MG/1
25 TABLET, FILM COATED ORAL DAILY
Status: DISCONTINUED | OUTPATIENT
Start: 2020-05-31 | End: 2020-06-03 | Stop reason: HOSPADM

## 2020-05-31 RX ORDER — AMLODIPINE BESYLATE 5 MG/1
5 TABLET ORAL DAILY
Status: DISCONTINUED | OUTPATIENT
Start: 2020-05-31 | End: 2020-06-03 | Stop reason: HOSPADM

## 2020-05-31 RX ORDER — SULFAMETHOXAZOLE AND TRIMETHOPRIM 800; 160 MG/1; MG/1
1 TABLET ORAL ONCE
Status: COMPLETED | OUTPATIENT
Start: 2020-05-31 | End: 2020-05-31

## 2020-05-31 RX ORDER — NICOTINE 21 MG/24HR
1 PATCH, TRANSDERMAL 24 HOURS TRANSDERMAL DAILY
Status: DISCONTINUED | OUTPATIENT
Start: 2020-05-31 | End: 2020-06-03 | Stop reason: HOSPADM

## 2020-05-31 RX ADMIN — SERTRALINE HYDROCHLORIDE 25 MG: 25 TABLET ORAL at 17:57

## 2020-05-31 RX ADMIN — ENOXAPARIN SODIUM 30 MG: 30 INJECTION SUBCUTANEOUS at 17:57

## 2020-05-31 RX ADMIN — DONEPEZIL HYDROCHLORIDE 10 MG: 5 TABLET ORAL at 17:56

## 2020-05-31 RX ADMIN — SULFAMETHOXAZOLE AND TRIMETHOPRIM 1 TABLET: 800; 160 TABLET ORAL at 14:36

## 2020-05-31 RX ADMIN — DOXYCYCLINE 100 MG: 100 INJECTION, POWDER, LYOPHILIZED, FOR SOLUTION INTRAVENOUS at 19:06

## 2020-05-31 RX ADMIN — AMLODIPINE BESYLATE 5 MG: 5 TABLET ORAL at 17:57

## 2020-05-31 RX ADMIN — ASPIRIN 81 MG: 81 TABLET ORAL at 17:56

## 2020-05-31 RX ADMIN — NICOTINE 1 PATCH: 14 PATCH TRANSDERMAL at 17:57

## 2020-05-31 RX ADMIN — DOCUSATE SODIUM 100 MG: 100 CAPSULE, LIQUID FILLED ORAL at 17:57

## 2020-06-01 ENCOUNTER — APPOINTMENT (INPATIENT)
Dept: MRI IMAGING | Facility: HOSPITAL | Age: 85
DRG: 872 | End: 2020-06-01
Payer: MEDICARE

## 2020-06-01 ENCOUNTER — TRANSITIONAL CARE MANAGEMENT (OUTPATIENT)
Dept: FAMILY MEDICINE CLINIC | Facility: MEDICAL CENTER | Age: 85
End: 2020-06-01

## 2020-06-01 ENCOUNTER — APPOINTMENT (INPATIENT)
Dept: CT IMAGING | Facility: HOSPITAL | Age: 85
DRG: 872 | End: 2020-06-01
Payer: MEDICARE

## 2020-06-01 PROBLEM — J43.9 EMPHYSEMA LUNG (HCC): Status: ACTIVE | Noted: 2020-06-01

## 2020-06-01 LAB — PROCALCITONIN SERPL-MCNC: 0.09 NG/ML

## 2020-06-01 PROCEDURE — 92610 EVALUATE SWALLOWING FUNCTION: CPT

## 2020-06-01 PROCEDURE — 71250 CT THORAX DX C-: CPT

## 2020-06-01 PROCEDURE — 70551 MRI BRAIN STEM W/O DYE: CPT

## 2020-06-01 PROCEDURE — 94664 DEMO&/EVAL PT USE INHALER: CPT

## 2020-06-01 PROCEDURE — 99232 SBSQ HOSP IP/OBS MODERATE 35: CPT | Performed by: NURSE PRACTITIONER

## 2020-06-01 RX ORDER — LORAZEPAM 2 MG/ML
0.5 INJECTION INTRAMUSCULAR ONCE
Status: DISCONTINUED | OUTPATIENT
Start: 2020-06-01 | End: 2020-06-02

## 2020-06-01 RX ORDER — DOXYCYCLINE HYCLATE 100 MG/1
100 CAPSULE ORAL EVERY 12 HOURS SCHEDULED
Status: DISCONTINUED | OUTPATIENT
Start: 2020-06-01 | End: 2020-06-03 | Stop reason: HOSPADM

## 2020-06-01 RX ORDER — GUAIFENESIN 600 MG
600 TABLET, EXTENDED RELEASE 12 HR ORAL EVERY 12 HOURS SCHEDULED
Status: DISCONTINUED | OUTPATIENT
Start: 2020-06-01 | End: 2020-06-03 | Stop reason: HOSPADM

## 2020-06-01 RX ORDER — ALBUTEROL SULFATE 2.5 MG/3ML
2.5 SOLUTION RESPIRATORY (INHALATION) EVERY 4 HOURS PRN
Status: DISCONTINUED | OUTPATIENT
Start: 2020-06-01 | End: 2020-06-03 | Stop reason: HOSPADM

## 2020-06-01 RX ORDER — GUAIFENESIN/DEXTROMETHORPHAN 100-10MG/5
10 SYRUP ORAL EVERY 4 HOURS PRN
Status: DISCONTINUED | OUTPATIENT
Start: 2020-06-01 | End: 2020-06-03 | Stop reason: HOSPADM

## 2020-06-01 RX ADMIN — DONEPEZIL HYDROCHLORIDE 10 MG: 5 TABLET ORAL at 08:17

## 2020-06-01 RX ADMIN — DOXYCYCLINE 100 MG: 100 CAPSULE ORAL at 20:43

## 2020-06-01 RX ADMIN — DOXYCYCLINE 100 MG: 100 INJECTION, POWDER, LYOPHILIZED, FOR SOLUTION INTRAVENOUS at 08:17

## 2020-06-01 RX ADMIN — NICOTINE 1 PATCH: 14 PATCH TRANSDERMAL at 08:16

## 2020-06-01 RX ADMIN — ASPIRIN 81 MG: 81 TABLET ORAL at 08:17

## 2020-06-01 RX ADMIN — SERTRALINE HYDROCHLORIDE 25 MG: 25 TABLET ORAL at 08:16

## 2020-06-01 RX ADMIN — AMLODIPINE BESYLATE 5 MG: 5 TABLET ORAL at 08:16

## 2020-06-01 RX ADMIN — ENOXAPARIN SODIUM 30 MG: 30 INJECTION SUBCUTANEOUS at 08:16

## 2020-06-01 RX ADMIN — GUAIFENESIN 600 MG: 600 TABLET ORAL at 20:43

## 2020-06-02 PROBLEM — J47.1 BRONCHIECTASIS WITH ACUTE EXACERBATION (HCC): Status: ACTIVE | Noted: 2020-05-31

## 2020-06-02 PROBLEM — E44.1 MILD PROTEIN-CALORIE MALNUTRITION (HCC): Status: ACTIVE | Noted: 2020-06-02

## 2020-06-02 PROBLEM — H54.61 VISION LOSS, RIGHT EYE: Status: ACTIVE | Noted: 2020-06-02

## 2020-06-02 PROBLEM — T68.XXXA HYPOTHERMIA: Status: RESOLVED | Noted: 2020-05-31 | Resolved: 2020-06-02

## 2020-06-02 LAB
ANION GAP SERPL CALCULATED.3IONS-SCNC: 10 MMOL/L (ref 4–13)
BASOPHILS # BLD AUTO: 0.05 THOUSANDS/ΜL (ref 0–0.1)
BASOPHILS NFR BLD AUTO: 1 % (ref 0–1)
BUN SERPL-MCNC: 25 MG/DL (ref 5–25)
CALCIUM SERPL-MCNC: 8.3 MG/DL (ref 8.3–10.1)
CHLORIDE SERPL-SCNC: 108 MMOL/L (ref 100–108)
CO2 SERPL-SCNC: 24 MMOL/L (ref 21–32)
CREAT SERPL-MCNC: 0.96 MG/DL (ref 0.6–1.3)
EOSINOPHIL # BLD AUTO: 0.23 THOUSAND/ΜL (ref 0–0.61)
EOSINOPHIL NFR BLD AUTO: 2 % (ref 0–6)
ERYTHROCYTE [DISTWIDTH] IN BLOOD BY AUTOMATED COUNT: 15.8 % (ref 11.6–15.1)
GFR SERPL CREATININE-BSD FRML MDRD: 52 ML/MIN/1.73SQ M
GLUCOSE SERPL-MCNC: 83 MG/DL (ref 65–140)
HCT VFR BLD AUTO: 33 % (ref 34.8–46.1)
HGB BLD-MCNC: 9.8 G/DL (ref 11.5–15.4)
IMM GRANULOCYTES # BLD AUTO: 0.09 THOUSAND/UL (ref 0–0.2)
IMM GRANULOCYTES NFR BLD AUTO: 1 % (ref 0–2)
LYMPHOCYTES # BLD AUTO: 1.54 THOUSANDS/ΜL (ref 0.6–4.47)
LYMPHOCYTES NFR BLD AUTO: 15 % (ref 14–44)
MAGNESIUM SERPL-MCNC: 1.9 MG/DL (ref 1.6–2.6)
MCH RBC QN AUTO: 27.1 PG (ref 26.8–34.3)
MCHC RBC AUTO-ENTMCNC: 29.7 G/DL (ref 31.4–37.4)
MCV RBC AUTO: 91 FL (ref 82–98)
MONOCYTES # BLD AUTO: 0.76 THOUSAND/ΜL (ref 0.17–1.22)
MONOCYTES NFR BLD AUTO: 7 % (ref 4–12)
NEUTROPHILS # BLD AUTO: 7.87 THOUSANDS/ΜL (ref 1.85–7.62)
NEUTS SEG NFR BLD AUTO: 74 % (ref 43–75)
NRBC BLD AUTO-RTO: 0 /100 WBCS
PHOSPHATE SERPL-MCNC: 3.3 MG/DL (ref 2.3–4.1)
PLATELET # BLD AUTO: 374 THOUSANDS/UL (ref 149–390)
PMV BLD AUTO: 9.1 FL (ref 8.9–12.7)
POTASSIUM SERPL-SCNC: 4.7 MMOL/L (ref 3.5–5.3)
RBC # BLD AUTO: 3.62 MILLION/UL (ref 3.81–5.12)
SODIUM SERPL-SCNC: 142 MMOL/L (ref 136–145)
WBC # BLD AUTO: 10.54 THOUSAND/UL (ref 4.31–10.16)

## 2020-06-02 PROCEDURE — 84100 ASSAY OF PHOSPHORUS: CPT | Performed by: NURSE PRACTITIONER

## 2020-06-02 PROCEDURE — 80048 BASIC METABOLIC PNL TOTAL CA: CPT | Performed by: FAMILY MEDICINE

## 2020-06-02 PROCEDURE — 83735 ASSAY OF MAGNESIUM: CPT | Performed by: NURSE PRACTITIONER

## 2020-06-02 PROCEDURE — 97167 OT EVAL HIGH COMPLEX 60 MIN: CPT

## 2020-06-02 PROCEDURE — 97163 PT EVAL HIGH COMPLEX 45 MIN: CPT

## 2020-06-02 PROCEDURE — 85025 COMPLETE CBC W/AUTO DIFF WBC: CPT | Performed by: FAMILY MEDICINE

## 2020-06-02 PROCEDURE — 99232 SBSQ HOSP IP/OBS MODERATE 35: CPT | Performed by: NURSE PRACTITIONER

## 2020-06-02 RX ORDER — SODIUM CHLORIDE, SODIUM GLUCONATE, SODIUM ACETATE, POTASSIUM CHLORIDE, MAGNESIUM CHLORIDE, SODIUM PHOSPHATE, DIBASIC, AND POTASSIUM PHOSPHATE .53; .5; .37; .037; .03; .012; .00082 G/100ML; G/100ML; G/100ML; G/100ML; G/100ML; G/100ML; G/100ML
50 INJECTION, SOLUTION INTRAVENOUS CONTINUOUS
Status: DISCONTINUED | OUTPATIENT
Start: 2020-06-02 | End: 2020-06-03 | Stop reason: HOSPADM

## 2020-06-02 RX ADMIN — DONEPEZIL HYDROCHLORIDE 10 MG: 5 TABLET ORAL at 08:17

## 2020-06-02 RX ADMIN — AMLODIPINE BESYLATE 5 MG: 5 TABLET ORAL at 08:17

## 2020-06-02 RX ADMIN — SODIUM CHLORIDE 250 ML: 0.9 INJECTION, SOLUTION INTRAVENOUS at 08:28

## 2020-06-02 RX ADMIN — ASPIRIN 81 MG: 81 TABLET ORAL at 08:17

## 2020-06-02 RX ADMIN — ENOXAPARIN SODIUM 30 MG: 30 INJECTION SUBCUTANEOUS at 08:17

## 2020-06-02 RX ADMIN — GUAIFENESIN 600 MG: 600 TABLET ORAL at 08:17

## 2020-06-02 RX ADMIN — DOXYCYCLINE 100 MG: 100 CAPSULE ORAL at 08:17

## 2020-06-02 RX ADMIN — NICOTINE 1 PATCH: 14 PATCH TRANSDERMAL at 08:18

## 2020-06-02 RX ADMIN — DOXYCYCLINE 100 MG: 100 CAPSULE ORAL at 21:33

## 2020-06-02 RX ADMIN — SERTRALINE HYDROCHLORIDE 25 MG: 25 TABLET ORAL at 08:17

## 2020-06-02 RX ADMIN — SODIUM CHLORIDE, SODIUM GLUCONATE, SODIUM ACETATE, POTASSIUM CHLORIDE, MAGNESIUM CHLORIDE, SODIUM PHOSPHATE, DIBASIC, AND POTASSIUM PHOSPHATE 50 ML/HR: .53; .5; .37; .037; .03; .012; .00082 INJECTION, SOLUTION INTRAVENOUS at 13:02

## 2020-06-02 RX ADMIN — GUAIFENESIN 600 MG: 600 TABLET ORAL at 21:33

## 2020-06-03 VITALS
TEMPERATURE: 98.4 F | DIASTOLIC BLOOD PRESSURE: 50 MMHG | SYSTOLIC BLOOD PRESSURE: 125 MMHG | HEART RATE: 64 BPM | HEIGHT: 62 IN | OXYGEN SATURATION: 96 % | RESPIRATION RATE: 18 BRPM | BODY MASS INDEX: 17 KG/M2 | WEIGHT: 92.37 LBS

## 2020-06-03 PROBLEM — J84.10 PULMONARY FIBROSIS (HCC): Status: ACTIVE | Noted: 2020-06-03

## 2020-06-03 LAB
ALBUMIN SERPL BCP-MCNC: 2.3 G/DL (ref 3.5–5)
ALP SERPL-CCNC: 47 U/L (ref 46–116)
ALT SERPL W P-5'-P-CCNC: 12 U/L (ref 12–78)
ANION GAP SERPL CALCULATED.3IONS-SCNC: 6 MMOL/L (ref 4–13)
AST SERPL W P-5'-P-CCNC: 12 U/L (ref 5–45)
BASOPHILS # BLD AUTO: 0.05 THOUSANDS/ΜL (ref 0–0.1)
BASOPHILS NFR BLD AUTO: 1 % (ref 0–1)
BILIRUB SERPL-MCNC: 0.2 MG/DL (ref 0.2–1)
BUN SERPL-MCNC: 24 MG/DL (ref 5–25)
CALCIUM SERPL-MCNC: 8.4 MG/DL (ref 8.3–10.1)
CHLORIDE SERPL-SCNC: 107 MMOL/L (ref 100–108)
CO2 SERPL-SCNC: 29 MMOL/L (ref 21–32)
CREAT SERPL-MCNC: 0.85 MG/DL (ref 0.6–1.3)
EOSINOPHIL # BLD AUTO: 0.29 THOUSAND/ΜL (ref 0–0.61)
EOSINOPHIL NFR BLD AUTO: 3 % (ref 0–6)
ERYTHROCYTE [DISTWIDTH] IN BLOOD BY AUTOMATED COUNT: 15.7 % (ref 11.6–15.1)
GFR SERPL CREATININE-BSD FRML MDRD: 61 ML/MIN/1.73SQ M
GLUCOSE SERPL-MCNC: 82 MG/DL (ref 65–140)
HCT VFR BLD AUTO: 34.2 % (ref 34.8–46.1)
HGB BLD-MCNC: 10.3 G/DL (ref 11.5–15.4)
IMM GRANULOCYTES # BLD AUTO: 0.06 THOUSAND/UL (ref 0–0.2)
IMM GRANULOCYTES NFR BLD AUTO: 1 % (ref 0–2)
LYMPHOCYTES # BLD AUTO: 1.57 THOUSANDS/ΜL (ref 0.6–4.47)
LYMPHOCYTES NFR BLD AUTO: 18 % (ref 14–44)
MAGNESIUM SERPL-MCNC: 1.9 MG/DL (ref 1.6–2.6)
MCH RBC QN AUTO: 27.2 PG (ref 26.8–34.3)
MCHC RBC AUTO-ENTMCNC: 30.1 G/DL (ref 31.4–37.4)
MCV RBC AUTO: 90 FL (ref 82–98)
MONOCYTES # BLD AUTO: 0.63 THOUSAND/ΜL (ref 0.17–1.22)
MONOCYTES NFR BLD AUTO: 7 % (ref 4–12)
NEUTROPHILS # BLD AUTO: 6.18 THOUSANDS/ΜL (ref 1.85–7.62)
NEUTS SEG NFR BLD AUTO: 70 % (ref 43–75)
NRBC BLD AUTO-RTO: 0 /100 WBCS
PHOSPHATE SERPL-MCNC: 3.8 MG/DL (ref 2.3–4.1)
PLATELET # BLD AUTO: 377 THOUSANDS/UL (ref 149–390)
PMV BLD AUTO: 9 FL (ref 8.9–12.7)
POTASSIUM SERPL-SCNC: 4.5 MMOL/L (ref 3.5–5.3)
PROCALCITONIN SERPL-MCNC: 0.07 NG/ML
PROT SERPL-MCNC: 6.3 G/DL (ref 6.4–8.2)
RBC # BLD AUTO: 3.79 MILLION/UL (ref 3.81–5.12)
SODIUM SERPL-SCNC: 142 MMOL/L (ref 136–145)
WBC # BLD AUTO: 8.78 THOUSAND/UL (ref 4.31–10.16)

## 2020-06-03 PROCEDURE — 85025 COMPLETE CBC W/AUTO DIFF WBC: CPT | Performed by: NURSE PRACTITIONER

## 2020-06-03 PROCEDURE — 84145 PROCALCITONIN (PCT): CPT | Performed by: NURSE PRACTITIONER

## 2020-06-03 PROCEDURE — 80053 COMPREHEN METABOLIC PANEL: CPT | Performed by: NURSE PRACTITIONER

## 2020-06-03 PROCEDURE — 99239 HOSP IP/OBS DSCHRG MGMT >30: CPT | Performed by: NURSE PRACTITIONER

## 2020-06-03 PROCEDURE — 83735 ASSAY OF MAGNESIUM: CPT | Performed by: NURSE PRACTITIONER

## 2020-06-03 PROCEDURE — 84100 ASSAY OF PHOSPHORUS: CPT | Performed by: NURSE PRACTITIONER

## 2020-06-03 RX ORDER — GUAIFENESIN/DEXTROMETHORPHAN 100-10MG/5
10 SYRUP ORAL EVERY 4 HOURS PRN
Qty: 118 ML | Refills: 0
Start: 2020-06-03

## 2020-06-03 RX ORDER — GUAIFENESIN 600 MG
600 TABLET, EXTENDED RELEASE 12 HR ORAL EVERY 12 HOURS SCHEDULED
Refills: 0
Start: 2020-06-03 | End: 2020-06-08

## 2020-06-03 RX ORDER — ALBUTEROL SULFATE 2.5 MG/3ML
2.5 SOLUTION RESPIRATORY (INHALATION) EVERY 4 HOURS PRN
Refills: 0
Start: 2020-06-03

## 2020-06-03 RX ADMIN — DONEPEZIL HYDROCHLORIDE 10 MG: 5 TABLET ORAL at 08:32

## 2020-06-03 RX ADMIN — DOXYCYCLINE 100 MG: 100 CAPSULE ORAL at 08:32

## 2020-06-03 RX ADMIN — ASPIRIN 81 MG: 81 TABLET ORAL at 08:32

## 2020-06-03 RX ADMIN — SERTRALINE HYDROCHLORIDE 25 MG: 25 TABLET ORAL at 08:32

## 2020-06-03 RX ADMIN — NICOTINE 1 PATCH: 14 PATCH TRANSDERMAL at 08:32

## 2020-06-03 RX ADMIN — AMLODIPINE BESYLATE 5 MG: 5 TABLET ORAL at 08:32

## 2020-06-03 RX ADMIN — GUAIFENESIN 600 MG: 600 TABLET ORAL at 08:32

## 2020-06-03 RX ADMIN — ENOXAPARIN SODIUM 30 MG: 30 INJECTION SUBCUTANEOUS at 08:32

## 2020-06-04 ENCOUNTER — TRANSITIONAL CARE MANAGEMENT (OUTPATIENT)
Dept: FAMILY MEDICINE CLINIC | Facility: MEDICAL CENTER | Age: 85
End: 2020-06-04

## 2020-07-17 ENCOUNTER — TELEPHONE (OUTPATIENT)
Dept: FAMILY MEDICINE CLINIC | Facility: MEDICAL CENTER | Age: 85
End: 2020-07-17

## 2020-07-17 NOTE — TELEPHONE ENCOUNTER
Dr Chris Mathews left a sticky note on her paperwork that Jonathan Peres is overdue for an appt, can someone reach out to schedule this? Thanks

## 2020-07-28 ENCOUNTER — APPOINTMENT (EMERGENCY)
Dept: RADIOLOGY | Facility: HOSPITAL | Age: 85
DRG: 682 | End: 2020-07-28
Payer: MEDICARE

## 2020-07-28 ENCOUNTER — HOSPITAL ENCOUNTER (INPATIENT)
Facility: HOSPITAL | Age: 85
LOS: 2 days | Discharge: NURSING, REHAB OR LONG-TERM CARE | DRG: 682 | End: 2020-07-30
Attending: EMERGENCY MEDICINE | Admitting: STUDENT IN AN ORGANIZED HEALTH CARE EDUCATION/TRAINING PROGRAM
Payer: MEDICARE

## 2020-07-28 DIAGNOSIS — E86.0 DEHYDRATION: Primary | ICD-10-CM

## 2020-07-28 DIAGNOSIS — F02.80 DEMENTIA ASSOCIATED WITH OTHER UNDERLYING DISEASE WITHOUT BEHAVIORAL DISTURBANCE (HCC): ICD-10-CM

## 2020-07-28 PROBLEM — F03.90 ADVANCED DEMENTIA (HCC): Status: ACTIVE | Noted: 2020-07-28

## 2020-07-28 PROBLEM — R62.7 FAILURE TO THRIVE IN ADULT: Status: ACTIVE | Noted: 2020-07-28

## 2020-07-28 LAB
ALBUMIN SERPL BCP-MCNC: 3.4 G/DL (ref 3.5–5)
ALP SERPL-CCNC: 59 U/L (ref 46–116)
ALT SERPL W P-5'-P-CCNC: 18 U/L (ref 12–78)
ANION GAP SERPL CALCULATED.3IONS-SCNC: 20 MMOL/L (ref 4–13)
APTT PPP: 26 SECONDS (ref 23–37)
AST SERPL W P-5'-P-CCNC: 34 U/L (ref 5–45)
ATRIAL RATE: 75 BPM
BACTERIA UR QL AUTO: ABNORMAL /HPF
BASOPHILS # BLD AUTO: 0.07 THOUSANDS/ΜL (ref 0–0.1)
BASOPHILS NFR BLD AUTO: 0 % (ref 0–1)
BILIRUB SERPL-MCNC: 0.5 MG/DL (ref 0.2–1)
BILIRUB UR QL STRIP: ABNORMAL
BUN SERPL-MCNC: 28 MG/DL (ref 5–25)
CALCIUM SERPL-MCNC: 8.7 MG/DL (ref 8.3–10.1)
CHLORIDE SERPL-SCNC: 103 MMOL/L (ref 100–108)
CLARITY UR: CLEAR
CO2 SERPL-SCNC: 21 MMOL/L (ref 21–32)
COLOR UR: YELLOW
CREAT SERPL-MCNC: 1.64 MG/DL (ref 0.6–1.3)
EOSINOPHIL # BLD AUTO: 0.01 THOUSAND/ΜL (ref 0–0.61)
EOSINOPHIL NFR BLD AUTO: 0 % (ref 0–6)
ERYTHROCYTE [DISTWIDTH] IN BLOOD BY AUTOMATED COUNT: 17 % (ref 11.6–15.1)
GFR SERPL CREATININE-BSD FRML MDRD: 27 ML/MIN/1.73SQ M
GLUCOSE SERPL-MCNC: 111 MG/DL (ref 65–140)
GLUCOSE UR STRIP-MCNC: NEGATIVE MG/DL
HCT VFR BLD AUTO: 40.4 % (ref 34.8–46.1)
HGB BLD-MCNC: 11.6 G/DL (ref 11.5–15.4)
HGB UR QL STRIP.AUTO: NEGATIVE
HYALINE CASTS #/AREA URNS LPF: ABNORMAL /LPF
IMM GRANULOCYTES # BLD AUTO: 0.13 THOUSAND/UL (ref 0–0.2)
IMM GRANULOCYTES NFR BLD AUTO: 1 % (ref 0–2)
INR PPP: 1.1 (ref 0.84–1.19)
KETONES UR STRIP-MCNC: ABNORMAL MG/DL
LACTATE SERPL-SCNC: 2.2 MMOL/L (ref 0.5–2)
LACTATE SERPL-SCNC: 2.9 MMOL/L (ref 0.5–2)
LACTATE SERPL-SCNC: 3 MMOL/L (ref 0.5–2)
LEUKOCYTE ESTERASE UR QL STRIP: ABNORMAL
LYMPHOCYTES # BLD AUTO: 0.54 THOUSANDS/ΜL (ref 0.6–4.47)
LYMPHOCYTES NFR BLD AUTO: 3 % (ref 14–44)
MCH RBC QN AUTO: 26.2 PG (ref 26.8–34.3)
MCHC RBC AUTO-ENTMCNC: 28.7 G/DL (ref 31.4–37.4)
MCV RBC AUTO: 91 FL (ref 82–98)
MONOCYTES # BLD AUTO: 0.74 THOUSAND/ΜL (ref 0.17–1.22)
MONOCYTES NFR BLD AUTO: 4 % (ref 4–12)
NEUTROPHILS # BLD AUTO: 16.37 THOUSANDS/ΜL (ref 1.85–7.62)
NEUTS SEG NFR BLD AUTO: 92 % (ref 43–75)
NITRITE UR QL STRIP: NEGATIVE
NON-SQ EPI CELLS URNS QL MICRO: ABNORMAL /HPF
NRBC BLD AUTO-RTO: 0 /100 WBCS
P AXIS: 74 DEGREES
PH UR STRIP.AUTO: 6 [PH]
PLATELET # BLD AUTO: 354 THOUSANDS/UL (ref 149–390)
PMV BLD AUTO: 9.2 FL (ref 8.9–12.7)
POTASSIUM SERPL-SCNC: 4.5 MMOL/L (ref 3.5–5.3)
PR INTERVAL: 148 MS
PROCALCITONIN SERPL-MCNC: <0.05 NG/ML
PROT SERPL-MCNC: 7.9 G/DL (ref 6.4–8.2)
PROT UR STRIP-MCNC: ABNORMAL MG/DL
PROTHROMBIN TIME: 13.7 SECONDS (ref 11.6–14.5)
QRS AXIS: -25 DEGREES
QRSD INTERVAL: 94 MS
QT INTERVAL: 434 MS
QTC INTERVAL: 484 MS
RBC # BLD AUTO: 4.43 MILLION/UL (ref 3.81–5.12)
RBC #/AREA URNS AUTO: ABNORMAL /HPF
SARS-COV-2 RNA RESP QL NAA+PROBE: NEGATIVE
SODIUM SERPL-SCNC: 144 MMOL/L (ref 136–145)
SP GR UR STRIP.AUTO: >=1.03 (ref 1–1.03)
T WAVE AXIS: 74 DEGREES
UROBILINOGEN UR QL STRIP.AUTO: 0.2 E.U./DL
VENTRICULAR RATE: 75 BPM
WBC # BLD AUTO: 17.86 THOUSAND/UL (ref 4.31–10.16)
WBC #/AREA URNS AUTO: ABNORMAL /HPF

## 2020-07-28 PROCEDURE — 99285 EMERGENCY DEPT VISIT HI MDM: CPT

## 2020-07-28 PROCEDURE — 80053 COMPREHEN METABOLIC PANEL: CPT | Performed by: EMERGENCY MEDICINE

## 2020-07-28 PROCEDURE — 93010 ELECTROCARDIOGRAM REPORT: CPT | Performed by: INTERNAL MEDICINE

## 2020-07-28 PROCEDURE — 99233 SBSQ HOSP IP/OBS HIGH 50: CPT | Performed by: STUDENT IN AN ORGANIZED HEALTH CARE EDUCATION/TRAINING PROGRAM

## 2020-07-28 PROCEDURE — 85025 COMPLETE CBC W/AUTO DIFF WBC: CPT | Performed by: EMERGENCY MEDICINE

## 2020-07-28 PROCEDURE — 36415 COLL VENOUS BLD VENIPUNCTURE: CPT | Performed by: EMERGENCY MEDICINE

## 2020-07-28 PROCEDURE — 87040 BLOOD CULTURE FOR BACTERIA: CPT | Performed by: EMERGENCY MEDICINE

## 2020-07-28 PROCEDURE — 93005 ELECTROCARDIOGRAM TRACING: CPT

## 2020-07-28 PROCEDURE — 83605 ASSAY OF LACTIC ACID: CPT | Performed by: NURSE PRACTITIONER

## 2020-07-28 PROCEDURE — 99285 EMERGENCY DEPT VISIT HI MDM: CPT | Performed by: EMERGENCY MEDICINE

## 2020-07-28 PROCEDURE — 96360 HYDRATION IV INFUSION INIT: CPT

## 2020-07-28 PROCEDURE — 81001 URINALYSIS AUTO W/SCOPE: CPT | Performed by: EMERGENCY MEDICINE

## 2020-07-28 PROCEDURE — 83605 ASSAY OF LACTIC ACID: CPT | Performed by: EMERGENCY MEDICINE

## 2020-07-28 PROCEDURE — 85610 PROTHROMBIN TIME: CPT | Performed by: EMERGENCY MEDICINE

## 2020-07-28 PROCEDURE — 87635 SARS-COV-2 COVID-19 AMP PRB: CPT | Performed by: EMERGENCY MEDICINE

## 2020-07-28 PROCEDURE — 84145 PROCALCITONIN (PCT): CPT | Performed by: EMERGENCY MEDICINE

## 2020-07-28 PROCEDURE — 71045 X-RAY EXAM CHEST 1 VIEW: CPT

## 2020-07-28 PROCEDURE — 85730 THROMBOPLASTIN TIME PARTIAL: CPT | Performed by: EMERGENCY MEDICINE

## 2020-07-28 RX ORDER — AMLODIPINE BESYLATE 5 MG/1
5 TABLET ORAL DAILY
Status: DISCONTINUED | OUTPATIENT
Start: 2020-07-29 | End: 2020-07-30 | Stop reason: HOSPADM

## 2020-07-28 RX ORDER — SERTRALINE HYDROCHLORIDE 25 MG/1
25 TABLET, FILM COATED ORAL DAILY
Status: DISCONTINUED | OUTPATIENT
Start: 2020-07-29 | End: 2020-07-30 | Stop reason: HOSPADM

## 2020-07-28 RX ORDER — DOCUSATE SODIUM 100 MG/1
100 CAPSULE, LIQUID FILLED ORAL 2 TIMES DAILY
Status: DISCONTINUED | OUTPATIENT
Start: 2020-07-28 | End: 2020-07-30 | Stop reason: HOSPADM

## 2020-07-28 RX ORDER — CALCIUM CARBONATE 200(500)MG
1000 TABLET,CHEWABLE ORAL DAILY PRN
Status: DISCONTINUED | OUTPATIENT
Start: 2020-07-28 | End: 2020-07-30 | Stop reason: HOSPADM

## 2020-07-28 RX ORDER — ONDANSETRON 2 MG/ML
4 INJECTION INTRAMUSCULAR; INTRAVENOUS EVERY 6 HOURS PRN
Status: DISCONTINUED | OUTPATIENT
Start: 2020-07-28 | End: 2020-07-30 | Stop reason: HOSPADM

## 2020-07-28 RX ORDER — HEPARIN SODIUM 5000 [USP'U]/ML
5000 INJECTION, SOLUTION INTRAVENOUS; SUBCUTANEOUS EVERY 8 HOURS SCHEDULED
Status: DISCONTINUED | OUTPATIENT
Start: 2020-07-28 | End: 2020-07-30 | Stop reason: HOSPADM

## 2020-07-28 RX ORDER — ACETAMINOPHEN 325 MG/1
650 TABLET ORAL EVERY 6 HOURS PRN
Status: DISCONTINUED | OUTPATIENT
Start: 2020-07-28 | End: 2020-07-30 | Stop reason: HOSPADM

## 2020-07-28 RX ORDER — ASPIRIN 81 MG/1
81 TABLET ORAL DAILY
Status: DISCONTINUED | OUTPATIENT
Start: 2020-07-29 | End: 2020-07-30 | Stop reason: HOSPADM

## 2020-07-28 RX ORDER — NICOTINE 21 MG/24HR
1 PATCH, TRANSDERMAL 24 HOURS TRANSDERMAL DAILY
Status: DISCONTINUED | OUTPATIENT
Start: 2020-07-29 | End: 2020-07-30 | Stop reason: HOSPADM

## 2020-07-28 RX ORDER — DONEPEZIL HYDROCHLORIDE 5 MG/1
10 TABLET, FILM COATED ORAL DAILY
Status: DISCONTINUED | OUTPATIENT
Start: 2020-07-29 | End: 2020-07-30 | Stop reason: HOSPADM

## 2020-07-28 RX ORDER — SENNOSIDES 8.6 MG
1 TABLET ORAL DAILY
Status: DISCONTINUED | OUTPATIENT
Start: 2020-07-29 | End: 2020-07-30 | Stop reason: HOSPADM

## 2020-07-28 RX ADMIN — DOCUSATE SODIUM 100 MG: 100 CAPSULE, LIQUID FILLED ORAL at 18:47

## 2020-07-28 RX ADMIN — SODIUM CHLORIDE 250 ML: 0.9 INJECTION, SOLUTION INTRAVENOUS at 23:36

## 2020-07-28 RX ADMIN — HEPARIN SODIUM 5000 UNITS: 5000 INJECTION INTRAVENOUS; SUBCUTANEOUS at 18:47

## 2020-07-28 RX ADMIN — SODIUM CHLORIDE 250 ML: 0.9 INJECTION, SOLUTION INTRAVENOUS at 13:01

## 2020-07-28 NOTE — ASSESSMENT & PLAN NOTE
Malnutrition Findings:           BMI Findings: Body mass index is 15 28 kg/m²       See plan for failure to thrive

## 2020-07-28 NOTE — ED PROVIDER NOTES
History  Chief Complaint   Patient presents with    Loss of Appetite     hasnt eaten in 3 days as per family, home appears to be dissheveled as per ems, dementia hx      HPI patient is a 49-year-old female, EMS reports recently discharged from [facility], review records show the patient was admitted here in May  Apparently patient has not been eating well over the last 3 days with EMS reports they received history from the family  The patient has history of dementia reports that she felt a little nauseous so she thinks she might not have eaten is well as she normally does but reports no symptoms now  Patient reports that she feels fine  Patient denies any current symptoms  Denies any vomiting or diarrhea  She denies any pain  Denies any difficulty breathing  EMS reports that the house was very unclean, they report cat feces on the floor  They report the patient was not in a clean environment  They were concerned the patient should not return there  Past medical history pulmonary fibrosis from old records, hypertension, tuberculosis  Family history noncontributory  Social history, smoker, denies drug abuse    Prior to Admission Medications   Prescriptions Last Dose Informant Patient Reported? Taking?    albuterol (2 5 mg/3 mL) 0 083 % nebulizer solution   No No   Sig: Take 1 vial (2 5 mg total) by nebulization every 4 (four) hours as needed for wheezing or shortness of breath   amLODIPine (NORVASC) 5 mg tablet  Self Yes No   Sig: Take 5 mg by mouth daily   aspirin (ECOTRIN LOW STRENGTH) 81 mg EC tablet   No No   Sig: Take 1 tablet (81 mg total) by mouth daily   dextromethorphan-guaiFENesin (ROBITUSSIN DM)  mg/5 mL syrup   No No   Sig: Take 10 mL by mouth every 4 (four) hours as needed for cough   docusate sodium (COLACE) 100 mg capsule   No No   Sig: Take 1 capsule (100 mg total) by mouth 2 (two) times a day as needed for constipation   donepezil (ARICEPT) 10 mg tablet  Self Yes No   Sig: Take 10 mg by mouth daily   nicotine (NICODERM CQ) 14 mg/24hr TD 24 hr patch   No No   Sig: Place 1 patch on the skin daily   sertraline (ZOLOFT) 25 mg tablet  Self Yes No   Sig: Take 25 mg by mouth daily        Facility-Administered Medications: None       Past Medical History:   Diagnosis Date    Current smoker     Dementia ([facility] [address] )     History of tuberculosis 40 years ago treated     Hypertension     Ovarian cyst        Past Surgical History:   Procedure Laterality Date    APPENDECTOMY      CATARACT EXTRACTION      COLON SURGERY Right     COLONOSCOPY      COLONOSCOPY N/A 10/5/2017    Procedure: COLONOSCOPY;  Surgeon: Herminia Gonsalez MD;  Location: [facility]; Service: Gastroenterology    HYSTERECTOMY      OVARIAN CYST REMOVAL         Family History   Problem Relation Age of Onset    No Known Problems Mother     Hypertension Father      I have reviewed and agree with the history as documented  E-Cigarette/Vaping     E-Cigarette/Vaping Substances     Social History     Tobacco Use    Smoking status: Current Every Day Smoker     Packs/day: 1 00     Types: Cigarettes    Smokeless tobacco: Current User   Substance Use Topics    Alcohol use: Not Currently     Frequency: Never    Drug use: No       Review of Systems   Constitutional: Negative for diaphoresis, fatigue and fever  HENT: Negative for congestion, ear pain, nosebleeds and sore throat  Eyes: Negative for photophobia, pain, discharge and visual disturbance  Respiratory: Negative for cough, choking, chest tightness, shortness of breath and wheezing  Cardiovascular: Negative for chest pain and palpitations  Gastrointestinal: Negative for abdominal distention, abdominal pain, diarrhea and vomiting  Genitourinary: Negative for dysuria, flank pain and frequency  Musculoskeletal: Negative for back pain, gait problem and joint swelling  Skin: Negative for color change and rash  Neurological: Negative for dizziness, syncope and headaches  Psychiatric/Behavioral: Negative for behavioral problems and confusion  The patient is not nervous/anxious  All other systems reviewed and are negative  Daughter reports not eating    Physical Exam  Physical Exam   Constitutional: She appears well-developed and well-nourished  HENT:   Head: Normocephalic  Right Ear: External ear normal    Left Ear: External ear normal    Nose: Nose normal    Dry mucous membranes, poor skin turgor   Eyes: Pupils are equal, round, and reactive to light  EOM and lids are normal    Neck: Normal range of motion  Neck supple  Cardiovascular: Normal rate, regular rhythm, normal heart sounds and intact distal pulses  Pulmonary/Chest: Effort normal and breath sounds normal  No respiratory distress  Musculoskeletal: Normal range of motion  She exhibits no deformity  Neurological: She is alert  Oriented to person place but not time   Skin: Skin is warm and dry  Psychiatric: She has a normal mood and affect  Nursing note and vitals reviewed    Pulse oximetry 98% on room air adequate oxygenation there is no hypoxia    Vital Signs  ED Triage Vitals [07/28/20 1154]   Temperature Pulse Respirations Blood Pressure SpO2   97 8 °F (36 6 °C) 80 18 111/50 98 %      Temp Source Heart Rate Source Patient Position - Orthostatic VS BP Location FiO2 (%)   Axillary Monitor Lying Right arm --      Pain Score       --           Vitals:    07/28/20 1154 07/28/20 1315 07/28/20 1330   BP: 111/50 152/68 136/62   Pulse: 80 75 72   Patient Position - Orthostatic VS: Lying Lying          Visual Acuity  Visual Acuity      Most Recent Value   L Pupil Size (mm)  2   R Pupil Size (mm)  2   L Pupil Shape  Round   R Pupil Shape  Round          ED Medications  Medications   sodium chloride 0 9 % bolus 250 mL (0 mL Intravenous Stopped 7/28/20 1409)       Diagnostic Studies  Results Reviewed     Procedure Component Value Units Date/Time    Novel Coronavirus (Covid-19),PCR [facility] [[id number]]  (Normal) Collected:  07/28/20 1203    Lab Status:  Final result Specimen:  Nares from Nose Updated:  07/28/20 1314     SARS-CoV-2 Negative    Narrative: The specimen collection materials, transport medium, and/or testing methodology utilized in the production of these test results have been proven to be reliable in a limited validation with an abbreviated program under the Emergency Utilization Authorization provided by the FDA  Testing reported as "Presumptive positive" will be confirmed with secondary testing with a reference laboratory to ensure result accuracy  Clinical caution and judgement should be used with the interpretation of these results with consideration of the clinical impression and other laboratory testing  Testing reported as "Positive" or "Negative" has been proven to be accurate according to standard laboratory validation requirements  All testing is performed with control materials showing appropriate reactivity at standard intervals  Lactic acid [[id number]]  (Abnormal) Collected:  07/28/20 1213    Lab Status:  Final result Specimen:  Blood from Arm, Right Updated:  07/28/20 1248     LACTIC ACID 3 0 mmol/L     Narrative:       Result may be elevated if tourniquet was used during collection      Lactic acid 2 Hours [[id number]]     Lab Status:  No result Specimen:  Blood     Comprehensive metabolic panel [[id number]]  (Abnormal) Collected:  07/28/20 1213    Lab Status:  Final result Specimen:  Blood from Arm, Right Updated:  07/28/20 1241     Sodium 144 mmol/L      Potassium 4 5 mmol/L      Chloride 103 mmol/L      CO2 21 mmol/L      ANION GAP 20 mmol/L      BUN 28 mg/dL      Creatinine 1 64 mg/dL      Glucose 111 mg/dL      Calcium 8 7 mg/dL      AST 34 U/L      ALT 18 U/L      Alkaline Phosphatase 59 U/L      Total Protein 7 9 g/dL      Albumin 3 4 g/dL      Total Bilirubin 0 50 mg/dL      eGFR 27 ml/min/1 73sq m     Narrative:       [place] guidelines for Chronic Kidney Disease (CKD):     Stage 1 with normal or high GFR (GFR > 90 mL/min/1 73 square meters)    Stage 2 Mild CKD (GFR = 60-89 mL/min/1 73 square meters)    Stage 3A Moderate CKD (GFR = 45-59 mL/min/1 73 square meters)    Stage 3B Moderate CKD (GFR = 30-44 mL/min/1 73 square meters)    Stage 4 Severe CKD (GFR = 15-29 mL/min/1 73 square meters)    Stage 5 End Stage CKD (GFR <15 mL/min/1 73 square meters)  Note: GFR calculation is accurate only with a steady state creatinine    CBC and differential [[id number]]  (Abnormal) Collected:  07/28/20 1213    Lab Status:  Final result Specimen:  Blood from Arm, Right Updated:  07/28/20 1236     WBC 17 86 Thousand/uL      RBC 4 43 Million/uL      Hemoglobin 11 6 g/dL      Hematocrit 40 4 %      MCV 91 fL      MCH 26 2 pg      MCHC 28 7 g/dL      RDW 17 0 %      MPV 9 2 fL      Platelets 761 Thousands/uL      nRBC 0 /100 WBCs      Neutrophils Relative 92 %      Immat GRANS % 1 %      Lymphocytes Relative 3 %      Monocytes Relative 4 %      Eosinophils Relative 0 %      Basophils Relative 0 %      Neutrophils Absolute 16 37 Thousands/µL      Immature Grans Absolute 0 13 Thousand/uL      Lymphocytes Absolute 0 54 Thousands/µL      Monocytes Absolute 0 74 Thousand/µL      Eosinophils Absolute 0 01 Thousand/µL      Basophils Absolute 0 07 Thousands/µL     Protime-INR [[id number]]  (Normal) Collected:  07/28/20 1213    Lab Status:  Final result Specimen:  Blood from Arm, Right Updated:  07/28/20 1235     Protime 13 7 seconds      INR 1 10    APTT [[id number]]  (Normal) Collected:  07/28/20 1213    Lab Status:  Final result Specimen:  Blood from Arm, Right Updated:  07/28/20 1235     PTT 26 seconds     Blood culture #1 [[id number]] Collected:  07/28/20 1213    Lab Status: In process Specimen:  Blood from Arm, Right Updated:  07/28/20 1218    Procalcitonin [[id number]] Collected:  07/28/20 1213    Lab Status:   In process Specimen:  Blood from Arm, Right Updated:  07/28/20 1218 Blood culture #2 [[id number]]     Lab Status:  No result Specimen:  Blood     UA w Reflex to Microscopic w Reflex to Culture [[id number]]     Lab Status:  No result Specimen:  Urine                  XR chest 1 view portable   Final Result by Ryan Gonzales MD (07/28 2)      Stable areas of biapical and left basilar density correlating with areas of chronic fibrosis described on the CT chest from 6/1/2020  No new findings  Workstation performed: [id number]                    Procedures  ECG 12 Lead Documentation Only  Date/Time: 7/28/2020 12:29 PM  Performed by: Suzan Zapata MD  Authorized by: Suzan Zapata MD     Indications / Diagnosis:  Weakness  ECG reviewed by me, the ED Provider: yes    Patient location:  ED  Previous ECG:     Previous ECG:  Compared to current    Comparison ECG info: May 31, 2020    Similarity:  Changes noted  Interpretation:     Interpretation: non-specific    Rate:     ECG rate:  Seventy-five    ECG rate assessment: normal    Rhythm:     Rhythm: sinus rhythm    Comments:      Normal sinus rhythm, nonspecific ST-T wave changes no acute ST abnormalities             ED Course       US AUDIT      Most Recent Value   Initial Alcohol Screen: US AUDIT-C    1  How often do you have a drink containing alcohol?  0 Filed at: 07/28/2020 1151   2  How many drinks containing alcohol do you have on a typical day you are drinking? 0 Filed at: 07/28/2020 1151   3a  Male UNDER 65: How often do you have five or more drinks on one occasion? 0 Filed at: 07/28/2020 1151   3b  FEMALE Any Age, or MALE 65+: How often do you have 4 or more drinks on one occassion? 0 Filed at: 07/28/2020 1151   Audit-C Score  0 Filed at: 07/28/2020 1151                  JOSÉ ANTONIO/DAST-10      Most Recent Value   How many times in the past year have you    Used an illegal drug or used a prescription medication for non-medical reasons?   Never Filed at: 07/28/2020 1151             I spoke with the patient's daughter at home, called the number on the chart, apparently patient is getting more more difficult to deal with at home due to her progressive dementia but the daughter reports she is able to care for her  She reports over last 3 days the patient essentially stopped eating apparently had a bowl of cereal 3 days ago piece of toast 2 days ago nothing over the last 2 days  Labs are consistent with dehydration  No sign of sepsis or sepsis origin found  Patient will require IV fluids for dehydration and possible help for the family placement versus home care  Daughter does seem reasonable and wants to take care of her mother  EMS reports home in bad condition  Initial Sepsis Screening     Row Name 07/28/20 133                Is the patient's history suggestive of a new or worsening infection? No  -PF        Suspected source of infection          Are two or more of the following signs & symptoms of infection both present and new to the patient? No  -PF        Indicate SIRS criteria          If the answer is yes to both questions, suspicion of sepsis is present          If severe sepsis is present AND tissue hypoperfusion perists in the hour after fluid resuscitation or lactate > 4, the patient meets criteria for SEPTIC SHOCK          Are any of the following organ dysfunction criteria present within 6 hours of suspected infection and SIRS criteria that are NOT considered to be chronic conditions?         Organ dysfunction          Date of presentation of severe sepsis          Time of presentation of severe sepsis          Tissue hypoperfusion persists in the hour after crystalloid fluid administration, evidenced, by either:          Was hypotension present within one hour of the conclusion of crystalloid fluid administration?           Date of presentation of septic shock          Time of presentation of septic shock            User Key  (r) = Recorded By, (t) = Taken By, (c) = Cosigned By    [address] Name Provider Type     Owen Abdi MD Physician         Chest x-ray: Chest x-ray showed a normal cardiac silhouette, no pneumothorax chronic bilateral infiltrates consistent with pulmonary fibrosis, actually improved from prior chest x-ray  Lactate was elevated at 3 0 I believe consistent with patient's dehydration  Patient's electrolytes showed a BUN of 28 creatinine 1 64, creatinine is elevated from prior, possible KAREN versus primarily just dehydration  White count elevated, nonspecific finding, hemoglobin was normal 11 6 no sign of inflammation,  Awaiting urine and further diagnostic testing  At this point the patient at 80 will require IV hydration  MDM medical decision making 72-year-old female presents emergency department, history of not eating at home, daughter reports barely eating over the last few days, patient has a history of dementia  Patient really cannot give a history has no complaints when asked  Awake and alert here  Diagnostic testing shows no kojo sign of infection or other problem at this time  Discussed with hospital service will hydrate, hopefully the patient will respond and be able to return home with assistance or will require placement  Disposition  Final diagnoses:   Dehydration     Time reflects when diagnosis was documented in both MDM as applicable and the Disposition within this note     Time User Action Codes Description Comment    7/28/2020  1:36 PM Rashmi Mckoy Add [E86 0] Dehydration       ED Disposition     ED Disposition Condition Date/Time Comment    Admit Stable Tue Jul 28, 2020  1:37 PM Case was discussed with the hospitalist service and the patient's admission status was agreed to be 2 midnights the service of Dr Diego        Follow-up Information    None         Patient's Medications   Discharge Prescriptions    No medications on file     No discharge procedures on file      PDMP Review       Value Time User    PDMP Reviewed  Yes 3/19/2020  9:41 PM Pipe Mistry MD          ED Provider  Electronically Signed by           Obi Orta MD  07/28/20 0703

## 2020-07-28 NOTE — SEPSIS NOTE
Sepsis Note   Meredith Guerrero 80 y o  female MRN: [id number]  Unit/Bed#: FT 01 Encounter: [id number]      qSOFA     Row Name 07/28/20 1315 07/28/20 1154 07/28/20 1151          Altered mental status GCS < 15      0      Respiratory Rate > / =22  0  0        Systolic BP < / =390  0  0        Q Sofa Score  0  0            Initial Sepsis Screening     Row Name 07/28/20 1333                Is the patient's history suggestive of a new or worsening infection? No  -PF        Suspected source of infection          Are two or more of the following signs & symptoms of infection both present and new to the patient? No  -PF        Indicate SIRS criteria          If the answer is yes to both questions, suspicion of sepsis is present          If severe sepsis is present AND tissue hypoperfusion perists in the hour after fluid resuscitation or lactate > 4, the patient meets criteria for SEPTIC SHOCK          Are any of the following organ dysfunction criteria present within 6 hours of suspected infection and SIRS criteria that are NOT considered to be chronic conditions?         Organ dysfunction          Date of presentation of severe sepsis          Time of presentation of severe sepsis          Tissue hypoperfusion persists in the hour after crystalloid fluid administration, evidenced, by either:          Was hypotension present within one hour of the conclusion of crystalloid fluid administration?         Date of presentation of septic shock          Time of presentation of septic shock            User Key  (r) = Recorded By, (t) = Taken By, (c) = Cosigned By    [address] Name Provider Type    PF Electa Kehr, MD Physician        Sepsis workup due to age and poor history  No source found  Patient has a chest x-ray consistent with chronic pulmonary fibrosis actually improved from prior chest x-ray  Lactate is elevated secondary to dehydration  No indication for antibiotics at this time    Discussed with hospitalist service

## 2020-07-28 NOTE — ASSESSMENT & PLAN NOTE
Not eating, likely secondary to advanced dementia  Will consult Speech/swallow and palliative care  Level 1 at this time, needs goals of care discussion

## 2020-07-28 NOTE — SOCIAL WORK
Consult received for pt recently discharged from [address] [place] [address] and EMS reporting poor living conditions  CM met with Dr Steve Méndez in the ED and he reported that pt experiencing dehydration and will be admitted  He reported that he was able to speak with pt's daughter whom expressed that pt was at [address] [place] [address] for 3 weeks and then discharged home  Daughter reported that she takes care of pt  CM contacted pt's daughter Trinidad Marsh at [phone] and left vm  CM contacted Trinidad Marsh at her home phone, [phone]  CM spoke with her to discuss reported "poor living conditions " She reported that she believes that pt was transported by [facility] and they were not pleasant  She said that they were asking her questions about her orientation and she was trying to explain what was going on with pt  She also reported that they were asking if she gave pt any narcotics  She reported that she was not pleased about some of the questions that were being asked  She said that she has a dog that sheds a lot so maybe that is what the EMS saw  She became very emotional at this time  She stated that she gave her up life to take care of pt  She said that she has not worked in 4 months to take care of pt  She renetta storey pt was discharged from [address] [place] [address] and came home with [facility] for SN, PT and OT  She reported that the services from [facility] are good  She reported that pt's stay at [address] [place] [address] was "fabolous " Seh said that the staff were caring and actually interacted with pt  She stated that pt has been to [facility] but it was a "nightmare " She stated that pt was put in a wc and sat in the hallways all day  She said that if the plan is for her to go back to [facility], she would like her to go to [address] [place] [address]  She said that [place] is about 15-20 mins away from her  During this time, she said that pt lives with her and she assists with ADLs  She feeds her, baths her, washes her clothes, and cleans up after her   She said that pt only takes 3 medications at home  She said that she stop giving pt her lactulose because she has diarrhea everyday  She said that pt slept the majority of the day yesterday  She said that when she woke up, she was moaning over the toilet saying that she felt as if she had to throw up  She said that she called a friend to come over to help figure out what was going on with pt  She reported pt has not eaten in 4 days and typically has a good appetite for so many years  CM sent referral to [facility] to review  PT eval will be needed

## 2020-07-28 NOTE — H&P
H&P- Abhi Nolen [birth year], 80 y o  female MRN: [id number]    Unit/Bed#: FT 01 Encounter: [id number]    Primary Care Provider: Jose Luis Britton MD   Date and time admitted to hospital: 7/28/2020 11:48 AM       AKREN  -prerenal in setting of dehydration from poor PO intake, now improved with IV fluids        Advanced dementia ([facility] [address] )  Assessment & Plan  Continue home dose aricept     Mild protein-calorie malnutrition ([facility] [address] )  Assessment & Plan  Malnutrition Findings:           BMI Findings: Body mass index is 15 28 kg/m²  See plan for failure to thrive    Depression  Assessment & Plan  Continue home dose zoloft    Hypertension  Assessment & Plan  Normotensive, continue home dose amlodipine     Tobacco dependence  Assessment & Plan  Start nicotine patch     * Failure to thrive in adult  Assessment & Plan  Not eating, likely secondary to advanced dementia  Will consult Speech/swallow and palliative care  Level 1 at this time, needs goals of care discussion         VTE Prophylaxis: Heparin  / sequential compression device   Code Status: Full Code  POLST: POLST form is not discussed and not completed at this time  Discussion with family: Called daughter on cell phone, no answer    Anticipated Length of Stay:  Patient will be admitted on an Inpatient basis with an anticipated length of stay of 2 midnights  Justification for Hospital Stay: Failure to thrive     Total Time for Visit, including Counseling / Coordination of Care: 30 minutes  Greater than 50% of this total time spent on direct patient counseling and coordination of care  Chief Complaint:   Failure to thrive     History of Present Illness: Abhi Nolen is a 80 y o  female who presents with failure to thrive  Patient was recently discharged from [facility], after recent admission in May 2020 for sepsis secondary to UTI   Patient's daughter is reporting for the last 3 days she has not been eating at which point EMS was called for further evaluation  She was found to be disheveled with poor living conditions and brought in for further evaluation  Currently asymptomatic per reports, no lab abnormalities, complaints  Admitted or goals of care and dispo management  Past Medical and Surgical History:     Past Medical History:   Diagnosis Date    Current smoker     Dementia ([address] )     History of tuberculosis 36 years ago treated     Hypertension     Ovarian cyst        Past Surgical History:   Procedure Laterality Date    APPENDECTOMY      CATARACT EXTRACTION      COLON SURGERY Right     COLONOSCOPY      COLONOSCOPY N/A 10/5/2017    Procedure: COLONOSCOPY;  Surgeon: Lucy Iqbal MD;  Location: [facility]; Service: Gastroenterology    HYSTERECTOMY      OVARIAN CYST REMOVAL         Meds/Allergies:    Prior to Admission medications    Medication Sig Start Date End Date Taking?  Authorizing Provider   albuterol (2 5 mg/3 mL) 0 083 % nebulizer solution Take 1 vial (2 5 mg total) by nebulization every 4 (four) hours as needed for wheezing or shortness of breath 6/3/20   MARVEL Newby   amLODIPine (NORVASC) 5 mg tablet Take 5 mg by mouth daily    Historical Provider, MD   aspirin (ECOTRIN LOW STRENGTH) 81 mg EC tablet Take 1 tablet (81 mg total) by mouth daily 3/21/20 4/20/20  MARVEL Burr   dextromethorphan-guaiFENesin (ROBITUSSIN DM)  mg/5 mL syrup Take 10 mL by mouth every 4 (four) hours as needed for cough 6/3/20   MARVEL Newby   docusate sodium (COLACE) 100 mg capsule Take 1 capsule (100 mg total) by mouth 2 (two) times a day as needed for constipation 3/20/20   MARVEL Burr   donepezil (ARICEPT) 10 mg tablet Take 10 mg by mouth daily    Historical Provider, MD   nicotine (NICODERM CQ) 14 mg/24hr TD 24 hr patch Place 1 patch on the skin daily 11/24/19   Carol Valenzuela MD   sertraline (ZOLOFT) 25 mg tablet Take 25 mg by mouth daily      Historical Provider, MD     I have reviewed home medications with patient personally  Allergies: Allergies   Allergen Reactions    Penicillins Other (See Comments)     Unknown reaction       Social History:     Marital Status:    Occupation: na  Patient Pre-hospital Living Situation: na  Patient Pre-hospital Level of Mobility: na  Patient Pre-hospital Diet Restrictions: na  Substance Use History:   Social History     Substance and Sexual Activity   Alcohol Use Not Currently    Frequency: Never     Social History     Tobacco Use   Smoking Status Current Every Day Smoker    Packs/day: 1 00    Types: Cigarettes   Smokeless Tobacco Current User     Social History     Substance and Sexual Activity   Drug Use No       Family History:    non-contributory    Physical Exam:     Vitals:   Blood Pressure: 134/61 (07/28/20 1730)  Pulse: 75 (07/28/20 1730)  Temperature: 97 8 °F (36 6 °C) (07/28/20 1154)  Temp Source: Axillary (07/28/20 1154)  Respirations: 19 (07/28/20 1730)  Height: 5' (152 4 cm) (07/28/20 1154)  Weight - Scale: 35 5 kg (78 lb 4 2 oz) (07/28/20 1154)  SpO2: 95 % (07/28/20 1730)    Physical Exam   Constitutional:   Cachectic, malnourished   Cardiovascular: Normal rate and regular rhythm  Pulmonary/Chest: Effort normal  No respiratory distress  Abdominal: Soft  Bowel sounds are normal    Neurological: She is alert  Skin: Skin is warm and dry  Psychiatric: She has a normal mood and affect  Her behavior is normal          Additional Data:     Lab Results: I have personally reviewed pertinent reports        Results from last 7 days   Lab Units 07/28/20  1213   WBC Thousand/uL 17 86*   HEMOGLOBIN g/dL 11 6   HEMATOCRIT % 40 4   PLATELETS Thousands/uL 354   NEUTROS PCT % 92*   LYMPHS PCT % 3*   MONOS PCT % 4   EOS PCT % 0     Results from last 7 days   Lab Units 07/28/20  1213   SODIUM mmol/L 144   POTASSIUM mmol/L 4 5   CHLORIDE mmol/L 103   CO2 mmol/L 21   BUN mg/dL 28*   CREATININE mg/dL 1 64*   ANION GAP mmol/L 20*   CALCIUM mg/dL 8 7 ALBUMIN g/dL 3 4*   TOTAL BILIRUBIN mg/dL 0 50   ALK PHOS U/L 59   ALT U/L 18   AST U/L 34   GLUCOSE RANDOM mg/dL 111     Results from last 7 days   Lab Units 07/28/20  1213   INR  1 10             Results from last 7 days   Lab Units 07/28/20  1449 07/28/20  1213   LACTIC ACID mmol/L 2 2* 3 0*       Imaging: I have personally reviewed pertinent reports  XR chest 1 view portable   Final Result by Bessy Henry MD (07/28 1352)      Stable areas of biapical and left basilar density correlating with areas of chronic fibrosis described on the CT chest from 6/1/2020  No new findings  Workstation performed: [id number]             EKG, Pathology, and Other Studies Reviewed on Admission:   · EKG: Sinus rhythm    Allscripts / Epic Records Reviewed: Yes     ** Please Note: This note has been constructed using a voice recognition system   **

## 2020-07-28 NOTE — PLAN OF CARE
Problem: Potential for Falls  Goal: Patient will remain free of falls  Description  INTERVENTIONS:  - Assess patient frequently for physical needs  -  Identify cognitive and physical deficits and behaviors that affect risk of falls    -  [place] fall precautions as indicated by assessment   - Educate patient/family on patient safety including physical limitations  - Instruct patient to call for assistance with activity based on assessment  - Modify environment to reduce risk of injury  - Consider OT/PT consult to assist with strengthening/mobility  Outcome: Progressing

## 2020-07-29 LAB
ALBUMIN SERPL BCP-MCNC: 2.5 G/DL (ref 3.5–5)
ANION GAP SERPL CALCULATED.3IONS-SCNC: 9 MMOL/L (ref 4–13)
BUN SERPL-MCNC: 31 MG/DL (ref 5–25)
CALCIUM SERPL-MCNC: 7.7 MG/DL (ref 8.3–10.1)
CHLORIDE SERPL-SCNC: 107 MMOL/L (ref 100–108)
CO2 SERPL-SCNC: 25 MMOL/L (ref 21–32)
CREAT SERPL-MCNC: 1.24 MG/DL (ref 0.6–1.3)
ERYTHROCYTE [DISTWIDTH] IN BLOOD BY AUTOMATED COUNT: 16.8 % (ref 11.6–15.1)
GFR SERPL CREATININE-BSD FRML MDRD: 38 ML/MIN/1.73SQ M
GLUCOSE SERPL-MCNC: 88 MG/DL (ref 65–140)
HCT VFR BLD AUTO: 31.4 % (ref 34.8–46.1)
HGB BLD-MCNC: 9.3 G/DL (ref 11.5–15.4)
LACTATE SERPL-SCNC: 0.7 MMOL/L (ref 0.5–2)
MAGNESIUM SERPL-MCNC: 1.8 MG/DL (ref 1.6–2.6)
MCH RBC QN AUTO: 25.9 PG (ref 26.8–34.3)
MCHC RBC AUTO-ENTMCNC: 29.6 G/DL (ref 31.4–37.4)
MCV RBC AUTO: 88 FL (ref 82–98)
PLATELET # BLD AUTO: 277 THOUSANDS/UL (ref 149–390)
PMV BLD AUTO: 9.6 FL (ref 8.9–12.7)
POTASSIUM SERPL-SCNC: 3.8 MMOL/L (ref 3.5–5.3)
RBC # BLD AUTO: 3.59 MILLION/UL (ref 3.81–5.12)
SODIUM SERPL-SCNC: 141 MMOL/L (ref 136–145)
WBC # BLD AUTO: 10.4 THOUSAND/UL (ref 4.31–10.16)

## 2020-07-29 PROCEDURE — 99222 1ST HOSP IP/OBS MODERATE 55: CPT | Performed by: INTERNAL MEDICINE

## 2020-07-29 PROCEDURE — 82040 ASSAY OF SERUM ALBUMIN: CPT | Performed by: STUDENT IN AN ORGANIZED HEALTH CARE EDUCATION/TRAINING PROGRAM

## 2020-07-29 PROCEDURE — 99232 SBSQ HOSP IP/OBS MODERATE 35: CPT | Performed by: STUDENT IN AN ORGANIZED HEALTH CARE EDUCATION/TRAINING PROGRAM

## 2020-07-29 PROCEDURE — 85027 COMPLETE CBC AUTOMATED: CPT | Performed by: STUDENT IN AN ORGANIZED HEALTH CARE EDUCATION/TRAINING PROGRAM

## 2020-07-29 PROCEDURE — 97167 OT EVAL HIGH COMPLEX 60 MIN: CPT

## 2020-07-29 PROCEDURE — 83735 ASSAY OF MAGNESIUM: CPT | Performed by: STUDENT IN AN ORGANIZED HEALTH CARE EDUCATION/TRAINING PROGRAM

## 2020-07-29 PROCEDURE — 97163 PT EVAL HIGH COMPLEX 45 MIN: CPT

## 2020-07-29 PROCEDURE — 83605 ASSAY OF LACTIC ACID: CPT | Performed by: NURSE PRACTITIONER

## 2020-07-29 PROCEDURE — 80048 BASIC METABOLIC PNL TOTAL CA: CPT | Performed by: STUDENT IN AN ORGANIZED HEALTH CARE EDUCATION/TRAINING PROGRAM

## 2020-07-29 RX ADMIN — STANDARDIZED SENNA CONCENTRATE 8.6 MG: 8.6 TABLET ORAL at 08:48

## 2020-07-29 RX ADMIN — NICOTINE 1 PATCH: 14 PATCH TRANSDERMAL at 08:52

## 2020-07-29 RX ADMIN — HEPARIN SODIUM 5000 UNITS: 5000 INJECTION INTRAVENOUS; SUBCUTANEOUS at 05:47

## 2020-07-29 RX ADMIN — HEPARIN SODIUM 5000 UNITS: 5000 INJECTION INTRAVENOUS; SUBCUTANEOUS at 14:20

## 2020-07-29 RX ADMIN — SERTRALINE HYDROCHLORIDE 25 MG: 25 TABLET ORAL at 08:49

## 2020-07-29 RX ADMIN — AMLODIPINE BESYLATE 5 MG: 5 TABLET ORAL at 08:49

## 2020-07-29 RX ADMIN — DONEPEZIL HYDROCHLORIDE 10 MG: 5 TABLET ORAL at 08:49

## 2020-07-29 RX ADMIN — DOCUSATE SODIUM 100 MG: 100 CAPSULE, LIQUID FILLED ORAL at 08:48

## 2020-07-29 RX ADMIN — HEPARIN SODIUM 5000 UNITS: 5000 INJECTION INTRAVENOUS; SUBCUTANEOUS at 22:01

## 2020-07-29 RX ADMIN — ASPIRIN 81 MG: 81 TABLET, COATED ORAL at 08:49

## 2020-07-29 RX ADMIN — DOCUSATE SODIUM 100 MG: 100 CAPSULE, LIQUID FILLED ORAL at 17:54

## 2020-07-29 NOTE — MALNUTRITION/BMI
This medical record reflects one or more clinical indicators suggestive of malnutrition  Malnutrition Findings:   Malnutrition type: Chronic illness  Degree of Malnutrition: Other severe protein calorie malnutrition  Malnutrition Characteristics: Fat loss, Muscle loss, Weight loss(related to inadequate energy intake as evidenced by 15% weight loss 5/31-7/28/20 , hollow supraclavicular space, sunken orbital, wasted temporalis  Int Reg diet, ensure compact BID)    BMI Findings:  BMI Classifications: Underweight < 18 5     Body mass index is 14 01 kg/m²  See Nutrition note dated 7/29/2020 for additional details  Completed nutrition assessment is viewable in the nutrition documentation

## 2020-07-29 NOTE — CONSULTS
Consultation - Palliative and Supportive Care   Tyrell Gallegos 80 y o  female [id number]    Assessment:      Patient Active Problem List   Diagnosis    Diarrhea    Tobacco dependence    Hemoptysis    Dementia ([facility] [address] )    Hypertension    History of TB (tuberculosis)    Hyperkalemia    Lung mass    Change in bowel habits    Depression    Hearing loss    Osteoarthritis of knee    Urinary incontinence    Vitamin D deficiency    Small bowel obstruction (HCC)    Numbness and tingling of foot    Bronchiectasis with acute exacerbation (HCC)    Sepsis (HCC)    Emphysema lung (HCC)    Mild protein-calorie malnutrition (HCC)    Vision loss, right eye    Pulmonary fibrosis (HCC)    Failure to thrive in adult    Advanced dementia ([facility] [address] )           Plan:  1  Symptom management -   Recommend global delirium precautions including:      - Establishment of day/night cycle via lights during the day and blinds open  Please limit interruptions at night as medically appropriate  - Provide glasses/hearing aids as apprioriate  - Minimize deliriogenic meds as able  - Provide reorientation including date on board and visible clock  - Avoid restraints as able, frequent verbal reorientations or patient care sitter as appropriate  2  Goals - goals of care conversation with patient's daughter  Explored hospice versus palliative care follow-up  Although patient is thin, with out depressed albumin level, good oral intake, no evidence of weight loss, it would be difficult to justify her as a hospice candidate  Her dementia is certainly not a hospice level as patient is awake alert able to feed herself and able to converse although she is rather hard of hearing  That being said of patient continues to have recurrent admissions with the hydration encephalopathy, she may qualify  Regardless, I spoke with the daughter  She is interested in rehab    She was very appreciative of the phone call and asked a lot of good questions about what hospice would look like if and when she decides to enroll her mother  If patient is readmitted, I would be happy to speak with daughter again depending on her clinical progress  I have reviewed the patient's controlled substance dispensing history in the Prescription Drug Monitoring Program in compliance with the [facility] regulations before prescribing any controlled substances  We appreciate the invitation to be involved in this patient's care  We will continue to follow  Please do not hesitate to reach our on call provider through our clinic answering service at  should you have acute symptom control concerns  Kofi Rios MD  Palliative and Supportive Care  Clinic/Answering Service: [phone]  You can find me on [employer]! IDENTIFICATION:  Inpatient consult to Palliative Care  Consult performed by: Kofi Rios MD  Consult ordered by: Jennifer Sauer MD        Physician Requesting Consult: Jennifer Sauer MD  Reason for Consult / Principal Problem:  Goals of care  Hx and PE limited by:  Patient dementia/hard of hearing    HISTORY OF PRESENT ILLNESS:       Tyrell Gallegos is a 80 y o  female with dementia who presents from home with weakness and altered mental status  Patient was recently at [address] [place] [address] for short-term rehab where daughter reports she actually did quite well  She returned home and had progressive altered mental status with poor p o  Intake  On evaluation, patient was found to have lactic acidosis and acute renal failure  Fortunately patient has since improved with hydration  When I see patient, she is sleeping in bed  She does awaken but is extremely hard of hearing  She is disoriented  I also did see her eating lunch and she appeared to be doing quite well  I spoke to the daughter about goals of care as above      Review of Systems   Unable to perform ROS: dementia       Past Medical History:   Diagnosis Date    Current smoker     Dementia ([facility] [address] )     History of tuberculosis 40 years ago treated     Hypertension     Ovarian cyst      Past Surgical History:   Procedure Laterality Date    APPENDECTOMY      CATARACT EXTRACTION      COLON SURGERY Right     COLONOSCOPY      COLONOSCOPY N/A 10/5/2017    Procedure: COLONOSCOPY;  Surgeon: Eliud Baca MD;  Location: [facility]; Service: Gastroenterology    HYSTERECTOMY      OVARIAN CYST REMOVAL       Social History     Socioeconomic History    Marital status:       Spouse name: Not on file    Number of children: Not on file    Years of education: Not on file    Highest education level: Not on file   Occupational History    Not on file   Social Needs    Financial resource strain: Not on file    Food insecurity:     Worry: Not on file     Inability: Not on file    Transportation needs:     Medical: Not on file     Non-medical: Not on file   Tobacco Use    Smoking status: Current Every Day Smoker     Packs/day: 1 00     Types: Cigarettes    Smokeless tobacco: Current User   Substance and Sexual Activity    Alcohol use: Not Currently     Frequency: Never    Drug use: No    Sexual activity: Not Currently   Lifestyle    Physical activity:     Days per week: Not on file     Minutes per session: Not on file    Stress: Not on file   Relationships    Social connections:     Talks on phone: Not on file     Gets together: Not on file     Attends Jain service: Not on file     Active member of club or organization: Not on file     Attends meetings of clubs or organizations: Not on file     Relationship status: Not on file    Intimate partner violence:     Fear of current or ex partner: Not on file     Emotionally abused: Not on file     Physically abused: Not on file     Forced sexual activity: Not on file   Other Topics Concern    Not on file   Social History Narrative    Caffeine use     Family History   Problem Relation Age of Onset    No Known Problems Mother     Hypertension Father        MEDICATIONS / ALLERGIES:    all current active meds have been reviewed and current meds:   Current Facility-Administered Medications   Medication Dose Route Frequency    acetaminophen (TYLENOL) tablet 650 mg  650 mg Oral Q6H PRN    amLODIPine (NORVASC) tablet 5 mg  5 mg Oral Daily    aspirin (ECOTRIN LOW STRENGTH) EC tablet 81 mg  81 mg Oral Daily    calcium carbonate (TUMS) chewable tablet 1,000 mg  1,000 mg Oral Daily PRN    docusate sodium (COLACE) capsule 100 mg  100 mg Oral BID    donepezil (ARICEPT) tablet 10 mg  10 mg Oral Daily    heparin (porcine) subcutaneous injection 5,000 Units  5,000 Units Subcutaneous Q8H [address]    nicotine (NICODERM CQ) 14 mg/24hr TD 24 hr patch 1 patch  1 patch Transdermal Daily    ondansetron (ZOFRAN) injection 4 mg  4 mg Intravenous Q6H PRN    senna (SENOKOT) tablet 8 6 mg  1 tablet Oral Daily    sertraline (ZOLOFT) tablet 25 mg  25 mg Oral Daily       Allergies   Allergen Reactions    Penicillins Other (See Comments)     Unknown reaction       OBJECTIVE:    Physical Exam  Physical Exam   Constitutional: No distress  Frail and elderly appearing   HENT:   Head: Atraumatic  Right Ear: External ear normal    Left Ear: External ear normal    Nose: Nose normal    Eyes: Right eye exhibits no discharge  Left eye exhibits no discharge  Cardiovascular: Normal rate  Pulmonary/Chest: Effort normal  No respiratory distress  Abdominal: She exhibits no distension  Musculoskeletal: She exhibits deformity (Noted muscle wasting and face forearms and legs  )  Neurological: She is alert  Hard of hearing   Skin: Skin is dry  She is not diaphoretic  No pallor  Psychiatric:   Pleasantly confused   Nursing note and vitals reviewed  Lab Results:   I have personally reviewed pertinent labs  , CBC:   Lab Results   Component Value Date    WBC 10 40 (H) 07/29/2020    HGB 9 3 (L) 07/29/2020    HCT 31 4 (L) 07/29/2020    MCV 88 07/29/2020     07/29/2020    MCH 25 9 (L) 07/29/2020    MCHC 29 6 (L) 07/29/2020    RDW 16 8 (H) 07/29/2020    MPV 9 6 07/29/2020   , CMP:   Lab Results   Component Value Date    SODIUM 141 07/29/2020    K 3 8 07/29/2020     07/29/2020    CO2 25 07/29/2020    BUN 31 (H) 07/29/2020    CREATININE 1 24 07/29/2020    CALCIUM 7 7 (L) 07/29/2020    EGFR 38 07/29/2020   , BMP:  Lab Results   Component Value Date    SODIUM 141 07/29/2020    K 3 8 07/29/2020     07/29/2020    CO2 25 07/29/2020    BUN 31 (H) 07/29/2020    CREATININE 1 24 07/29/2020    GLUC 88 07/29/2020    CALCIUM 7 7 (L) 07/29/2020    AGAP 9 07/29/2020    EGFR 38 07/29/2020     Imaging Studies:  I personally reviewed relevant reports  EKG, Pathology, and Other Studies:  I personally reviewed relevant reports    Counseling / Coordination of Care    Total floor / unit time spent today 50+ minutes  Greater than 50% of total time was spent with the patient and / or family counseling and / or coordination of care (14:30-14:55)  A description of the counseling / coordination of care:  Phone call with patient's daughter, goals of care, introduction of hospice services come introduction palliative care, patient assessment  Adelina Huertas

## 2020-07-29 NOTE — PLAN OF CARE
Problem: OCCUPATIONAL THERAPY ADULT  Goal: Performs self-care activities at highest level of function for planned discharge setting  See evaluation for individualized goals  Description  Treatment Interventions: ADL retraining, Functional transfer training, Endurance training, Cognitive reorientation, Patient/family training, Equipment evaluation/education, Compensatory technique education, Energy conservation, Activityengagement  Equipment Recommended: Other (comment)(none )     See flowsheet documentation for full assessment, interventions and recommendations  Note:   Limitation: Decreased ADL status, Decreased Safe judgement during ADL, Decreased cognition, Decreased endurance, Decreased self-care trans, Decreased high-level ADLs  Prognosis: Good  Assessment: Pt is a 80 y o  F admitted to [facility] [address] [place] [address] on 7/28/2020 with loss of appetite  Comorbidities limiting pt performance include: advanced dementia, mild protein-calorie malnutrition, depression, hypertension, tobacco dependence, and failure to thrive in adult  OT orders received  Pt chart reviewed  Performed at least two patient identifiers including name and wrist band during session  Prior to admission, pt required assistance for ADLs and functional mobility  Pt lives with daughter in a two-level home, with 3-4 JORGE  Pt has a walk-in shower and standard toilet with no DME  Pt ambulatory with use of RW at home  Pt reports that she is independent in ADLs  Upon evaluation, pt was received, alert and oriented to person and place, disoriented to time and siutation, with no s/s of distress  Pt required supervision assist for eating and grooming assistance  Pt required min A for UB ADLs, toileting and functional assistance  Pt required mod A for LB ADLs  While completing bed mobility, pt required min A of 1, with increased time and verbal cues  Pt transferred from bed to to standing and ambulated to bathroom with min A of 1, with use of RW  Deficits limiting pt occupational performance at this time include: decreased high level cognition, such as decreased insight, decreased short term memory, and decreased orientation, decreased dynamic sitting and standing balance, and decreased engagement of daily activities  Occupational performance areas limited due to the deficits mentioned include: bathing, dressing, toileting, grooming, functional and community assistance, and leisure engagement/particiaption  Pt would benefit from continued skilled OT services while admitted to hospital to address the deficits mentioned above and increase functional independence in daily activity  From on OT standpoint, recommendation at d/c would be short term rehab  OT Discharge Recommendation: Post-Acute Rehabilitation Services  OT - OK to Discharge:  Yes

## 2020-07-29 NOTE — SOCIAL WORK
Per Dr Mason Paulson, he reported that pt is doing okay right now and is not agreeable to hospice at this time  He reported that she would like to go to [facility] [address]  CM will continue to follow

## 2020-07-29 NOTE — SPEECH THERAPY NOTE
Spoke with RN  Patient appears to be tolerating regular diet at this time with no sxs of aspiration  Will plan to f/u within 24 hours      ROMARIO Norman S , [address] [facility]  Speech Language Pathologist   Available via [address] [place] [address] #[id number]  [place] #[id number]

## 2020-07-29 NOTE — PROGRESS NOTES
Per phone conversation with daughter today 07/29/20 , patient's purse, glasses, and hearing aids were never brought into the hospital and are at patient's home

## 2020-07-29 NOTE — PROGRESS NOTES
Progress Note Lorenzo Cooper [birth year], 80 y o  female MRN: [id number]    Unit/Bed#: -01 Encounter: [id number]    Primary Care Provider: Erica Doherty MD   Date and time admitted to hospital: 7/28/2020 11:48 AM    KAREN  -prerenal in setting of dehydration from poor PO intake, now improved with IV fluids     Advanced dementia [facility])  Assessment & Plan  Continue home dose aricept     Severe protein-calorie malnutrition due to chronic illness as evidenced by 15% weight loss since 5/31/20, hollow supraclavicular space, sunken orbital and wasted temporalis, requiring nutritional supplement with ensure compact BID  See plan for failure to thrive    Depression  Assessment & Plan  Continue home dose zoloft    Hypertension  Assessment & Plan  Normotensive, continue home dose amlodipine     Tobacco dependence  Assessment & Plan  Continue nicotine patch     * Failure to thrive in adult  Assessment & Plan  Not eating at home, although while admitted patient ate 80% of her lunch  Unclear what the home situation is, but her appetite is good at the hospital   Consulted Speech/swallow and palliative care  Level 1 at this time, needs goals of care discussion       VTE Pharmacologic Prophylaxis:   Pharmacologic: Heparin  Mechanical VTE Prophylaxis in Place: Yes    Patient Centered Rounds: I have performed bedside rounds with nursing staff today  Discussions with Specialists or Other Care Team Provider: Palliative care    Education and Discussions with Family / Patient: Called home and cell number of daughter, with no answer     Time Spent for Care: 30 minutes  More than 50% of total time spent on counseling and coordination of care as described above  Current Length of Stay: 1 day(s)    Current Patient Status: Inpatient   Certification Statement: The patient will continue to require additional inpatient hospital stay due to Goals of care for failure to thrive    Discharge Plan:  To be determined    Code Status: Level 1 - Full Code      Subjective:   Patient has no complaints     Objective:     Vitals:   Temp (24hrs), Av 5 °F (36 9 °C), Min:97 8 °F (36 6 °C), Max:99 3 °F (37 4 °C)    Temp:  [97 8 °F (36 6 °C)-99 3 °F (37 4 °C)] 97 9 °F (36 6 °C)  HR:  [68-90] 71  Resp:  [15-22] 15  BP: (111-169)/(46-70) 135/56  SpO2:  [92 %-98 %] 97 %  Body mass index is 14 01 kg/m²  Input and Output Summary (last 24 hours): Intake/Output Summary (Last 24 hours) at 2020 1050  Last data filed at 2020 0900  Gross per 24 hour   Intake 860 ml   Output 75 ml   Net 785 ml       Physical Exam:     Physical Exam   Constitutional:   Cachectic, Malnourished   HENT:   Head: Normocephalic and atraumatic  Eyes: Right eye exhibits no discharge  Left eye exhibits no discharge  Cardiovascular: Normal rate and regular rhythm  Pulmonary/Chest: Effort normal  No respiratory distress  Abdominal: Soft  Bowel sounds are normal    Neurological: She is alert  Skin: Skin is warm and dry  Psychiatric: She has a normal mood and affect   Her behavior is normal          Additional Data:     Labs:    Results from last 7 days   Lab Units 20  0602 20  1213   WBC Thousand/uL 10 40* 17 86*   HEMOGLOBIN g/dL 9 3* 11 6   HEMATOCRIT % 31 4* 40 4   PLATELETS Thousands/uL 277 354   NEUTROS PCT %  --  92*   LYMPHS PCT %  --  3*   MONOS PCT %  --  4   EOS PCT %  --  0     Results from last 7 days   Lab Units 20  0602 20  1213   SODIUM mmol/L 141 144   POTASSIUM mmol/L 3 8 4 5   CHLORIDE mmol/L 107 103   CO2 mmol/L 25 21   BUN mg/dL 31* 28*   CREATININE mg/dL 1 24 1 64*   ANION GAP mmol/L 9 20*   CALCIUM mg/dL 7 7* 8 7   ALBUMIN g/dL  --  3 4*   TOTAL BILIRUBIN mg/dL  --  0 50   ALK PHOS U/L  --  59   ALT U/L  --  18   AST U/L  --  34   GLUCOSE RANDOM mg/dL 88 111     Results from last 7 days   Lab Units 20  1213   INR  1 10             Results from last 7 days   Lab Units 20  0137 20  2208 20  1449 07/28/20  1213   LACTIC ACID mmol/L 0 7 2 9* 2 2* 3 0*   PROCALCITONIN ng/ml  --   --   --  <0 05           * I Have Reviewed All Lab Data Listed Above  * Additional Pertinent Lab Tests Reviewed: Yulisa 66 Admission Reviewed    Imaging:    Imaging Reports Reviewed Today Include: NA  Imaging Personally Reviewed by Myself Includes:  NA    Recent Cultures (last 7 days):     Results from last 7 days   Lab Units 07/28/20  1449 07/28/20  1213   BLOOD CULTURE  Received in Microbiology Lab  Culture in Progress  Received in Microbiology Lab  Culture in Progress  Last 24 Hours Medication List:     Current Facility-Administered Medications:  acetaminophen 650 mg Oral Q6H PRN Ryanne Tobias MD   amLODIPine 5 mg Oral Daily Ryanne Tobisa MD   aspirin 81 mg Oral Daily Ryanne Tobias MD   calcium carbonate 1,000 mg Oral Daily PRN Ryanne Tobias MD   docusate sodium 100 mg Oral BID Ryanne Tobias MD   donepezil 10 mg Oral Daily Ryanne Tobias MD   heparin (porcine) 5,000 Units Subcutaneous [facility] Ryanne Tobias MD   nicotine 1 patch Transdermal Daily Ryanne Tobias MD   ondansetron 4 mg Intravenous Q6H PRN Ryanne Tobias MD   senna 1 tablet Oral Daily Ryanne Tobias MD   sertraline 25 mg Oral Daily Ryanne Tobias MD        Today, Patient Was Seen By: ROMARIO Reilly      ** Please Note: Dictation voice to text software may have been used in the creation of this document   **

## 2020-07-29 NOTE — OCCUPATIONAL THERAPY NOTE
Occupational Therapy Evaluation Note    Patient Name: Renita Newton  NARMJ'A Date: 0/71/8265 07/29/20 2364   Note Type   Note type Eval only   Restrictions/Precautions   Weight Bearing Precautions Per Order No   Braces or Orthoses Other (Comment)  (none )   Other Precautions Bed Alarm; Chair Alarm;Telemetry; Fall Risk;Hard of hearing   Pain Assessment   Pain Assessment Tool Pain Assessment not indicated - pt denies pain   Pain Score No Pain   Multiple Pain Sites No   Home Living   Type of Home House   Home Layout Two level;Bed/bath upstairs  (3-4 JORGE)   Bathroom Shower/Tub Walk-in shower   Bathroom Toilet Standard   Bathroom Equipment Other (Comment)  (none )   Bathroom Accessibility Accessible   Home Equipment Walker;Cane   Additional Comments Pt ambulatory with use of RW  Prior Function   Level of [place] Needs assistance with ADLs and functional mobility   Lives With Family;Daughter   Receives Help From Family   ADL Assistance Independent   IADLs Needs assistance   Falls in the last 6 months 0  (no falls since last admission )   Vocational Retired   Comments pt does not drive    Lifestyle   Autonomy Pt lives with daughter in a two-level home, with 3-4 JORGE  Pt has a walk-in shower and standard toilet with no DME  Pt ambulatory with use of RW at home  Pt reports that she is independent in ADLs  Reciprocal Relationships Supportive Family    Psychosocial   Psychosocial (WDL) WDL   ADL   Where Assessed Supine, bed   Equipment Provided Other (Comment)  (none )   Eating Assistance 5  Supervision/Setup   Eating Deficit Setup;Supervision/safety; Increased time to complete   Grooming Assistance 5  Supervision/Setup   Grooming Deficit Setup;Verbal cueing;Supervision/safety; Increased time to complete   UB Bathing Assistance 4  Minimal Assistance   UB Bathing Deficit Setup;Verbal cueing;Supervision/safety; Increased time to complete   LB Bathing Assistance 3  Moderate Assistance   LB Bathing Deficit Setup;Verbal cueing;Supervision/safety; Increased time to complete   UB Dressing Assistance 4  Minimal Assistance   UB Dressing Deficit Setup;Verbal cueing;Supervision/safety; Increased time to complete   LB Dressing Assistance 3  Moderate Assistance   LB Dressing Deficit Setup; Impulsive;Verbal cueing;Supervision/safety   Toileting Assistance  4  Minimal Assistance   Toileting Deficit Setup;Verbal cueing;Supervison/safety; Increased time to complete   Functional Assistance 4  Minimal Assistance   Functional Deficit Setup;Verbal cueing;Supervision/safety; Increased time to complete   Bed Mobility   Supine to Sit 4  Minimal assistance   Additional items Assist x 1;HOB elevated; Bedrails; Increased time required;Verbal cues;LE management   Transfers   Sit to Stand 4  Minimal assistance   Additional items Assist x 1;Bedrails;Armrests; Increased time required;Verbal cues   Stand to Sit 4  Minimal assistance   Additional items Assist x 1; Increased time required; Impulsive;Verbal cues; Bedrails   Toilet transfer 4  Minimal assistance   Additional items Assist x 1;Standard toilet; Increased time required   Functional Mobility   Functional Mobility 4  Minimal assistance   Additional Comments Pt ambulated to the bathroom with use of RW, with min A of 1  Additional items Rolling walker   Balance   Static Sitting Fair +   Dynamic Sitting Fair   Static Standing Fair -   Dynamic Standing Poor +   Activity Tolerance   Activity Tolerance Patient tolerated treatment well   Nurse Made Aware RN verbalized pt appropriate for therapy  Therapist spoke with PCA for assist with mobility      RUE Assessment   RUE Assessment WFL   LUE Assessment   LUE Assessment WFL   Hand Function   Gross Motor Coordination Functional   Fine Motor Coordination Functional   Sensation   Light Touch No apparent deficits  (BUEs)   Vision-Basic Assessment   Current Vision Wears glasses only for reading   Visual History Other (Comment)  (none )   Patient Visual Report Other (Comment)  (pt reported no changes in vision )   Cognition   Overall Cognitive Status Impaired   Arousal/Participation Alert; Responsive; Cooperative   Attention Within functional limits   Orientation Level Oriented to person;Oriented to place; Disoriented to time;Disoriented to situation   Memory Decreased recall of biographical information;Decreased short term memory;Decreased recall of recent events   Following Commands Follows one step commands without difficulty   Comments Pt alert and oriented to person and place, disoriented to situation and time  Pt demonstrated ability to attend and follow one-step directions during evaluation  Assessment   Limitation Decreased ADL status; Decreased Safe judgement during ADL;Decreased cognition;Decreased endurance;Decreased self-care trans;Decreased high-level ADLs   Prognosis Good   Assessment Pt is a 80 y o  F admitted to [facility] [address] [facility] on 7/28/2020 with loss of appetite  Comorbidities limiting pt performance include: advanced dementia, mild protein-calorie malnutrition, depression, hypertension, tobacco dependence, and failure to thrive in adult  OT orders received  Pt chart reviewed  Performed at least two patient identifiers including name and wrist band during session  Prior to admission, pt required assistance for ADLs and functional mobility  Pt lives with daughter in a two-level home, with 3-4 JORGE  Pt has a walk-in shower and standard toilet with no DME  Pt ambulatory with use of RW at home  Pt reports that she is independent in ADLs  Upon evaluation, pt was received, alert and oriented to person and place, disoriented to time and siutation, with no s/s of distress  Pt required supervision assist for eating and grooming assistance  Pt required min A for UB ADLs, toileting and functional assistance  Pt required mod A for LB ADLs  While completing bed mobility, pt required min A of 1, with increased time and verbal cues   Pt transferred from bed to to standing and ambulated to bathroom with min A of 1, with use of RW  Deficits limiting pt occupational performance at this time include: decreased high level cognition, such as decreased insight, decreased short term memory, and decreased orientation, decreased dynamic sitting and standing balance, and decreased engagement of daily activities  Occupational performance areas limited due to the deficits mentioned include: bathing, dressing, toileting, grooming, functional and community assistance, and leisure engagement/particiaption  Pt would benefit from continued skilled OT services while admitted to hospital to address the deficits mentioned above and increase functional independence in daily activity  From on OT standpoint, recommendation at d/c would be short term rehab  Goals   Patient Goals none stated    Plan   Treatment Interventions ADL retraining;Functional transfer training; Endurance training;Cognitive reorientation;Patient/family training;Equipment evaluation/education; Compensatory technique education; Energy conservation; Activityengagement   Goal Expiration Date 08/12/20   OT Frequency 3-5x/wk   Recommendation   OT Discharge Recommendation Post-Acute Rehabilitation Services   Equipment Recommended Other (comment)  (none )   OT - OK to Discharge Yes   Barthel Index   Feeding 5   Bathing 0   Grooming Score 0   Dressing Score 5   Bladder Score 10   Bowels Score 10   Toilet Use Score 5   Transfers (Bed/Chair) Score 10   Mobility (Level Surface) Score 0   Stairs Score 0   Barthel Index Score 45   Modified [place] Scale   Modified Khadar Scale 4     Patient will be alert and oriented to person, place, time, and situation for 80% of opportunities to increase engagement in daily tasks       Patient will verbalize and demonstrate good safety awareness and joint protection techniques with completing a functional activity with no more than 3 verbal cues       Patient will complete UB ADLs while at sink/countertop with supervision assist to increase engagement in daily activities       Patient will complete LB ADLs while seated/EOB at mod I level to increase engagement in daily activities       Patient will follow a daily routine at supervision assist to increase orientation and engagement in daily functional activities       Patient will transfer from chair/bed to chair/toilet/shower with supervision assist with set up to increase functional independence       Patient will tolerate being OOB for 3-5 hours per day to increase engagement in daily functional activities and increase participation in leisure activities       Patient will engage in 1 leisure activity per day to increase participation in leisure activities

## 2020-07-29 NOTE — ASSESSMENT & PLAN NOTE
Not eating, likely secondary to advanced dementia  Consulted Speech/swallow and palliative care  Level 1 at this time, needs goals of care discussion

## 2020-07-29 NOTE — PLAN OF CARE
Problem: Nutrition/Hydration-ADULT  Goal: Nutrient/Hydration intake appropriate for improving, restoring or maintaining nutritional needs  Description  Monitor and assess patient's nutrition/hydration status for malnutrition  Collaborate with interdisciplinary team and initiate plan and interventions as ordered  Monitor patient's weight and dietary intake as ordered or per policy  Utilize nutrition screening tool and intervene as necessary  Determine patient's food preferences and provide high-protein, high-caloric foods as appropriate  INTERVENTIONS:  - Monitor oral intake, urinary output, labs, and treatment plans  - Assess nutrition and hydration status and recommend course of action  - Evaluate amount of meals eaten  - Assist patient with eating if necessary   - Allow adequate time for meals  - Recommend/ encourage appropriate diets, oral nutritional supplements, and vitamin/mineral supplements  - Order, calculate, and assess calorie counts as needed  - Recommend, monitor, and adjust tube feedings based on assessed needs  - Assess need for intravenous fluids  - Provide nutrition/hydration education as appropriate  - Include patient/family/caregiver in decisions related to nutrition   Outcome: Progressing  Note:   Patient demonstrating interest in food and is consuming roughly 75% of meal trays  Denies nausea or vomiting

## 2020-07-29 NOTE — PHYSICAL THERAPY NOTE
PT Initial Evaluation     Physical Therapy Evaluation     Patient's Name: Jeison Turner    Admitting Diagnosis  Dehydration [E86 0]  Loss of appetite [R63 0]  Dementia associated with other underlying disease without behavioral disturbance ([facility] [address] ) [F02 80]    Problem List  Patient Active Problem List   Diagnosis    Diarrhea    Tobacco dependence    Hemoptysis    Dementia ([facility] )    Hypertension    History of TB (tuberculosis)    Hyperkalemia    Lung mass    Change in bowel habits    Depression    Hearing loss    Osteoarthritis of knee    Urinary incontinence    Vitamin D deficiency    Small bowel obstruction (HCC)    Numbness and tingling of foot    Bronchiectasis with acute exacerbation ([facility] [address] )    Sepsis ([facility] )    Emphysema lung ([facility] [facility] )    Mild protein-calorie malnutrition ([facility] [facility] )    Vision loss, right eye    Pulmonary fibrosis (HCC)    Failure to thrive in adult    Advanced dementia ([facility] [address] )       Past Medical History  Past Medical History:   Diagnosis Date    Current smoker     Dementia ([facility] [facility] )     History of tuberculosis 40 years ago treated     Hypertension     Ovarian cyst        Past Surgical History  Past Surgical History:   Procedure Laterality Date    APPENDECTOMY      CATARACT EXTRACTION      COLON SURGERY Right     COLONOSCOPY      COLONOSCOPY N/A 10/5/2017    Procedure: COLONOSCOPY;  Surgeon: Ynes Pandya MD;  Location: [facility];   Service: Gastroenterology    HYSTERECTOMY      OVARIAN CYST REMOVAL              07/29/20 1528   Note Type   Note type Eval only   Pain Assessment   Pain Assessment Tool Pain Assessment not indicated - pt denies pain   Pain Score No Pain   Multiple Pain Sites No   Home Living   Type of Home House   Home Layout Two level;Bed/bath upstairs   Bathroom Shower/Tub Walk-in shower   Bathroom Toilet Standard   Bathroom Accessibility Accessible   Home Equipment Walker;Cane   Additional Comments Pt  ambulatory with use of RW   Prior Function   Level of [place] Needs assistance with ADLs and functional mobility   Lives With Family;Daughter   Receives Help From Family   ADL Assistance Independent   IADLs Needs assistance   Falls in the last 6 months 0  (no falls since last admission )   Vocational Retired   Restrictions/Precautions   Wells Jonn Bearing Precautions Per Order No   Braces or Orthoses Other (Comment)  (none )   Other Precautions Chair Alarm; Bed Alarm;Hard of hearing; Fall Risk;Cognitive   General   Family/Caregiver Present No   Cognition   Overall Cognitive Status Impaired   Arousal/Participation Responsive   Attention Within functional limits   Orientation Level Oriented to person;Oriented to place; Disoriented to time;Disoriented to situation   Memory Decreased recall of biographical information;Decreased short term memory;Decreased recall of recent events   Following Commands Follows one step commands without difficulty   RUE Assessment   RUE Assessment WFL   LUE Assessment   LUE Assessment WFL   RLE Assessment   RLE Assessment   (3+/5 grossly, atrophy B/L LE)   LLE Assessment   LLE Assessment   (3+/5 grossly , atrophy B/L LE)   Bed Mobility   Supine to Sit 4  Minimal assistance   Additional items Assist x 1; Increased time required;Verbal cues;HOB elevated   Transfers   Sit to Stand 4  Minimal assistance   Additional items Assist x 1; Increased time required;Verbal cues   Stand to Sit 4  Minimal assistance   Additional items Assist x 1; Increased time required;Verbal cues   Additional Comments VC provided during ambulation for placement in walker  VC for reachback during transfers  Ambulation/Elevation   Gait pattern Short stride; Excessively slow;Decreased foot clearance; Forward Flexion; Wide MADELYN   Gait Assistance 4  Minimal assist   Additional items Assist x 1   Assistive Device Rolling walker   Distance 20'x2  (Moderate SOB s/p, SPO2 within functional limits)   Balance   Static Sitting Fair +   Dynamic Sitting Fair   Static Standing Fair -   Dynamic Standing Poor +   Activity Tolerance   Activity Tolerance Patient tolerated treatment well   Nurse Made Aware RN ok to see   Assessment   Prognosis Good   Problem List Decreased strength;Decreased range of motion;Decreased endurance; Impaired balance;Decreased mobility; Decreased cognition;Decreased safety awareness   Assessment Pt is 80 y o  female seen for PT evaluation s/p admit to [facility] on 7/28/2020 w/ Failure to thrive in adult  PT consulted to assess pt's functional mobility and d/c needs  Order placed for PT eval and tx, w/ activity order  Comorbidities affecting pt's physical performance at time of assessment include: Failure to thrive, HTN, dementia, pulmonary fibrosis, urinary incontinence  PTA, pt was independent w/ all functional mobility w/ RW  Personal factors affecting pt at time of IE include: lives in 2 story house, ambulating w/ assistive device, inability to navigate community distances, decreased cognition, hearing impairments, inability to perform IADLs, inability to perform ADLs and inability to live alone  Please find objective findings from PT assessment regarding body systems outlined above with impairments and limitations including weakness, impaired balance, decreased endurance, gait deviations, decreased activity tolerance, decreased functional mobility tolerance, decreased safety awareness, fall risk and decreased cognition  The following objective measures performed on IE also reveal limitations: Barthel Index: 45/100  Pt's clinical presentation is currently unstable/unpredictable seen in pt's presentation  Pt to benefit from continued PT tx to address deficits as defined above and maximize level of functional independent mobility and consistency  From PT/mobility standpoint, recommendation at time of d/c would be post acute rehab services pending progress in order to facilitate return to PLOF     Barriers to Discharge Other (Comment)  (decline from baseline, full 24/7 support and assist required)   Goals   Patient Goals "To rest after this"   STG Expiration Date 08/12/20   Short Term Goal #1 1  Pt will complete bed mobility with Mod I to increase functional mobility  2  Pt will complete sit to stand transfers with Mod I to increase functional mobility  3  Pt will ambulate 75  ft with RW with Mod I without LOB and good positioning within RW  4  Pt will increase B/L LE strength by 1 grade to facilitate improved functional mobility with decreased risk of falls  5  Pt will increase standing balance to fair in order to decrease risk of falls  6  PT to assess stair goals as appropriate  PT Treatment Day 0   Plan   Treatment/Interventions Functional transfer training;LE strengthening/ROM; Elevations; Therapeutic exercise; Endurance training;Bed mobility;Gait training;Patient/family training;Equipment eval/education   PT Frequency   (3-5x/week)   Recommendation   PT Discharge Recommendation Post-Acute Rehabilitation Services   Equipment Recommended Walker   PT - OK to Discharge Yes   Additional Comments when medically stable   Barthel Index   Feeding 5   Bathing 0   Grooming Score 0   Dressing Score 5   Bladder Score 10   Bowels Score 10   Toilet Use Score 5   Transfers (Bed/Chair) Score 10   Mobility (Level Surface) Score 0   Stairs Score 0   Barthel Index Score 45       Brandon England, PT;

## 2020-07-29 NOTE — ED NOTES
CC- dehydration, loss of appetite x3days per family  Admission related to injury?-n/a   Orientation status- alert and oriented to place, person, and situation at times  Abnormal labs/abnormal focused assessment/vitals- wbc 17 9, lactic 2 2, bun 28, creat 1 64,   Medication/drips- 250 ml NSS bolus given for lactic 3, brought down to 2 2  Last time narcotics given- n/a  IV lines/drains/etc- 20 R AC  Isolation status- n/a  Skin- bruise on L shin  Ambulation- per pt uses wheelchair due to easy fatigue when walking?    ED nurse's name and phone number- [phone]  Other- pt covid negative, patient ate 100% dinner today and 80% lunch, patient has good appetite and drinking all day     Fannie Hood RN  07/28/20 2015

## 2020-07-29 NOTE — ASSESSMENT & PLAN NOTE
Malnutrition Findings:           BMI Findings: Body mass index is 14 01 kg/m²       See plan for failure to thrive

## 2020-07-29 NOTE — PLAN OF CARE
Problem: PHYSICAL THERAPY ADULT  Goal: Performs mobility at highest level of function for planned discharge setting  See evaluation for individualized goals  Description  Treatment/Interventions: Functional transfer training, LE strengthening/ROM, Elevations, Therapeutic exercise, Endurance training, Bed mobility, Gait training, Patient/family training, Equipment eval/education  Equipment Recommended: Ramiro Mckeon       See flowsheet documentation for full assessment, interventions and recommendations  Note:   Prognosis: Good  Problem List: Decreased strength, Decreased range of motion, Decreased endurance, Impaired balance, Decreased mobility, Decreased cognition, Decreased safety awareness  Assessment: Pt is 80 y o  female seen for PT evaluation s/p admit to [facility] on 7/28/2020 w/ Failure to thrive in adult  PT consulted to assess pt's functional mobility and d/c needs  Order placed for PT eval and tx, w/ activity order  Comorbidities affecting pt's physical performance at time of assessment include: Failure to thrive, HTN, dementia, pulmonary fibrosis, urinary incontinence  PTA, pt was independent w/ all functional mobility w/ RW  Personal factors affecting pt at time of IE include: lives in 2 story house, ambulating w/ assistive device, inability to navigate community distances, decreased cognition, hearing impairments, inability to perform IADLs, inability to perform ADLs and inability to live alone  Please find objective findings from PT assessment regarding body systems outlined above with impairments and limitations including weakness, impaired balance, decreased endurance, gait deviations, decreased activity tolerance, decreased functional mobility tolerance, decreased safety awareness, fall risk and decreased cognition  The following objective measures performed on IE also reveal limitations: Barthel Index: 45/100   Pt's clinical presentation is currently unstable/unpredictable seen in pt's presentation  Pt to benefit from continued PT tx to address deficits as defined above and maximize level of functional independent mobility and consistency  From PT/mobility standpoint, recommendation at time of d/c would be post acute rehab services pending progress in order to facilitate return to PLOF  Barriers to Discharge: Other (Comment)(decline from baseline, full 24/7 support and assist required)     PT Discharge Recommendation: Post-Acute Rehabilitation Services     PT - OK to Discharge: Yes    See flowsheet documentation for full assessment

## 2020-07-30 VITALS
SYSTOLIC BLOOD PRESSURE: 135 MMHG | BODY MASS INDEX: 13.79 KG/M2 | WEIGHT: 77.82 LBS | TEMPERATURE: 98.8 F | HEART RATE: 71 BPM | HEIGHT: 63 IN | RESPIRATION RATE: 18 BRPM | DIASTOLIC BLOOD PRESSURE: 48 MMHG | OXYGEN SATURATION: 97 %

## 2020-07-30 LAB — SARS-COV-2 RNA RESP QL NAA+PROBE: NEGATIVE

## 2020-07-30 PROCEDURE — 87635 SARS-COV-2 COVID-19 AMP PRB: CPT | Performed by: STUDENT IN AN ORGANIZED HEALTH CARE EDUCATION/TRAINING PROGRAM

## 2020-07-30 PROCEDURE — 92610 EVALUATE SWALLOWING FUNCTION: CPT

## 2020-07-30 PROCEDURE — 99239 HOSP IP/OBS DSCHRG MGMT >30: CPT | Performed by: STUDENT IN AN ORGANIZED HEALTH CARE EDUCATION/TRAINING PROGRAM

## 2020-07-30 RX ADMIN — ASPIRIN 81 MG: 81 TABLET, COATED ORAL at 07:45

## 2020-07-30 RX ADMIN — NICOTINE 1 PATCH: 14 PATCH TRANSDERMAL at 07:43

## 2020-07-30 RX ADMIN — HEPARIN SODIUM 5000 UNITS: 5000 INJECTION INTRAVENOUS; SUBCUTANEOUS at 13:48

## 2020-07-30 RX ADMIN — HEPARIN SODIUM 5000 UNITS: 5000 INJECTION INTRAVENOUS; SUBCUTANEOUS at 05:53

## 2020-07-30 RX ADMIN — DONEPEZIL HYDROCHLORIDE 10 MG: 5 TABLET ORAL at 07:46

## 2020-07-30 RX ADMIN — SERTRALINE HYDROCHLORIDE 25 MG: 25 TABLET ORAL at 07:45

## 2020-07-30 RX ADMIN — AMLODIPINE BESYLATE 5 MG: 5 TABLET ORAL at 07:46

## 2020-07-30 NOTE — SPEECH THERAPY NOTE
Speech-Language Pathology Bedside Swallow Evaluation        Patient Name: El ALFREDO'S Date: 7/30/2020     Problem List  Patient Active Problem List   Diagnosis    Diarrhea    Tobacco dependence    Hemoptysis    Dementia ([address] )    Hypertension    History of TB (tuberculosis)    Hyperkalemia    Lung mass    Change in bowel habits    Depression    Hearing loss    Osteoarthritis of knee    Urinary incontinence    Vitamin D deficiency    Small bowel obstruction (HCC)    Numbness and tingling of foot    Bronchiectasis with acute exacerbation (HCC)    Sepsis ([address]    Emphysema lung ([address]    Mild protein-calorie malnutrition ([address] )    Vision loss, right eye    Pulmonary fibrosis (HCC)    Failure to thrive in adult    Advanced dementia ([address] )       Past Medical History  Past Medical History:   Diagnosis Date    Current smoker     Dementia ([address] )     History of tuberculosis 40 years ago treated     Hypertension     Ovarian cyst        Past Surgical History  Past Surgical History:   Procedure Laterality Date    APPENDECTOMY      CATARACT EXTRACTION      COLON SURGERY Right     COLONOSCOPY      COLONOSCOPY N/A 10/5/2017    Procedure: COLONOSCOPY;  Surgeon: Alba Lynch MD;  Location: [facility]; Service: Gastroenterology    HYSTERECTOMY      OVARIAN CYST REMOVAL           Current Medical Status  Pt is a 80 y o  female who presented to [facility] with  failure to thrive  Patient was recently discharged from [facility], after recent admission in May 2020 for sepsis secondary to UTI  Patient's daughter is reporting for the last 3 days she has not been eating at which point EMS was called for further evaluation  She was found to be disheveled with poor living conditions and brought in for further evaluation  Per staff patients condition has improved since admission with excellent intake   Despite poor dentition patient has been tolerating regular diet with thin liquids  She reports to me she tends to eat softer food  Past medical history:   Please see H&P for details    Special Studies:  7/28 CXR: Stable areas of biapical and left basilar density correlating with areas of chronic fibrosis described on the CT chest from 6/1/2020  No new findings  Swallow Information   Current Risks for Dysphagia & Aspiration: none rpeorted     Current Symptoms/Concerns: poor intake upon admission which is now resovled    Current Diet: regular diet and thin liquids      Baseline Diet: regular diet and thin liquids      Baseline Assessment   Behavior/Cognition: alert    Speech/Language Status: able to participate in conversation and able to follow commands    Patient Positioning: upright in bed      Swallow Mechanism Exam   Facial: symmetrical  Labial: WFL  Lingual: WFL  Velum: symmetrical  Mandible: adequate ROM  Dentition: sparse dentition  Vocal quality:clear/adequate given patients age/gender  Volitional Cough: strong/productive   Resp: RA     Consistencies Assessed and Performance   Consistencies Administered: thin liquids, soft solids and hard solids  Specific materials administered included: pancakes, dexter, fresh fruit ( pineapple, melon etc) and thin juice    Oral Stage: WFL  Mastication was prolonged but adequate with the materials administered today  Bolus formation and transfer were functional with no significant oral residue noted  No overt s/s reduced oral control  Pharyngeal Stage: [facility]  Swallowing initiation appeared prompt  Laryngeal rise was palpated and judged to be within functional limits  No coughing, throat clearing, change in vocal quality or respiratory status noted today  Esophageal Concerns: none reported    Summary   Pts oropharyngeal swallow function appears generally WFL at this time with the materials administered today  She does not appear to be at risk for aspiration at this  time       Recommendations: regular diet and thin liquids ( continue to choose softer solids due to poor/lack of dentition)    Recommended Form of Meds: as tolerated     Aspiration precautions and compensatory swallowing strategies: upright posture, only feed when fully alert, slow rate of feeding, small bites/sips and alternating bites and sips    Results Reviewed with: patient and MD     Plan  No further ST indicated at this time    ROMARIO Randhawa , Jn [address] Pathologist   Available via [address] [place] [address] #[id number]  [place] #[id number]

## 2020-07-30 NOTE — PLAN OF CARE
Problem: Nutrition/Hydration-ADULT  Goal: Nutrient/Hydration intake appropriate for improving, restoring or maintaining nutritional needs  Description  Monitor and assess patient's nutrition/hydration status for malnutrition  Collaborate with interdisciplinary team and initiate plan and interventions as ordered  Monitor patient's weight and dietary intake as ordered or per policy  Utilize nutrition screening tool and intervene as necessary  Determine patient's food preferences and provide high-protein, high-caloric foods as appropriate  INTERVENTIONS:  - Monitor oral intake, urinary output, labs, and treatment plans  - Assess nutrition and hydration status and recommend course of action  - Evaluate amount of meals eaten  - Assist patient with eating if necessary   - Allow adequate time for meals  - Recommend/ encourage appropriate diets, oral nutritional supplements, and vitamin/mineral supplements  - Order, calculate, and assess calorie counts as needed  - Recommend, monitor, and adjust tube feedings based on assessed needs  - Assess need for intravenous fluids  - Provide nutrition/hydration education as appropriate  - Include patient/family/caregiver in decisions related to nutrition   Outcome: Adequate for Discharge  Note:   Patient tolerating meal without issue  Denies nausea or vomiting  No indication of dysphagia  Consuming % of meals

## 2020-07-30 NOTE — SOCIAL WORK
CM contacted [facility] and was provided with a wcv at 7:30pm  time  CM notified Vikki NGUYEN and Arcelia via telephone of transport time

## 2020-07-30 NOTE — PLAN OF CARE
Problem: Nutrition/Hydration-ADULT  Goal: Nutrient/Hydration intake appropriate for improving, restoring or maintaining nutritional needs  Description  Monitor and assess patient's nutrition/hydration status for malnutrition  Collaborate with interdisciplinary team and initiate plan and interventions as ordered  Monitor patient's weight and dietary intake as ordered or per policy  Utilize nutrition screening tool and intervene as necessary  Determine patient's food preferences and provide high-protein, high-caloric foods as appropriate       INTERVENTIONS:  - Monitor oral intake, urinary output, labs, and treatment plans  - Assess nutrition and hydration status and recommend course of action  - Evaluate amount of meals eaten  - Assist patient with eating if necessary   - Allow adequate time for meals  - Recommend/ encourage appropriate diets, oral nutritional supplements, and vitamin/mineral supplements  - Order, calculate, and assess calorie counts as needed  - Recommend, monitor, and adjust tube feedings based on assessed needs  - Assess need for intravenous fluids  - Provide nutrition/hydration education as appropriate  - Include patient/family/caregiver in decisions related to nutrition   Outcome: Progressing

## 2020-07-30 NOTE — SOCIAL WORK
CM spoke with Francisco Cortez from [place] and she said that pt was recently discharged on 7/2/20 so she is still in the 30 day window  COVID results faxed to [phone]

## 2020-07-30 NOTE — SOCIAL WORK
Vikki jain  CM spoke with Cirilo Rodriguez with admission and she stated that pt will need another covid test  CM notified [facility]  CM called Radha Perez at [phone] to inform her  She is agreeable to pt going to [address]

## 2020-07-31 ENCOUNTER — TRANSITIONAL CARE MANAGEMENT (OUTPATIENT)
Dept: FAMILY MEDICINE CLINIC | Facility: MEDICAL CENTER | Age: 85
End: 2020-07-31

## 2020-08-02 LAB
BACTERIA BLD CULT: NORMAL
BACTERIA BLD CULT: NORMAL

## 2020-08-03 NOTE — DISCHARGE SUMMARY
Discharge- Taina Prakash [birth year], 80 y o  female MRN: [id number]    Unit/Bed#: -01 Encounter: [id number]    Primary Care Provider: Danika Browning MD   Date and time admitted to hospital: 7/28/2020 11:48 AM        Advanced dementia [address] [address] )  Assessment & Plan  · Continue home dose aricept     Mild protein-calorie malnutrition ([facility] [address] )  Assessment & Plan  Malnutrition Findings:   Malnutrition type: Chronic illness  Degree of Malnutrition: Other severe protein calorie malnutrition    BMI Findings:  BMI Classifications: Underweight < 18 5     Body mass index is 14 01 kg/m²  See plan for failure to thrive    Depression  Assessment & Plan  · Continue home dose zoloft    Hypertension  Assessment & Plan  · Normotensive, continue home dose amlodipine     Tobacco dependence  Assessment & Plan  · Continue nicotine patch     * Failure to thrive in adult  Assessment & Plan  · Although patient presented with poor PO intake and failure to thrive, but patient had good appetite while admitted   · Could be that the patient's daughter is having difficulty caring for her  · Will discharge to nursing home  · Status post palliative care consult, see note for further recommendations      Discharging Physician / Practitioner: Camilla Carrillo MD  PCP: Danika Browning MD  Admission Date:   Admission Orders (From admission, onward)     Ordered        07/28/20 1339  Inpatient Admission  Once                   Discharge Date: 7/30/20    Disposition:      Other: [facility]    For Discharges to [facility] SNF:   · Not Applicable to this Patient - Not Applicable to this Patient    Reason for Admission: Failure to thrive    Discharge Diagnoses:     Please see assessment and plan section above for further details regarding discharge diagnoses       Resolved Problems  Date Reviewed: 6/3/2020    None          Consultations During Hospital Stay:  · Palliative care, speech and swallow    Procedures Performed:   · Imaging     Medication Adjustments and Discharge Medications:  · Summary of Medication Adjustments made as a result of this hospitalization: See med rec  · Medication Dosing Tapers - Please refer to Discharge Medication List for details on any medication dosing tapers (if applicable to patient)  · Medications being temporarily held (include recommended restart time): See med rec  · Discharge Medication List: See after visit summary for reconciled discharge medications  Wound Care Recommendations:  When applicable, please see wound care section of After Visit Summary  Diet Recommendations at Discharge:  Diet - Heart healthy diet    Instructions for any Catheters / Lines Present at Discharge (including removal date, if applicable): NA    Significant Findings / Test Results:   · KAREN    Incidental Findings:   · See above     Test Results Pending at Discharge (will require follow up):   · NA     Outpatient Tests Requested:  · NA    Complications:  NA    Hospital Course: Alonzo Rodriguez is a 80 y o  female patient who originally presented to the hospital on 7/28/2020 due to failure to thrive secondary to reported poor po intake by her daughter  Despite these reports, patient had strong appetite while admitted and ate the majority of her meals  Also cleared by speech and swallow for PO intake  Likely patient is unable to be cared for by her daughter and now will require placement  Placed at [facility]       Condition at Discharge: fair     Discharge Day Visit / Exam:     Subjective:  Patient has no complaints  Vitals: Blood Pressure: (!) 135/48 (07/30/20 1521)  Pulse: 71 (07/29/20 0744)  Temperature: 98 8 °F (37 1 °C) (07/30/20 1521)  Temp Source: Oral (07/28/20 2056)  Respirations: 18 (07/30/20 1521)  Height: 5' 2 5" (07/28/20 2056)  Weight - Scale: 35 3 kg (77 lb 13 2 oz) (07/28/20 2056)  SpO2: 97 % (07/29/20 0744)      Goals of Care Discussions:  · Code Status at Discharge: Prior  · Were there any Goals of Care Discussions during Hospitalization?: No  · Results of any General Goals of Care Discussions: na   · POLST Completed: No   · If POLST Completed, Summary of POLST Agreement Provided Here: na   · OK to Rehospitalize if Needed? No    Discharge instructions/Information to patient and family:   See after visit summary section titled Discharge Instructions for information provided to patient and family  Planned Readmission: none      Discharge Statement:  I spent 30 minutes discharging the patient  This time was spent on the day of discharge  I had direct contact with the patient on the day of discharge  Greater than 50% of the total time was spent examining patient, answering all patient questions, arranging and discussing plan of care with patient as well as directly providing post-discharge instructions  Additional time then spent on discharge activities      ** Please Note: This note has been constructed using a voice recognition system **

## 2020-08-03 NOTE — ASSESSMENT & PLAN NOTE
Malnutrition Findings:   Malnutrition type: Chronic illness  Degree of Malnutrition: Other severe protein calorie malnutrition    BMI Findings:  BMI Classifications: Underweight < 18 5     Body mass index is 14 01 kg/m²       See plan for failure to thrive

## 2020-08-03 NOTE — ASSESSMENT & PLAN NOTE
· Although patient presented with poor PO intake and failure to thrive, but patient had good appetite while admitted   · Could be that the patient's daughter is having difficulty caring for her  · Will discharge to nursing home  · Status post palliative care consult, see note for further recommendations

## 2020-08-20 ENCOUNTER — OFFICE VISIT (OUTPATIENT)
Dept: FAMILY MEDICINE CLINIC | Facility: MEDICAL CENTER | Age: 85
End: 2020-08-20
Payer: MEDICARE

## 2020-08-20 VITALS
HEIGHT: 63 IN | TEMPERATURE: 97.2 F | HEART RATE: 87 BPM | SYSTOLIC BLOOD PRESSURE: 130 MMHG | DIASTOLIC BLOOD PRESSURE: 62 MMHG | BODY MASS INDEX: 14.18 KG/M2 | WEIGHT: 80 LBS

## 2020-08-20 DIAGNOSIS — G30.1 LATE ONSET ALZHEIMER'S DISEASE WITHOUT BEHAVIORAL DISTURBANCE (HCC): ICD-10-CM

## 2020-08-20 DIAGNOSIS — F02.80 LATE ONSET ALZHEIMER'S DISEASE WITHOUT BEHAVIORAL DISTURBANCE (HCC): ICD-10-CM

## 2020-08-20 DIAGNOSIS — H61.23 BILATERAL IMPACTED CERUMEN: Primary | ICD-10-CM

## 2020-08-20 DIAGNOSIS — F17.200 TOBACCO DEPENDENCE: ICD-10-CM

## 2020-08-20 DIAGNOSIS — I10 ESSENTIAL HYPERTENSION: ICD-10-CM

## 2020-08-20 PROCEDURE — 69210 REMOVE IMPACTED EAR WAX UNI: CPT | Performed by: FAMILY MEDICINE

## 2020-08-20 PROCEDURE — 99214 OFFICE O/P EST MOD 30 MIN: CPT | Performed by: FAMILY MEDICINE

## 2020-08-21 NOTE — PROGRESS NOTES
Vanesa Galindo is accompanied by her daughter today  She has not been here in well over a year  She has had multiple hospitalizations related to her dementia including dehydration and confusion  She was recently hospitalized because she claimed that she could not see  She has had be mildly dehydrated  See hospital discharge summary  She is also noted to have malnutrition  Daughter notes that her dementia continues to progress  She can sleep for 24 hours at a time or not at all  She tries to wander  She refuses to drink water because she said she is allergic to it  All she will drink his coffee  She has days she does need at all  Can become very irritable and difficult  O: /62 (BP Location: Left arm, Patient Position: Sitting, Cuff Size: Adult)   Pulse 87   Temp (!) 97 2 °F (36 2 °C)   Ht 5' 2 5" (1 588 m)   Wt 36 3 kg (80 lb)   BMI 14 40 kg/m²    Physical Exam  Constitutional:       General: She is not in acute distress  Appearance: She is well-developed  Neck:      Thyroid: No thyromegaly  Vascular: No carotid bruit  Cardiovascular:      Rate and Rhythm: Normal rate and regular rhythm  Heart sounds: Normal heart sounds  Pulmonary:      Effort: Pulmonary effort is normal       Breath sounds: Normal breath sounds  Abdominal:      General: Bowel sounds are normal       Palpations: Abdomen is soft  There is no hepatomegaly or mass  Tenderness: There is no abdominal tenderness  Lymphadenopathy:      Cervical: No cervical adenopathy  Both TM's occluded by cerumen  Irrigated   She is alert but no oriented  Answers yes or no questions    Assessment  1  Dementia-progressing  Doubt daughter can care for her at home safely ; refusal to eat, wandering, et  Discussed nursing facility placement  2  Cerumen impaction  3  HTN-controlled    Plan  AS above    Daughter will look into placement options

## 2020-08-29 ENCOUNTER — HOSPITAL ENCOUNTER (INPATIENT)
Facility: HOSPITAL | Age: 85
LOS: 4 days | Discharge: NURSING, REHAB OR LONG-TERM CARE | DRG: 640 | End: 2020-09-02
Attending: EMERGENCY MEDICINE | Admitting: INTERNAL MEDICINE
Payer: MEDICARE

## 2020-08-29 ENCOUNTER — APPOINTMENT (EMERGENCY)
Dept: CT IMAGING | Facility: HOSPITAL | Age: 85
DRG: 640 | End: 2020-08-29
Payer: MEDICARE

## 2020-08-29 ENCOUNTER — APPOINTMENT (EMERGENCY)
Dept: RADIOLOGY | Facility: HOSPITAL | Age: 85
DRG: 640 | End: 2020-08-29
Payer: MEDICARE

## 2020-08-29 DIAGNOSIS — F41.9 ANXIETY AND DEPRESSION: ICD-10-CM

## 2020-08-29 DIAGNOSIS — I10 ESSENTIAL HYPERTENSION: ICD-10-CM

## 2020-08-29 DIAGNOSIS — R62.7 FAILURE TO THRIVE IN ADULT: ICD-10-CM

## 2020-08-29 DIAGNOSIS — G30.9 ALZHEIMER'S DEMENTIA WITHOUT BEHAVIORAL DISTURBANCE, UNSPECIFIED TIMING OF DEMENTIA ONSET (HCC): ICD-10-CM

## 2020-08-29 DIAGNOSIS — R53.1 GENERALIZED WEAKNESS: Primary | ICD-10-CM

## 2020-08-29 DIAGNOSIS — E43 SEVERE PROTEIN-CALORIE MALNUTRITION (HCC): ICD-10-CM

## 2020-08-29 DIAGNOSIS — F02.80 ALZHEIMER'S DEMENTIA WITHOUT BEHAVIORAL DISTURBANCE, UNSPECIFIED TIMING OF DEMENTIA ONSET (HCC): ICD-10-CM

## 2020-08-29 DIAGNOSIS — R19.7 DIARRHEA: ICD-10-CM

## 2020-08-29 DIAGNOSIS — F03.90 DEMENTIA (HCC): ICD-10-CM

## 2020-08-29 DIAGNOSIS — F32.A ANXIETY AND DEPRESSION: ICD-10-CM

## 2020-08-29 PROBLEM — R06.89 CHRONIC RESPIRATORY INSUFFICIENCY: Status: ACTIVE | Noted: 2020-08-29

## 2020-08-29 LAB
ALBUMIN SERPL BCP-MCNC: 3 G/DL (ref 3.5–5)
ALP SERPL-CCNC: 52 U/L (ref 46–116)
ALT SERPL W P-5'-P-CCNC: 11 U/L (ref 12–78)
ANION GAP SERPL CALCULATED.3IONS-SCNC: 10 MMOL/L (ref 4–13)
AST SERPL W P-5'-P-CCNC: 22 U/L (ref 5–45)
BASOPHILS # BLD AUTO: 0.03 THOUSANDS/ΜL (ref 0–0.1)
BASOPHILS NFR BLD AUTO: 0 % (ref 0–1)
BILIRUB DIRECT SERPL-MCNC: 0.03 MG/DL (ref 0–0.2)
BILIRUB SERPL-MCNC: 0.3 MG/DL (ref 0.2–1)
BILIRUB UR QL STRIP: NEGATIVE
BUN SERPL-MCNC: 20 MG/DL (ref 5–25)
CALCIUM SERPL-MCNC: 8.5 MG/DL (ref 8.3–10.1)
CHLORIDE SERPL-SCNC: 106 MMOL/L (ref 100–108)
CLARITY UR: CLEAR
CO2 SERPL-SCNC: 26 MMOL/L (ref 21–32)
COLOR UR: YELLOW
CREAT SERPL-MCNC: 0.97 MG/DL (ref 0.6–1.3)
EOSINOPHIL # BLD AUTO: 0.26 THOUSAND/ΜL (ref 0–0.61)
EOSINOPHIL NFR BLD AUTO: 3 % (ref 0–6)
ERYTHROCYTE [DISTWIDTH] IN BLOOD BY AUTOMATED COUNT: 18.6 % (ref 11.6–15.1)
GFR SERPL CREATININE-BSD FRML MDRD: 52 ML/MIN/1.73SQ M
GLUCOSE SERPL-MCNC: 94 MG/DL (ref 65–140)
GLUCOSE UR STRIP-MCNC: NEGATIVE MG/DL
HCT VFR BLD AUTO: 37.9 % (ref 34.8–46.1)
HGB BLD-MCNC: 11.1 G/DL (ref 11.5–15.4)
HGB UR QL STRIP.AUTO: NEGATIVE
IMM GRANULOCYTES # BLD AUTO: 0.04 THOUSAND/UL (ref 0–0.2)
IMM GRANULOCYTES NFR BLD AUTO: 0 % (ref 0–2)
KETONES UR STRIP-MCNC: NEGATIVE MG/DL
LEUKOCYTE ESTERASE UR QL STRIP: NEGATIVE
LYMPHOCYTES # BLD AUTO: 1.03 THOUSANDS/ΜL (ref 0.6–4.47)
LYMPHOCYTES NFR BLD AUTO: 11 % (ref 14–44)
MCH RBC QN AUTO: 26.4 PG (ref 26.8–34.3)
MCHC RBC AUTO-ENTMCNC: 29.3 G/DL (ref 31.4–37.4)
MCV RBC AUTO: 90 FL (ref 82–98)
MONOCYTES # BLD AUTO: 0.63 THOUSAND/ΜL (ref 0.17–1.22)
MONOCYTES NFR BLD AUTO: 7 % (ref 4–12)
NEUTROPHILS # BLD AUTO: 7.71 THOUSANDS/ΜL (ref 1.85–7.62)
NEUTS SEG NFR BLD AUTO: 79 % (ref 43–75)
NITRITE UR QL STRIP: NEGATIVE
NRBC BLD AUTO-RTO: 0 /100 WBCS
PH UR STRIP.AUTO: 6 [PH]
PLATELET # BLD AUTO: 322 THOUSANDS/UL (ref 149–390)
PLATELET # BLD AUTO: 394 THOUSANDS/UL (ref 149–390)
PMV BLD AUTO: 9 FL (ref 8.9–12.7)
PMV BLD AUTO: 9.4 FL (ref 8.9–12.7)
POTASSIUM SERPL-SCNC: 5 MMOL/L (ref 3.5–5.3)
PROCALCITONIN SERPL-MCNC: 0.12 NG/ML
PROT SERPL-MCNC: 7.4 G/DL (ref 6.4–8.2)
PROT UR STRIP-MCNC: NEGATIVE MG/DL
RBC # BLD AUTO: 4.21 MILLION/UL (ref 3.81–5.12)
SARS-COV-2 RNA RESP QL NAA+PROBE: NEGATIVE
SODIUM SERPL-SCNC: 142 MMOL/L (ref 136–145)
SP GR UR STRIP.AUTO: 1.02 (ref 1–1.03)
TROPONIN I SERPL-MCNC: <0.02 NG/ML
TSH SERPL DL<=0.05 MIU/L-ACNC: 0.79 UIU/ML (ref 0.36–3.74)
UROBILINOGEN UR QL STRIP.AUTO: 0.2 E.U./DL
WBC # BLD AUTO: 9.7 THOUSAND/UL (ref 4.31–10.16)

## 2020-08-29 PROCEDURE — 84443 ASSAY THYROID STIM HORMONE: CPT | Performed by: INTERNAL MEDICINE

## 2020-08-29 PROCEDURE — 96361 HYDRATE IV INFUSION ADD-ON: CPT

## 2020-08-29 PROCEDURE — 93005 ELECTROCARDIOGRAM TRACING: CPT

## 2020-08-29 PROCEDURE — 85049 AUTOMATED PLATELET COUNT: CPT | Performed by: INTERNAL MEDICINE

## 2020-08-29 PROCEDURE — 70450 CT HEAD/BRAIN W/O DYE: CPT

## 2020-08-29 PROCEDURE — 99285 EMERGENCY DEPT VISIT HI MDM: CPT

## 2020-08-29 PROCEDURE — G1004 CDSM NDSC: HCPCS

## 2020-08-29 PROCEDURE — 99285 EMERGENCY DEPT VISIT HI MDM: CPT | Performed by: PHYSICIAN ASSISTANT

## 2020-08-29 PROCEDURE — 80048 BASIC METABOLIC PNL TOTAL CA: CPT | Performed by: PHYSICIAN ASSISTANT

## 2020-08-29 PROCEDURE — 84145 PROCALCITONIN (PCT): CPT | Performed by: INTERNAL MEDICINE

## 2020-08-29 PROCEDURE — 84484 ASSAY OF TROPONIN QUANT: CPT | Performed by: PHYSICIAN ASSISTANT

## 2020-08-29 PROCEDURE — 81003 URINALYSIS AUTO W/O SCOPE: CPT | Performed by: PHYSICIAN ASSISTANT

## 2020-08-29 PROCEDURE — 87635 SARS-COV-2 COVID-19 AMP PRB: CPT | Performed by: INTERNAL MEDICINE

## 2020-08-29 PROCEDURE — 71045 X-RAY EXAM CHEST 1 VIEW: CPT

## 2020-08-29 PROCEDURE — 99222 1ST HOSP IP/OBS MODERATE 55: CPT | Performed by: INTERNAL MEDICINE

## 2020-08-29 PROCEDURE — 36415 COLL VENOUS BLD VENIPUNCTURE: CPT | Performed by: PHYSICIAN ASSISTANT

## 2020-08-29 PROCEDURE — 80076 HEPATIC FUNCTION PANEL: CPT | Performed by: PHYSICIAN ASSISTANT

## 2020-08-29 PROCEDURE — 96360 HYDRATION IV INFUSION INIT: CPT

## 2020-08-29 PROCEDURE — 85025 COMPLETE CBC W/AUTO DIFF WBC: CPT | Performed by: PHYSICIAN ASSISTANT

## 2020-08-29 RX ORDER — ACETAMINOPHEN 325 MG/1
650 TABLET ORAL EVERY 6 HOURS PRN
Status: DISCONTINUED | OUTPATIENT
Start: 2020-08-29 | End: 2020-09-02 | Stop reason: HOSPADM

## 2020-08-29 RX ORDER — SIMETHICONE 80 MG
80 TABLET,CHEWABLE ORAL 4 TIMES DAILY PRN
Status: DISCONTINUED | OUTPATIENT
Start: 2020-08-29 | End: 2020-09-02 | Stop reason: HOSPADM

## 2020-08-29 RX ORDER — DONEPEZIL HYDROCHLORIDE 5 MG/1
10 TABLET, FILM COATED ORAL DAILY
Status: DISCONTINUED | OUTPATIENT
Start: 2020-08-30 | End: 2020-09-02 | Stop reason: HOSPADM

## 2020-08-29 RX ORDER — ONDANSETRON 2 MG/ML
4 INJECTION INTRAMUSCULAR; INTRAVENOUS EVERY 6 HOURS PRN
Status: DISCONTINUED | OUTPATIENT
Start: 2020-08-29 | End: 2020-09-02 | Stop reason: HOSPADM

## 2020-08-29 RX ORDER — SODIUM CHLORIDE, SODIUM GLUCONATE, SODIUM ACETATE, POTASSIUM CHLORIDE, MAGNESIUM CHLORIDE, SODIUM PHOSPHATE, DIBASIC, AND POTASSIUM PHOSPHATE .53; .5; .37; .037; .03; .012; .00082 G/100ML; G/100ML; G/100ML; G/100ML; G/100ML; G/100ML; G/100ML
75 INJECTION, SOLUTION INTRAVENOUS CONTINUOUS
Status: DISPENSED | OUTPATIENT
Start: 2020-08-29 | End: 2020-08-30

## 2020-08-29 RX ORDER — SERTRALINE HYDROCHLORIDE 25 MG/1
25 TABLET, FILM COATED ORAL DAILY
Status: DISCONTINUED | OUTPATIENT
Start: 2020-08-30 | End: 2020-09-02 | Stop reason: HOSPADM

## 2020-08-29 RX ORDER — AMLODIPINE BESYLATE 5 MG/1
5 TABLET ORAL DAILY
Status: DISCONTINUED | OUTPATIENT
Start: 2020-08-30 | End: 2020-09-02 | Stop reason: HOSPADM

## 2020-08-29 RX ORDER — IPRATROPIUM BROMIDE AND ALBUTEROL SULFATE 2.5; .5 MG/3ML; MG/3ML
3 SOLUTION RESPIRATORY (INHALATION) EVERY 4 HOURS PRN
Status: DISCONTINUED | OUTPATIENT
Start: 2020-08-29 | End: 2020-09-02 | Stop reason: HOSPADM

## 2020-08-29 RX ADMIN — SODIUM CHLORIDE 1000 ML: 0.9 INJECTION, SOLUTION INTRAVENOUS at 11:02

## 2020-08-29 RX ADMIN — SODIUM CHLORIDE, SODIUM GLUCONATE, SODIUM ACETATE, POTASSIUM CHLORIDE, MAGNESIUM CHLORIDE, SODIUM PHOSPHATE, DIBASIC, AND POTASSIUM PHOSPHATE 75 ML/HR: .53; .5; .37; .037; .03; .012; .00082 INJECTION, SOLUTION INTRAVENOUS at 16:49

## 2020-08-29 NOTE — ASSESSMENT & PLAN NOTE
Malnutrition Findings:           BMI Findings: Body mass index is 14 84 kg/m²  In poor health in general   Her BMI is less than 15  Nutritional consultation and regular diet

## 2020-08-29 NOTE — ASSESSMENT & PLAN NOTE
She is 80years old  Has been going downhill as per patient's daughter    Encouraged to eat better as below

## 2020-08-29 NOTE — PLAN OF CARE
Problem: Potential for Falls  Goal: Patient will remain free of falls  Description: INTERVENTIONS:  - Assess patient frequently for physical needs  -  Identify cognitive and physical deficits and behaviors that affect risk of falls    -  [place] fall precautions as indicated by assessment   - Educate patient/family on patient safety including physical limitations  - Instruct patient to call for assistance with activity based on assessment  - Modify environment to reduce risk of injury  - Consider OT/PT consult to assist with strengthening/mobility  Outcome: Progressing     Problem: Prexisting or High Potential for Compromised Skin Integrity  Goal: Skin integrity is maintained or improved  Description: INTERVENTIONS:  - Identify patients at risk for skin breakdown  - Assess and monitor skin integrity  - Assess and monitor nutrition and hydration status  - Monitor labs   - Assess for incontinence   - Turn and reposition patient  - Assist with mobility/ambulation  - Relieve pressure over bony prominences  - Avoid friction and shearing  - Provide appropriate hygiene as needed including keeping skin clean and dry  - Evaluate need for skin moisturizer/barrier cream  - Collaborate with interdisciplinary team   - Patient/family teaching  - Consider wound care consult   Outcome: Progressing

## 2020-08-29 NOTE — H&P
History and Physical - [facility] Internal Medicine    Patient Information: Vinicio Vazquez 80 y o  female MRN: [id number]  Unit/Bed#: ED 25 Encounter: [id number]  Admitting Physician: Arlette Ca MD  PCP: Aidan Dejesus MD  Date of Admission:  08/29/20    Assessment/Plan:      * Weakness/ambulatory dysfunction  Assessment & Plan  Multifactorial including she had an accident  She is not complaining of any abdominal pain or diarrhea to me  As per patient, once in while she is incontinent of the stools  However, as per patient's daughter, this has been more frequent  PT/OT  Case management follow-up for possible placement    Anxiety and depression  Assessment & Plan  Continue with home medications/Zoloft    Chronic respiratory insufficiency  Assessment & Plan  Does not appear to be in any significant respiratory distress  Has history of tobacco abuse  Would give her breathing treatments as needed only  Essential hypertension  Assessment & Plan  She is on Norvasc 5 mg daily at home  His would be continued    Alzheimer's dementia without behavioral disturbance (HCC)  Assessment & Plan  Continue with 10 mg of Aricept daily    Severe protein-calorie malnutrition ([address] )  Assessment & Plan  Malnutrition Findings:           BMI Findings: Body mass index is 14 84 kg/m²  In poor health in general   Her BMI is less than 15  Nutritional consultation and regular diet  Failure to thrive in adult  Assessment & Plan  She is 80years old  Has been going downhill as per patient's daughter    Encouraged to eat better as below        Present on Admission:   Weakness/ambulatory dysfunction   Failure to thrive in adult   Severe protein-calorie malnutrition ([address] )   Alzheimer's dementia without behavioral disturbance ([address] )   Essential hypertension   Chronic respiratory insufficiency   Anxiety and depression        VTE Prophylaxis: Enoxaparin (Lovenox)  / sequential compression device   Code Status: DNR/DNI-as I discussed with patient's daughter  POLST: There is no POLST form on file for this patient (pre-hospital)    Anticipated Length of Stay:  Patient will be admitted on an Inpatient basis with an anticipated length of stay of  more than 2 midnights  Justification for Hospital Stay:  Ambulatory dysfunction and need for rehab/placement    Total Time for Visit, including Counseling / Coordination of Care: 45+ minutes  Greater than 50% of this total time spent on direct patient counseling and coordination of care  Chief Complaint:   Severe weakness    History of Present Illness: Milton Finn is a 80 y o  female who presents with severe weakness  As per patient's daughter with home I spoke over the phone, patient to add large bowel movement today and stools were all or  Then she was so weak that she could not stand on her feet  Lately, she has been having more and more fecal incontinence  As per patient, this happens here and there although not that frequent  Patient states that she eats fine although per daughter, she has not been eating much  Patient weighs around 85+ lb most of her life  Patient states that her legs are weak and has some pain in her legs  She per se denies any abdominal pain to me  Denies any chest pain  Denies any abdominal pain  No nausea vomiting  No fever or chills             Review of Systems    All systems are reviewed  Positive as per history of presenting illness  Patient answered no to all other questions  Past Medical and Surgical History:     Past Medical History:   Diagnosis Date    Dementia ([facility] )     Hypertension        History reviewed  No pertinent surgical history  Meds/Allergies:    Prior to Admission medications    As below     Norvasc 5 mg daily, Aricept 10 mg daily  Inhaler as needed for shortness of breath, is Zoloft 25 mg daily    This is from her or records as patient appears to have 2 different medical records that need to be merged    Allergies: No Known Allergies    Social History:     Marital Status: Unknown   Occupation:  Patient currently not working  Patient Pre-hospital Living Situation:  With her daughter  Patient Pre-hospital Level of Mobility:  Independent  Patient Pre-hospital Diet Restrictions:  None  Substance Use History:   Social History     Substance and Sexual Activity   Alcohol Use Never    Frequency: Never    Binge frequency: Never     Social History     Tobacco Use   Smoking Status Never Smoker   Smokeless Tobacco Never Used     Social History     Substance and Sexual Activity   Drug Use Never       Family History:    Reviewed and not contributory to her current illness        Physical Exam      Vitals:   Blood Pressure: 111/53 (08/29/20 1330)  Pulse: 67 (08/29/20 1330)  Temperature: 98 2 °F (36 8 °C) (08/29/20 1104)  Temp Source: Oral (08/29/20 1104)  Respirations: 16 (08/29/20 1330)  Height: 5' 2 5" (158 8 cm) (08/29/20 1104)  Weight - Scale: 37 4 kg (82 lb 7 2 oz) (08/29/20 1104)  SpO2: 96 % (08/29/20 1330)    Vital signs are reviewed as above  Constitutional:  Cachectic 80years old female who is lying in stretcher in the ER  She is quite feisty  Eyes: EOM grossly intact  Conjunctivae slightly pale  Patient has anicteric sclera  HENT: Oropharynx are slightly dry  Did not notice any significant lesions on the tongue  Head normocephalic  Neck: Neck is supple  I was not able to visualize any JVD at 90°  There is no significant lymphadenopathy  I also did not notice any significant thyromegaly  She has easily visible muscle tendons/bones of her neck and also clavicle   Cardiac: I did not hear any rubs or gallop  Patient appears to be in sinus rhythm  Respiratory: Patient not in significant respiratory distress  Air entry in general is fair  GI: Abdomen is soft  It is grossly nontender  Bowel sounds are adequate  I was not able to appreciate any hepatosplenomegaly  Neurologic:  Patient is awake and alert  Neurological examination is grossly intact  No obvious focal neurological deficit noticed  She is feeling weak  Skin: Skin is warm and dry  Has almost no subcutaneous fat  Psychiatric: Mood and affect are pleasant  Musculoskeletal  Patient moving all extremities while in bed, however, she has chronic arthritic joints   Has easily visible muscle tendons/bones/ribs  Also there is muscle wasting from temporal areas  Clavicles are obvious  She only weighs about 80 lb  Extremities: Patient has no significant cyanosis, clubbing, or lower extremity edema           Additional Data:     Lab Results: I have personally reviewed pertinent reports  Results from last 7 days   Lab Units 08/29/20  1100   WBC Thousand/uL 9 70   HEMOGLOBIN g/dL 11 1*   HEMATOCRIT % 37 9   PLATELETS Thousands/uL 394*   NEUTROS PCT % 79*   LYMPHS PCT % 11*   MONOS PCT % 7   EOS PCT % 3     Results from last 7 days   Lab Units 08/29/20  1100   POTASSIUM mmol/L 5 0   CHLORIDE mmol/L 106   CO2 mmol/L 26   BUN mg/dL 20   CREATININE mg/dL 0 97   CALCIUM mg/dL 8 5   ALK PHOS U/L 52   ALT U/L 11*   AST U/L 22           Imaging: I have personally reviewed pertinent reports  Ct Head Without Contrast    Result Date: 8/29/2020  Narrative: CT BRAIN - WITHOUT CONTRAST INDICATION:   weakness  COMPARISON:  None  TECHNIQUE:  CT examination of the brain was performed  In addition to axial images, sagittal and coronal 2D reformatted images were created and submitted for interpretation  Radiation dose length product (DLP) for this visit:  788 mGy-cm   This examination, like all CT scans performed in the [facility], was performed utilizing techniques to minimize radiation dose exposure, including the use of iterative reconstruction and automated exposure control  IMAGE QUALITY:  Diagnostic   FINDINGS: PARENCHYMA: Decreased attenuation is noted in periventricular and subcortical white matter demonstrating an appearance that is statistically most likely to represent moderate microangiopathic change  No CT signs of acute infarction  No intracranial mass, mass effect or midline shift  No acute parenchymal hemorrhage  VENTRICLES AND EXTRA-AXIAL SPACES:  Normal for the patient's age  VISUALIZED ORBITS AND PARANASAL SINUSES:  Unremarkable  CALVARIUM AND EXTRACRANIAL SOFT TISSUES:  Normal      Impression: No acute intracranial abnormality  Workstation performed: [id number]       EKG, Pathology, and Other Studies Reviewed on Admission:   · EKG:  Heart rate in 60s  Patient has sinus rhythm  Do not see any acute changes at all    Allscripts Records Reviewed: No     ** Please Note: Dragon 360 Dictation voice to text software may have been used in the creation of this document   **

## 2020-08-29 NOTE — ED PROVIDER NOTES
History  Chief Complaint   Patient presents with    Weakness - Generalized    Diarrhea     80-year-old female with past medical history significant for hypertension and dementia presents to the emergency department via EMS from home where she lives with her daughter with chief complaint of increasing generalized weakness and diarrhea and possible dehdyration  Onset of symptoms  Patient reports she has been feeling weak "for some time now" but worse over the past 2 days  Location of symptoms:  Described as generalized weakness - no isolated or focal weakness  Quality reported as feel generally not having her normal strength  Severity reported as moderate  Associated symptoms: patient reports she has been having loose stools recently - denies vomiting, reports poor oral intake recently  denies chest pain, denies syncope  Positive for SOB but patient states she always is short of breath  Modifiers:  Walking, and trying to get to the bathroom are what patient reports are making her symptoms worse  Reviewed past visits via [facility], patient last seen and admitted to the hospital on 7/28/2020 for generalized failure to thrive and weakness  History provided by:  Patient and EMS personnel  History limited by:  Dementia   used: No        None       Past Medical History:   Diagnosis Date    Dementia [address] [address] )     Hypertension        History reviewed  No pertinent surgical history  History reviewed  No pertinent family history  I have reviewed and agree with the history as documented  E-Cigarette/Vaping    E-Cigarette Use Never User      E-Cigarette/Vaping Substances     Social History     Tobacco Use    Smoking status: Never Smoker    Smokeless tobacco: Never Used   Substance Use Topics    Alcohol use: Never     Frequency: Never     Binge frequency: Never    Drug use: Never       Review of Systems   Unable to perform ROS: Dementia   Respiratory: Positive for shortness of breath  Gastrointestinal: Positive for diarrhea  Neurological: Positive for weakness  All other systems reviewed and are negative  Physical Exam  Physical Exam  Vitals signs and nursing note reviewed  Constitutional:       General: She is not in acute distress  Appearance: Normal appearance  She is underweight  She is not diaphoretic  HENT:      Head: Normocephalic and atraumatic  Right Ear: Tympanic membrane and external ear normal       Left Ear: Tympanic membrane and external ear normal       Nose: Nose normal  No congestion or rhinorrhea  Mouth/Throat:      Mouth: Mucous membranes are dry  Pharynx: Oropharynx is clear  No oropharyngeal exudate or posterior oropharyngeal erythema  Eyes:      General: No scleral icterus  Right eye: No discharge  Left eye: No discharge  Extraocular Movements: Extraocular movements intact  Conjunctiva/sclera: Conjunctivae normal       Pupils: Pupils are equal, round, and reactive to light  Neck:      Musculoskeletal: Normal range of motion and neck supple  No neck rigidity or muscular tenderness  Vascular: No JVD  Trachea: No tracheal deviation  Cardiovascular:      Rate and Rhythm: Normal rate and regular rhythm  Pulses: Normal pulses  Pulmonary:      Effort: Pulmonary effort is normal  No respiratory distress  Breath sounds: Normal breath sounds  No wheezing or rhonchi  Chest:      Chest wall: No tenderness  Abdominal:      General: Abdomen is flat  Bowel sounds are normal  There is no distension  There are no signs of injury  Palpations: Abdomen is soft  There is no mass or pulsatile mass  Tenderness: There is no abdominal tenderness  There is no guarding or rebound  Hernia: No hernia is present  Musculoskeletal: Normal range of motion  General: No swelling, tenderness or deformity  Right lower leg: No edema  Left lower leg: No edema     Lymphadenopathy: Cervical: No cervical adenopathy  Skin:     General: Skin is warm and dry  Capillary Refill: Capillary refill takes less than 2 seconds  Coloration: Skin is pale  Skin is not jaundiced  Findings: No erythema or rash  Neurological:      General: No focal deficit present  Mental Status: She is alert  She is disoriented  Cranial Nerves: No cranial nerve deficit  Motor: No weakness or abnormal muscle tone  Coordination: Coordination normal    Psychiatric:         Attention and Perception: Attention normal          Mood and Affect: Mood normal          Speech: Speech normal          Cognition and Memory: Cognition normal  Memory is impaired  Vital Signs  ED Triage Vitals [08/29/20 1104]   Temperature Pulse Respirations Blood Pressure SpO2   98 2 °F (36 8 °C) 59 18 127/56 94 %      Temp Source Heart Rate Source Patient Position - Orthostatic VS BP Location FiO2 (%)   Oral -- -- -- --      Pain Score       --           Vitals:    08/29/20 1130 08/29/20 1215 08/29/20 1300 08/29/20 1330   BP: 122/57 111/52 111/54 111/53   Pulse: 64 58 60 67         Visual Acuity      ED Medications  Medications   sodium chloride 0 9 % bolus 1,000 mL (1,000 mL Intravenous New Bag 8/29/20 1102)       Diagnostic Studies  Results Reviewed     Procedure Component Value Units Date/Time    [id number] Collected:  08/29/20 1358    Lab Status:   In process Specimen:  Nares from Nose Updated:  08/29/20 1403    UA w Reflex to Microscopic w Reflex to Culture [[id number]] Collected:  08/29/20 1248    Lab Status:  Final result Specimen:  Urine, Straight Cath Updated:  08/29/20 1257     Color, UA Yellow     Clarity, UA Clear     Specific Gravity, UA 1 025     pH, UA 6 0     Leukocytes, UA Negative     Nitrite, UA Negative     Protein, UA Negative mg/dl      Glucose, UA Negative mg/dl      Ketones, UA Negative mg/dl      Urobilinogen, UA 0 2 E U /dl      Bilirubin, UA Negative     Blood, UA Negative    Stool Enteric Bacterial Panel by PCR [[id number]]     Lab Status:  No result Specimen:  Stool     Basic metabolic panel [[id number]] Collected:  08/29/20 1100    Lab Status:  Final result Specimen:  Blood from Arm, Right Updated:  08/29/20 1211     Sodium 142 mmol/L      Potassium 5 0 mmol/L      Chloride 106 mmol/L      CO2 26 mmol/L      ANION GAP 10 mmol/L      BUN 20 mg/dL      Creatinine 0 97 mg/dL      Glucose 94 mg/dL      Calcium 8 5 mg/dL      eGFR 52 ml/min/1 73sq m     Narrative:       [place] guidelines for Chronic Kidney Disease (CKD):     Stage 1 with normal or high GFR (GFR > 90 mL/min/1 73 square meters)    Stage 2 Mild CKD (GFR = 60-89 mL/min/1 73 square meters)    Stage 3A Moderate CKD (GFR = 45-59 mL/min/1 73 square meters)    Stage 3B Moderate CKD (GFR = 30-44 mL/min/1 73 square meters)    Stage 4 Severe CKD (GFR = 15-29 mL/min/1 73 square meters)    Stage 5 End Stage CKD (GFR <15 mL/min/1 73 square meters)  Note: GFR calculation is accurate only with a steady state creatinine    Hepatic function panel [[id number]]  (Abnormal) Collected:  08/29/20 1100    Lab Status:  Final result Specimen:  Blood from Arm, Right Updated:  08/29/20 1211     Total Bilirubin 0 30 mg/dL      Bilirubin, Direct 0 03 mg/dL      Alkaline Phosphatase 52 U/L      AST 22 U/L      ALT 11 U/L      Total Protein 7 4 g/dL      Albumin 3 0 g/dL     Troponin I [[id number]]  (Normal) Collected:  08/29/20 1100    Lab Status:  Final result Specimen:  Blood from Arm, Right Updated:  08/29/20 1132     Troponin I <0 02 ng/mL     CBC and differential [[id number]]  (Abnormal) Collected:  08/29/20 1100    Lab Status:  Final result Specimen:  Blood from Arm, Right Updated:  08/29/20 1110     WBC 9 70 Thousand/uL      RBC 4 21 Million/uL      Hemoglobin 11 1 g/dL      Hematocrit 37 9 %      MCV 90 fL      MCH 26 4 pg      MCHC 29 3 g/dL      RDW 18 6 %      MPV 9 4 fL Platelets 745 Thousands/uL      nRBC 0 /100 WBCs      Neutrophils Relative 79 %      Immat GRANS % 0 %      Lymphocytes Relative 11 %      Monocytes Relative 7 %      Eosinophils Relative 3 %      Basophils Relative 0 %      Neutrophils Absolute 7 71 Thousands/µL      Immature Grans Absolute 0 04 Thousand/uL      Lymphocytes Absolute 1 03 Thousands/µL      Monocytes Absolute 0 63 Thousand/µL      Eosinophils Absolute 0 26 Thousand/µL      Basophils Absolute 0 03 Thousands/µL                  CT head without contrast   Final Result by Le Greer MD (08/29 1217)      No acute intracranial abnormality  Workstation performed: [id number]         XR chest 1 view portable    (Results Pending)              Procedures  ECG 12 Lead Documentation Only    Date/Time: 8/29/2020 11:03 AM  Performed by: Mely Segura PA-C  Authorized by: Mely Segura PA-C     Indications / Diagnosis:  Weakness/sob  ECG reviewed by me, the ED Provider: yes    Patient location:  ED  Previous ECG:     Previous ECG:  Unavailable    Comparison to cardiac monitor: Yes    Interpretation:     Interpretation: normal    Rate:     ECG rate:  60    ECG rate assessment: normal    Rhythm:     Rhythm: sinus rhythm    Ectopy:     Ectopy: none    QRS:     QRS axis:  Normal    QRS intervals:  Normal  Conduction:     Conduction: normal    ST segments:     ST segments:  Normal  T waves:     T waves: normal               ED Course       US AUDIT      Most Recent Value   Initial Alcohol Screen: US AUDIT-C    1  How often do you have a drink containing alcohol?  0 Filed at: 08/29/2020 1103   2  How many drinks containing alcohol do you have on a typical day you are drinking? 0 Filed at: 08/29/2020 1103   3a  Male UNDER 65: How often do you have five or more drinks on one occasion? 0 Filed at: 08/29/2020 1103   3b  FEMALE Any Age, or MALE 65+: How often do you have 4 or more drinks on one occassion?   0 Filed at: 08/29/2020 1103   Audit-C Score 0 Filed at: 08/29/2020 1103            HEART Risk Score      Most Recent Value   Heart Score Risk Calculator   History  1 Filed at: 08/29/2020 1235   ECG  0 Filed at: 08/29/2020 1235   Age  2 Filed at: 08/29/2020 1235   Risk Factors  1 Filed at: 08/29/2020 1235   Troponin  0 Filed at: 08/29/2020 1235   HEART Score  4 Filed at: 08/29/2020 1235            JOSÉ ANTONIO/DAST-10      Most Recent Value   How many times in the past year have you    Used an illegal drug or used a prescription medication for non-medical reasons? Never Filed at: 08/29/2020 1103                                MDM  Number of Diagnoses or Management Options  Dementia ([address] ):   Diarrhea: new and requires workup  Failure to thrive in adult: established and worsening  Generalized weakness: established and worsening  Diagnosis management comments: DDX includes but is not limited to: Dehydration, electrolyte abnormality, deconditioning, CVA/TIA, infection, anemia, thyroid disorder, cardiac disorder, dysrhythmia  Lab results reviewed  Troponin normal less than 0 02  CBC demonstrates normal white blood cell count 9 7, hemoglobin 11 1 is mildly low, hematocrit 37 9 is normal   Basic metabolic panel demonstrates a BUN of 20 creatinine 0 97 which are normal   Hepatic function panel demonstrates an AST of 22, ALT slightly low at 11  CT head images independently visualized and interpreted by me  Radiology report was reviewed:FINDINGS:     PARENCHYMA: Decreased attenuation is noted in periventricular and subcortical white matter demonstrating an appearance that is statistically most likely to represent moderate microangiopathic change  No CT signs of acute infarction   No intracranial mass, mass effect or midline shift   No acute parenchymal hemorrhage  VENTRICLES AND EXTRA-AXIAL SPACES:  Normal for the patient's age  VISUALIZED ORBITS AND PARANASAL SINUSES:  Unremarkable       CALVARIUM AND EXTRACRANIAL SOFT TISSUES:  Normal      chest xray images independently visualized and interpreted by me  No acute pneumothorax or infiltrate  Amount and/or Complexity of Data Reviewed  Clinical lab tests: ordered and reviewed  Tests in the radiology section of CPT®: ordered and reviewed  Tests in the medicine section of CPT®: ordered and reviewed  Discussion of test results with the performing providers: yes  Obtain history from someone other than the patient: yes (EMS)  Review and summarize past medical records: yes  Discuss the patient with other providers: yes (ARGELIA/Dr Colón)  Independent visualization of images, tracings, or specimens: yes    Risk of Complications, Morbidity, and/or Mortality  General comments: ED course:  51-year-old female with history of dementia, generalized weakness not eating at home and failure to thrive presents to the emergency department with increasing weakness and diarrhea at home  She lives with her daughter  Recently was let out of short-term rehab  Reports progressive weakness since her discharge home  She appears malnourished and dehydrated but is alert and awake  Her workup demonstrates no urinary tract infection, normal white blood cell count  No significant elevation of creatinine  Troponin is normal   EKG is nonischemic  CT scan of the head demonstrates no acute source for symptoms  Patient's laboratory evaluation is largely unremarkable however given her progression of symptoms, likely secondary to combination of chronic degenerative changes from age, malnutrition, diarrhea exacerbated by decreased oral intake are all likely contributing to patient's generalized weakness and failure to thrive  Her weakness is  Persisting and worsening  She should undergo evaluation for possible rehab/longer term placement give her declining status     Case discussed with Dr Omayra Man regarding admission           Disposition  Final diagnoses:   Generalized weakness   Failure to thrive in adult   Dementia [facility]) Diarrhea     Time reflects when diagnosis was documented in both MDM as applicable and the Disposition within this note     Time User Action Codes Description Comment    8/29/2020  1:31 PM Najera Neat Add [R53 1] Generalized weakness     8/29/2020  1:31 PM Najera Neat Add [R62 7] Failure to thrive in adult     8/29/2020  1:31 PM Najera Neat Add [F03 90] Dementia ([address] )     8/29/2020  1:31 PM Najera Neat Add [R19 7] Diarrhea       ED Disposition     ED Disposition Condition Date/Time Comment    Admit Stable Sat Aug 29, 2020  1:31 PM Case was discussed with Dr Rc Burrows and the patient's admission status was agreed to be Admission Status: inpatient status to the service of Dr Rc Burrows   Follow-up Information    None         Patient's Medications    No medications on file     No discharge procedures on file      PDMP Review     None          ED Provider  Electronically Signed by           Mely Segura PA-C  08/29/20 0641

## 2020-08-29 NOTE — ED NOTES
1  CC: weakness, diarrhea     2  Orientation status: alert and oriented x4, [place]     3  Abnormal labs/ focused assessment/ vitals: monitoring for diarrhea and weakness  4  Medication/ drips: 1L NS in ED     5  Last time narcotics given/ pain medications: none     6  IV lines/drains/ etc: 20g R AC     7  Isolation status: none as of now     8  Skin: intact     9   Ambulation status: did not ambulate in ED     10  ED phone number: [phone], RN  08/29/20 2383

## 2020-08-29 NOTE — ASSESSMENT & PLAN NOTE
Does not appear to be in any significant respiratory distress  Has history of tobacco abuse  Would give her breathing treatments as needed only

## 2020-08-29 NOTE — ASSESSMENT & PLAN NOTE
Multifactorial including she had an accident  She is not complaining of any abdominal pain or diarrhea to me  As per patient, once in while she is incontinent of the stools  However, as per patient's daughter, this has been more frequent  PT/OT    Case management follow-up for possible placement

## 2020-08-30 LAB
ANION GAP SERPL CALCULATED.3IONS-SCNC: 8 MMOL/L (ref 4–13)
ATRIAL RATE: 60 BPM
BUN SERPL-MCNC: 20 MG/DL (ref 5–25)
CALCIUM SERPL-MCNC: 8.1 MG/DL (ref 8.3–10.1)
CHLORIDE SERPL-SCNC: 106 MMOL/L (ref 100–108)
CO2 SERPL-SCNC: 26 MMOL/L (ref 21–32)
CREAT SERPL-MCNC: 0.83 MG/DL (ref 0.6–1.3)
GFR SERPL CREATININE-BSD FRML MDRD: 62 ML/MIN/1.73SQ M
GLUCOSE SERPL-MCNC: 90 MG/DL (ref 65–140)
P AXIS: 79 DEGREES
POTASSIUM SERPL-SCNC: 4.3 MMOL/L (ref 3.5–5.3)
PR INTERVAL: 140 MS
PROCALCITONIN SERPL-MCNC: 0.08 NG/ML
QRS AXIS: -20 DEGREES
QRSD INTERVAL: 84 MS
QT INTERVAL: 434 MS
QTC INTERVAL: 434 MS
SODIUM SERPL-SCNC: 140 MMOL/L (ref 136–145)
T WAVE AXIS: 83 DEGREES
VENTRICULAR RATE: 60 BPM

## 2020-08-30 PROCEDURE — 97166 OT EVAL MOD COMPLEX 45 MIN: CPT

## 2020-08-30 PROCEDURE — 84145 PROCALCITONIN (PCT): CPT | Performed by: INTERNAL MEDICINE

## 2020-08-30 PROCEDURE — 87493 C DIFF AMPLIFIED PROBE: CPT | Performed by: PHYSICIAN ASSISTANT

## 2020-08-30 PROCEDURE — 99233 SBSQ HOSP IP/OBS HIGH 50: CPT | Performed by: INTERNAL MEDICINE

## 2020-08-30 PROCEDURE — 97530 THERAPEUTIC ACTIVITIES: CPT

## 2020-08-30 PROCEDURE — 97163 PT EVAL HIGH COMPLEX 45 MIN: CPT

## 2020-08-30 PROCEDURE — 93010 ELECTROCARDIOGRAM REPORT: CPT | Performed by: INTERNAL MEDICINE

## 2020-08-30 PROCEDURE — 80048 BASIC METABOLIC PNL TOTAL CA: CPT | Performed by: INTERNAL MEDICINE

## 2020-08-30 RX ADMIN — SODIUM CHLORIDE, SODIUM GLUCONATE, SODIUM ACETATE, POTASSIUM CHLORIDE, MAGNESIUM CHLORIDE, SODIUM PHOSPHATE, DIBASIC, AND POTASSIUM PHOSPHATE 75 ML/HR: .53; .5; .37; .037; .03; .012; .00082 INJECTION, SOLUTION INTRAVENOUS at 09:31

## 2020-08-30 RX ADMIN — SERTRALINE HYDROCHLORIDE 25 MG: 25 TABLET ORAL at 09:29

## 2020-08-30 RX ADMIN — AMLODIPINE BESYLATE 5 MG: 5 TABLET ORAL at 09:29

## 2020-08-30 RX ADMIN — ENOXAPARIN SODIUM 30 MG: 30 INJECTION SUBCUTANEOUS at 09:29

## 2020-08-30 RX ADMIN — DONEPEZIL HYDROCHLORIDE 10 MG: 5 TABLET ORAL at 09:29

## 2020-08-30 NOTE — MALNUTRITION/BMI
This medical record reflects one or more clinical indicators suggestive of malnutrition and/or morbid obesity  Malnutrition Findings:   Malnutrition type: Chronic illness  Degree of Malnutrition: Other severe protein calorie malnutrition(21% wt loss in unknown time frame, BMI <18 5, body fat=severe depletion at temples and triceps, muscle mass= severe depletion at clavicles and temples  treatment= diet/supplements)  Malnutrition Characteristics: Weight loss    BMI Findings:  BMI Classifications: Underweight < 18 5     Body mass index is 12 14 kg/m²  See Nutrition note dated 8/30/20 for additional details  Completed nutrition assessment is viewable in the nutrition documentation

## 2020-08-30 NOTE — QUICK NOTE
Was approached by nursing staff regarding patient with large watery bowel movement this evening  Reporting that the patient came in with diarrhea and has had 2 episodes of watery stools while here  Will obtain C  Diff PCR at this time as patient has been having this diarrhea prior to admission and is worsening  Monitor      Ramsey Salazar PA-C

## 2020-08-30 NOTE — PHYSICAL THERAPY NOTE
Physical Therapy Evaluation     Patient's Name: Lewis Chan    Admitting Diagnosis  Diarrhea [R19 7]  Severe protein-calorie malnutrition ([facility] ) [E43]  Dementia ([facility] ) [F03 90]  Failure to thrive in adult [R62 7]  Generalized weakness [R53 1]  Weakness [R53 1]    Problem List  Patient Active Problem List   Diagnosis    Weakness/ambulatory dysfunction    Failure to thrive in adult    Severe protein-calorie malnutrition ([facility] )    Alzheimer's dementia without behavioral disturbance ([facility] )    Essential hypertension    Chronic respiratory insufficiency    Anxiety and depression       Past Medical History  Past Medical History:   Diagnosis Date    Dementia ([facility] )     Hypertension        Past Surgical History  History reviewed  No pertinent surgical history  08/30/20 1330   Note Type   Note type Eval/Treat   Pain Assessment   Pain Assessment Tool Pain Assessment not indicated - pt denies pain   Pain Score No Pain   Home Living   Type of Home House   Home Layout Two level;Performs ADLs on one level; Able to live on main level with bedroom/bathroom   [address] [facility]; Wheelchair-manual   Additional Comments walker used at baseline per pt   Prior Function   Level of [place] Independent with ADLs and functional mobility   Lives With Daughter   Receives Help From Family   ADL Assistance Independent   IADLs Needs assistance   Falls in the last 6 months 0  ((+) fall history)   Comments pt questionable historian, CM to follow up  pt with baseline dementia   Restrictions/Precautions   Weight Bearing Precautions Per Order No   Braces or Orthoses   (none per pt)   Other Precautions Bed Alarm;Cognitive;Hard of hearing;Multiple lines; Fall Risk   General   Family/Caregiver Present No   Cognition   Overall Cognitive Status Impaired   Arousal/Participation Alert   Orientation Level Oriented to person;Disoriented to place; Disoriented to time;Disoriented to situation  ("2020")   Memory Decreased recall of biographical information;Decreased short term memory;Decreased recall of recent events   Following Commands Follows multistep commands with increased time or repetition   Comments pt agreeable to PT evaluation   RUE Assessment   RUE Assessment   (defer to OT eval for comments)   LUE Assessment   LUE Assessment   (defer to OT eval for comments)   RLE Assessment   RLE Assessment X  (grossly 3+/5)   LLE Assessment   LLE Assessment X  (grossly 3+/5)   Coordination   Movements are Fluid and Coordinated 1  (no ataxia observed)   Sensation WFL   Light Touch   RLE Light Touch Grossly intact   LLE Light Touch Grossly intact   Bed Mobility   Supine to Sit 4  Minimal assistance   Additional items Assist x 1;HOB elevated; Increased time required;Verbal cues   Sit to Supine 4  Minimal assistance   Additional items Assist x 1;HOB elevated; Increased time required;Verbal cues   Transfers   Sit to Stand 4  Minimal assistance   Additional items Assist x 1; Increased time required;Verbal cues   Stand to Sit 4  Minimal assistance   Additional items Assist x 1; Increased time required;Verbal cues   Ambulation/Elevation   Gait pattern Decreased foot clearance; Short stride; Step to;R Knee Jarad;L Knee Jarad   Gait Assistance 4  Minimal assist   Additional items Assist x 1;Verbal cues; Tactile cues   Assistive Device Rolling walker   Distance 10'   Stair Management Assistance Not tested   Balance   Static Sitting Fair +   Dynamic Sitting Fair   Static Standing Fair -   Dynamic Standing Poor +   Ambulatory Poor +   Endurance Deficit   Endurance Deficit Yes   Activity Tolerance   Activity Tolerance Patient limited by fatigue   Medical Staff Made Aware OT Sanam   Nurse Made Aware RN Felisha Arellano verbalized pt appropriate to see, made aware of session outcome/recs   Assessment   Prognosis Fair   Problem List Decreased strength;Decreased endurance; Impaired balance;Decreased mobility; Decreased cognition   Assessment Pt is 80 y o  female seen for PT evaluation on 8/30/2020 s/p admit to [facility] on 8/29/2020 w/ Weakness  PT consulted to assess pt's functional mobility and d/c needs  Order placed for PT eval and tx, w/ up w/ A order  Performed at least 2 patient identifiers during session: Name and wristband  Comorbidities affecting pt's physical performance at time of assessment include: dementia,  HTN, failure to thrive, severe protein-calorie malnutrition, generalized weakness  PTA, pt was independent w/ all functional mobility w/ walker, ambulates community distances and elevations, ambulates household distances and lives w/ dtr in 2 level home  Personal factors affecting pt at time of IE include: inaccessible home environment, ambulating w/ assistive device, inability to navigate level surfaces w/o external assistance, decreased cognition, limited home support, positive fall history, hearing impairments and decreased initiation and engagement  Please find objective findings from PT assessment regarding body systems outlined above with impairments and limitations including weakness, impaired balance, decreased endurance, gait deviations, decreased activity tolerance, fall risk and decreased cognition, as well as mobility assessment (need for min assist w/ all phases of mobility when usually ambulating independently and need for cueing for mobility technique)  The following objective measures performed on IE also reveal limitations: Barthel Index: 30/100 and Modified [place]: 4 (moderate/severe disability)  Pt's clinical presentation is currently unstable/unpredictable seen in pt's presentation of abnormal lab value(s), need for input for task focus and mobility technique and ongoing medical assessment  Pt to benefit from continued PT tx to address deficits as defined above and maximize level of functional independent mobility and consistency   From PT/mobility standpoint, recommendation at time of d/c would be STR pending progress in order to facilitate return to PLOF  Barriers to Discharge Decreased caregiver support; Inaccessible home environment   Goals   Patient Goals none expressed   STG Expiration Date 09/09/20   Short Term Goal #1 In 7-10 days: Increase bilateral LE strength 1/2 grade to facilitate independent mobility, Perform all bed mobility tasks modified independent to decrease caregiver burden, Perform all transfers modified independent to improve independence, Ambulate > 50' x3 with least restrictive assistive device modified independent w/o LOB and w/ normalized gait pattern 100% of the time, Increase all balance 1/2 grade to decrease risk for falls, Improve Barthel Index score to 45 or greater to facilitate independence and PT provider will perform functional balance assessment to determine fall risk   PT Treatment Day 1   Plan   Treatment/Interventions Functional transfer training;LE strengthening/ROM; Therapeutic exercise; Endurance training;Patient/family training;Equipment eval/education; Bed mobility;Gait training;Spoke to nursing;Spoke to case management;OT   PT Frequency   (3-5x/wk)   Recommendation   PT Discharge Recommendation Post-Acute Rehabilitation Services   Equipment Recommended Walker  (RW)   PT - OK to Discharge Yes  (when medically cleared if to STR)   Modified Lamar Scale   Modified Lamar Scale 4   Barthel Index   Feeding 5   Bathing 0   Grooming Score 0   Dressing Score 5   Bladder Score 5   Bowels Score 0   Toilet Use Score 5   Transfers (Bed/Chair) Score 10   Mobility (Level Surface) Score 0   Stairs Score 0   Barthel Index Score 30         Aylin Varma, PT, DPT    Physical Therapy Treatment Note  Time In: 1348  Time Out: 1358  Total Time: 10 min     S:  Pt agreeable to PT treatment session s/p PT eval, in no apparent distress and responsive      O:  Pt seen for PT treatment session this date with interventions consisting of therapeutic activity consisting of training: sit<>stand transfers, static standing tolerance for 1 minute w/ B UE support, vc and tactile cues for static standing posture faciliation and stand pivot transfer on/off bed side commode  VC required for technique      A:  Pt requiring A of 1 for OOB transfer on/off BSC  Pt requiring assist with hygiene  Education for [employer] management & upright posture during static stance  B rolling for bottom hygiene, as pt requiring assistance with hygiene tasks also d/t bowel movement in bed  Pt instructed on use of side rails to assist with positioning  VC for PLB/breathing technique post mobility  Post session: pt returned BTB, bed alarm engaged, all needs in reach and RN notified of session findings/recommendations      P:  Continue to recommend STR at time of d/c in order to maximize pt's functional independence and safety w/ mobility  Pt continues to be functioning below baseline level, and remains limited 2* factors listed above  PT will continue to see pt while here in order to address the deficits listed above and provide interventions consistent w/ POC in effort to achieve STGs      Bree Sanchez, PT, DPT

## 2020-08-30 NOTE — ASSESSMENT & PLAN NOTE
Malnutrition Findings:   Malnutrition type: Chronic illness  Degree of Malnutrition: Other severe protein calorie malnutrition(21% wt loss in unknown time frame, BMI <18 5, body fat=severe depletion at temples and triceps, muscle mass= severe depletion at clavicles and temples  treatment= diet/supplements)    BMI Findings:  BMI Classifications: Underweight < 18 5     Body mass index is 12 14 kg/m²

## 2020-08-30 NOTE — PLAN OF CARE
Problem: OCCUPATIONAL THERAPY ADULT  Goal: Performs self-care activities at highest level of function for planned discharge setting  See evaluation for individualized goals  Description: Treatment Interventions: ADL retraining, Functional transfer training, Endurance training, Cognitive reorientation, UE strengthening/ROM, Equipment evaluation/education, Patient/family training, Compensatory technique education, Energy conservation, Activityengagement          See flowsheet documentation for full assessment, interventions and recommendations  Note: Limitation: Decreased ADL status, Decreased Safe judgement during ADL, Decreased UE strength, Decreased cognition, Decreased endurance, Decreased self-care trans, Decreased high-level ADLs  Prognosis: Good  Assessment: Pt is a 80 y o  female seen for OT evaluation s/p admit to [facility] on 8/29/2020 w/ Weakness and ambulatory dysfunction  Comorbidities affecting pt's functional performance at time of assessment include: anxiety and depression, HTN, Alzheimer's dementia, malnutrition  Personal factors affecting pt at time of IE include:poor historian  Prior to admission, pt was living w/ daughter and reports independent w/ ADLs, independent w/ functional transfers and mobility w/ RW, assist w/ IADLs, assist transportation  Upon evaluation: Pt requires MIN assist supine<>sit bed mobility, MIN assist rolling in bed (incontinent of bowel), MIN assist functional mobility w/ RW (weakness and fatigue), MOD assist LB ADLs, MAX assist toileting (incontinent of bowel) 2* the following deficits impacting occupational performance: decreased strength and endurance, impaired activity tolerance, fall risk, multiple lines, fall risk, impaired functional reach, impaired cognition (STM, impaired insight and safety awareness)   Pt to benefit from continued skilled OT tx while in the hospital to address deficits as defined above and maximize level of functional independence w ADL's and functional mobility  Occupational Performance areas to address include: grooming, bathing/shower, toilet hygiene, dressing, functional mobility, community mobility and clothing management, formal cognitive assessment  From OT standpoint, recommendation at time of d/c would be short term rehab       OT Discharge Recommendation: Post-Acute Rehabilitation Services  OT - OK to Discharge: (to rehab when medically stable)

## 2020-08-30 NOTE — ASSESSMENT & PLAN NOTE
Continue PT/OT rehabilitative endeavors  - obtain case management evaluation for appropriate placement of patient

## 2020-08-30 NOTE — PLAN OF CARE
Problem: PHYSICAL THERAPY ADULT  Goal: Performs mobility at highest level of function for planned discharge setting  See evaluation for individualized goals  Description: Treatment/Interventions: Functional transfer training, LE strengthening/ROM, Therapeutic exercise, Endurance training, Patient/family training, Equipment eval/education, Bed mobility, Gait training, Spoke to nursing, Spoke to case management, OT  Equipment Recommended: Walker(RW)       See flowsheet documentation for full assessment, interventions and recommendations  Note: Prognosis: Fair  Problem List: Decreased strength, Decreased endurance, Impaired balance, Decreased mobility, Decreased cognition  Assessment: Pt is 80 y o  female seen for PT evaluation on 8/30/2020 s/p admit [facility] on 8/29/2020 w/ Weakness  PT consulted to assess pt's functional mobility and d/c needs  Order placed for PT eval and tx, w/ up w/ A order  Performed at least 2 patient identifiers during session: Name and wristband  Comorbidities affecting pt's physical performance at time of assessment include: dementia,  HTN, failure to thrive, severe protein-calorie malnutrition, generalized weakness  PTA, pt was independent w/ all functional mobility w/ walker, ambulates community distances and elevations, ambulates household distances and lives w/ dtr in 2 level home  Personal factors affecting pt at time of IE include: inaccessible home environment, ambulating w/ assistive device, inability to navigate level surfaces w/o external assistance, decreased cognition, limited home support, positive fall history, hearing impairments and decreased initiation and engagement   Please find objective findings from PT assessment regarding body systems outlined above with impairments and limitations including weakness, impaired balance, decreased endurance, gait deviations, decreased activity tolerance, fall risk and decreased cognition, as well as mobility assessment (need for min assist w/ all phases of mobility when usually ambulating independently and need for cueing for mobility technique)  The following objective measures performed on IE also reveal limitations: Barthel Index: 30/100 and Modified [place]: 4 (moderate/severe disability)  Pt's clinical presentation is currently unstable/unpredictable seen in pt's presentation of abnormal lab value(s), need for input for task focus and mobility technique and ongoing medical assessment  Pt to benefit from continued PT tx to address deficits as defined above and maximize level of functional independent mobility and consistency  From PT/mobility standpoint, recommendation at time of d/c would be STR pending progress in order to facilitate return to PLOF  Barriers to Discharge: Decreased caregiver support, Inaccessible home environment     PT Discharge Recommendation: [address]     PT - OK to Discharge: Yes(when medically cleared if to STR)    See flowsheet documentation for full assessment

## 2020-08-30 NOTE — OCCUPATIONAL THERAPY NOTE
Occupational Therapy Evaluation     Patient Name: Jem Eason  QQBNY'[id number] Date: 0/60/4551  Problem List  Principal Problem:    Weakness/ambulatory dysfunction  Active Problems:    Failure to thrive in adult    Severe protein-calorie malnutrition ([facility] )    Alzheimer's dementia without behavioral disturbance ([facility] )    Essential hypertension    Chronic respiratory insufficiency    Anxiety and depression    Past Medical History  Past Medical History:   Diagnosis Date    D[facility] ([facility] )     Hypertension      Past Surgical History  History reviewed  No pertinent surgical history  08/30/20 1357   Note Type   Note type Eval/Treat   Restrictions/Precautions   Weight Bearing Precautions Per Order No   Other Precautions Chair Alarm; Bed Alarm;Cognitive; Fall Risk;Multiple lines   Pain Assessment   Pain Assessment Tool Pain Assessment not indicated - pt denies pain   Pain Score No Pain   Home Living   Type of Home House   Home Layout Two level; Able to live on main level with bedroom/bathroom; Performs ADLs on one level   [address] [place] [address] Rd; Wheelchair-manual   Additional Comments pt uses RW at baseline, pt poor historian and unable to report history   Prior Function   Level of [place] Independent with ADLs and functional mobility   Lives With Daughter   Receives Help From Family   ADL Assistance Independent   IADLs Needs assistance   Falls in the last 6 months 0  (has a fall history)   Vocational Retired   Comments pt questionable historian, reports daughter is working and not home with her during the day will follow up w/ CM   Lifestyle   Autonomy per pt independent w/ ADLs, independent w/ functional transfers and mobility w/ RW, assist w/ IADLs   Reciprocal Relationships daughter   Service to Others retired raised the kids   Intrinsic Gratification watching tv   Subjective   Subjective "I'm sorry, I can't control when I have to go"   ADL   Where Assessed Edge of bed Eating Assistance 5  Supervision/Setup   Grooming Assistance 4  Minimal Assistance   UB Bathing Assistance 4  Minimal Assistance   LB Bathing Assistance 3  Moderate Assistance   UB Dressing Assistance 4  Minimal Assistance   UB Dressing Deficit Setup;Steadying;Verbal cueing;Supervision/safety; Increased time to complete   LB Dressing Assistance 3  Moderate Assistance   Toileting Assistance  2  Maximal Assistance   Toileting Deficit Setup;Steadying;Verbal cueing;Supervison/safety; Increased time to complete;Clothing management up;Clothing management down  (incontinent of bowel upon arrival, use of BSC for urine)   Bed Mobility   Supine to Sit 4  Minimal assistance   Additional items Assist x 1; Increased time required;Verbal cues;LE management;HOB elevated; Bedrails   Sit to Supine 4  Minimal assistance   Additional items Assist x 1; Increased time required;Verbal cues;LE management   Additional Comments increased time to complete   Transfers   Sit to Stand 4  Minimal assistance   Additional items Assist x 1; Increased time required;Verbal cues   Stand to Sit 4  Minimal assistance   Additional items Assist x 1; Increased time required;Verbal cues   Toilet transfer 4  Minimal assistance   Additional items Assist x 1; Increased time required;Verbal cues; Commode   Additional Comments VCs for hand placement and positioning   Functional Mobility   Functional Mobility 4  Minimal assistance   Additional Comments assist x1 w/ increased time to complete and cues for safety, increased fatigue noted   Additional items Rolling walker   Balance   Static Sitting Fair +   Dynamic Sitting Fair   Static Standing Fair -   Dynamic Standing Poor +   Ambulatory Poor +   Activity Tolerance   Activity Tolerance Patient limited by fatigue   Nurse Made Aware appropriate to see per Shannon NGUYEN   RUSUKHWINDER Assessment   RUE Assessment WFL  (grossly 3+/5)   LUE Assessment   LUE Assessment WFL  (grossly 3+/5)   Hand Function   Gross Motor Coordination Functional   Fine Motor Coordination Functional   Sensation   Light Touch No apparent deficits   Proprioception   Proprioception No apparent deficits   Vision-Basic Assessment   Current Vision No visual deficits   Vision - Complex Assessment   Ocular Range of Motion WFL   Perception   Inattention/Neglect Appears intact   Cognition   Overall Cognitive Status Impaired   Arousal/Participation Responsive; Cooperative   Attention Attends with cues to redirect   Orientation Level Oriented to person;Disoriented to time;Disoriented to place; Disoriented to situation  (stated year as 2020)   Memory Decreased recall of recent events;Decreased short term memory;Decreased recall of precautions;Decreased recall of biographical information   Following Commands Follows one step commands with increased time or repetition   Comments able to state her birthday, decreased insight and safety awareness, pleasant and cooperative   Assessment   Limitation Decreased ADL status; Decreased Safe judgement during ADL;Decreased UE strength;Decreased cognition;Decreased endurance;Decreased self-care trans;Decreased high-level ADLs   Prognosis Good   Assessment Pt is a 80 y o  female seen for OT evaluation s/p admit to [facility] on 8/29/2020 w/ Weakness and ambulatory dysfunction  Comorbidities affecting pt's functional performance at time of assessment include: anxiety and depression, HTN, Alzheimer's dementia, malnutrition  Personal factors affecting pt at time of IE include:poor historian  Prior to admission, pt was living w/ daughter and reports independent w/ ADLs, independent w/ functional transfers and mobility w/ RW, assist w/ IADLs, assist transportation   Upon evaluation: Pt requires MIN assist supine<>sit bed mobility, MIN assist rolling in bed (incontinent of bowel), MIN assist functional mobility w/ RW (weakness and fatigue), MOD assist LB ADLs, MAX assist toileting (incontinent of bowel) 2* the following deficits impacting occupational performance: decreased strength and endurance, impaired activity tolerance, fall risk, multiple lines, fall risk, impaired functional reach, impaired cognition (STM, impaired insight and safety awareness)  Pt to benefit from continued skilled OT tx while in the hospital to address deficits as defined above and maximize level of functional independence w ADL's and functional mobility  Occupational Performance areas to address include: grooming, bathing/shower, toilet hygiene, dressing, functional mobility, community mobility and clothing management, formal cognitive assessment  From OT standpoint, recommendation at time of d/c would be short term rehab  Goals   Patient Goals none expressed at this time   LTG Time Frame 10-14   Long Term Goal please see below goals   Plan   Treatment Interventions ADL retraining;Functional transfer training; Endurance training;Cognitive reorientation;UE strengthening/ROM; Equipment evaluation/education;Patient/family training; Compensatory technique education; Energy conservation; Activityengagement   Goal Expiration Date 09/13/20   OT Frequency 3-5x/wk   Recommendation   OT Discharge Recommendation Post-Acute Rehabilitation Services   OT - OK to Discharge   (to rehab when medically stable)   Barthel Index   Feeding 5   Bathing 0   Grooming Score 0   Dressing Score 5   Bladder Score 5   Bowels Score 0   Toilet Use Score 5   Transfers (Bed/Chair) Score 10   Mobility (Level Surface) Score 0   Stairs Score 0   Barthel Index Score 30   Modified Khadar Scale   Modified [place] Scale 4     Occupational Therapy Goals to be met in 10-14 days:  1) Pt will improve activity tolerance to G for 30 min txment sessions to enhance ADLs  2) Pt will complete ADLs/self care w/ supervision  3) Pt will complete toileting w/ supervision w/ G hygiene/thoroughness using DME PRN  4) Pt will improve functional transfers on/off all surfaces using DME PRN w/ G balance/safety including toileting w/ supervision  5) Pt will improve fx'l mobility during I/ADl/leisure tasks using DME PRN w/ g balance/safety w/ supervision  6) Pt will engage in ongoing cognitive assessment w/ G participation to A w/ safe d/c planning/recommendations  7) Pt will demonstrate G carryover of pt/caregiver education and training as appropriate w/ mod I  w/ G tolerance  8) Pt will engage in depression screen/leisure interest checklist w/ G participation to monitor s/s depression and ID 3 positive coping strategies to A w/ emotional regulation and management  9) Pt will demonstrate 100% carryover of E C  techniques w/ mod I t/o fx'l I/ADL/leisure tasks w/o cues s/p skilled education  10) Pt will demonstrate improved bed mobility to supervision to enhance ADLs participation  11) Pt will demonstrate improved standing tolerance to 3-5 minutes during functional tasks w/ no LOB to enhance ADL performance  12) Pt will demonstrate improved b/l UE strength by 1 MMT grade to enhance ADLS and functional transfers    Documentation completed by: Jono Newberry MS, OTR/L

## 2020-08-30 NOTE — PROGRESS NOTES
Progress Note - King Kelley [birth year], 80 y o  female MRN: [id number]    Unit/Bed#: -01 Encounter: [id number]    Primary Care Provider: Randell Rangel MD   Date and time admitted to hospital: 8/29/2020 10:44 AM        * Weakness/ambulatory dysfunction  Assessment & Plan  Continue PT/OT rehabilitative endeavors  - obtain case management evaluation for appropriate placement of patient  Failure to thrive in adult  Assessment & Plan  Continue to nutritional supplementation with increased caloric intake and ensure protein products  Severe protein-calorie malnutrition ([address] )  Assessment & Plan  Malnutrition Findings:   Malnutrition type: Chronic illness  Degree of Malnutrition: Other severe protein calorie malnutrition(21% wt loss in unknown time frame, BMI <18 5, body fat=severe depletion at temples and triceps, muscle mass= severe depletion at clavicles and temples  treatment= diet/supplements)    BMI Findings:  BMI Classifications: Underweight < 18 5     Body mass index is 12 14 kg/m²  Alzheimer's dementia without behavioral disturbance (HCC)  Assessment & Plan  Continue donepezil and sertraline  Essential hypertension  Assessment & Plan  - Continue amlodipine  VTE Pharmacologic Prophylaxis:   Pharmacologic: Enoxaparin (Lovenox)  Mechanical VTE Prophylaxis in Place: Yes    Patient Centered Rounds: I have performed bedside rounds with nursing staff today  Discussions with Specialists or Other Care Team Provider:  None  Education and Discussions with Family / Patient:  [facility] in communication with patient's daughter for determination of appropriate disposition for the patient  Time Spent for Care: 30 minutes  More than 50% of total time spent on counseling and coordination of care as described above      Current Length of Stay: 1 day(s)    Current Patient Status: Inpatient   Certification Statement: The patient will continue to require additional inpatient hospital stay due to Appropriate placement for assistance with ADLs rehabilitative measures  Discharge Plan / Estimated Discharge Date:  Patient presents with low BMI, on evidence of severe protein calorie malnutrition  Will follow-up Case Management assessment for appropriate placement of patient / estimated discharge date on or before 2020  Code Status: Level 3 - DNAR and DNI      Subjective:   Patient interviewed at the bedside having breakfast   Patient is disoriented at baseline but able to express needs and concerns  Patient indicates to acute issues at present  Objective:     Vitals:   Temp (24hrs), Av °F (37 2 °C), Min:98 5 °F (36 9 °C), Max:99 7 °F (37 6 °C)    Temp:  [98 5 °F (36 9 °C)-99 7 °F (37 6 °C)] 98 7 °F (37 1 °C)  HR:  [58-95] 89  Resp:  [20] 20  BP: (126-132)/() 132/45  SpO2:  [88 %-96 %] 95 %  Body mass index is 12 14 kg/m²  Input and Output Summary (last 24 hours): Intake/Output Summary (Last 24 hours) at 2020 1705  Last data filed at 2020 1347  Gross per 24 hour   Intake 1070 ml   Output    Net 1070 ml       Physical Exam:     Physical Exam  Constitutional:       General: She is awake  Appearance: She is underweight  Comments: Atrophic muscles and sunken supraclavicular fossas  HENT:      Head: Normocephalic and atraumatic  Mouth/Throat:      Mouth: Mucous membranes are moist    Eyes:      Extraocular Movements: Extraocular movements intact  Conjunctiva/sclera: Conjunctivae normal    Cardiovascular:      Rate and Rhythm: Normal rate and regular rhythm  Heart sounds: S1 normal and S2 normal    Pulmonary:      Effort: Pulmonary effort is normal       Breath sounds: Normal breath sounds  Abdominal:      Palpations: Abdomen is soft  Skin:     General: Skin is warm and dry  Neurological:      Mental Status: She is alert  She is disoriented  Psychiatric:         Behavior: Behavior is cooperative           Cognition and Memory: Cognition is impaired  Additional Data:     Labs:    Results from last 7 days   Lab Units 08/29/20  1650 08/29/20  1100   WBC Thousand/uL  --  9 70   HEMOGLOBIN g/dL  --  11 1*   HEMATOCRIT %  --  37 9   PLATELETS Thousands/uL 322 394*   NEUTROS PCT %  --  79*   LYMPHS PCT %  --  11*   MONOS PCT %  --  7   EOS PCT %  --  3     Results from last 7 days   Lab Units 08/30/20  0551 08/29/20  1100   POTASSIUM mmol/L 4 3 5 0   CHLORIDE mmol/L 106 106   CO2 mmol/L 26 26   BUN mg/dL 20 20   CREATININE mg/dL 0 83 0 97   CALCIUM mg/dL 8 1* 8 5   ALK PHOS U/L  --  52   ALT U/L  --  11*   AST U/L  --  22           * I Have Reviewed All Lab Data Listed Above  * Additional Pertinent Lab Tests Reviewed: [address] Admission Reviewed    Imaging:    Imaging Reports Reviewed Today Include:  X-ray chest   CT head without contrast   Imaging Personally Reviewed by Myself Includes:  None  Recent Cultures (last 7 days):           Last 24 Hours Medication List:   Current Facility-Administered Medications   Medication Dose Route Frequency Provider Last Rate    acetaminophen  650 mg Oral Q6H PRN Cyndi Rooney MD      amLODIPine  5 mg Oral Daily Cyndi Rooney MD      donepezil  10 mg Oral Daily Cyndi Rooney MD      enoxaparin  30 mg Subcutaneous Daily Cyndi Rooney MD      ipratropium-albuterol  3 mL Nebulization Q4H PRN Cyndi Rooney MD      ondansetron  4 mg Intravenous Q6H PRN Cyndi Rooney MD      sertraline  25 mg Oral Daily Cyndi Rooney MD      simethicone  80 mg Oral 4x Daily PRN Cyndi Rooney MD          Today, Patient Was Seen By: Bridgette Vazquez MD    ** Please Note: Dragon 360 Dictation voice to text software may have been used in the creation of this document   **

## 2020-08-31 PROBLEM — R19.7 DIARRHEA: Status: ACTIVE | Noted: 2020-08-31

## 2020-08-31 LAB
ANION GAP SERPL CALCULATED.3IONS-SCNC: 7 MMOL/L (ref 4–13)
BASOPHILS # BLD AUTO: 0.05 THOUSANDS/ΜL (ref 0–0.1)
BASOPHILS NFR BLD AUTO: 1 % (ref 0–1)
BUN SERPL-MCNC: 23 MG/DL (ref 5–25)
C DIFF TOX GENS STL QL NAA+PROBE: NEGATIVE
CALCIUM SERPL-MCNC: 8.9 MG/DL (ref 8.3–10.1)
CHLORIDE SERPL-SCNC: 104 MMOL/L (ref 100–108)
CO2 SERPL-SCNC: 27 MMOL/L (ref 21–32)
CREAT SERPL-MCNC: 0.8 MG/DL (ref 0.6–1.3)
EOSINOPHIL # BLD AUTO: 0.42 THOUSAND/ΜL (ref 0–0.61)
EOSINOPHIL NFR BLD AUTO: 4 % (ref 0–6)
ERYTHROCYTE [DISTWIDTH] IN BLOOD BY AUTOMATED COUNT: 18.2 % (ref 11.6–15.1)
GFR SERPL CREATININE-BSD FRML MDRD: 65 ML/MIN/1.73SQ M
GLUCOSE SERPL-MCNC: 102 MG/DL (ref 65–140)
HCT VFR BLD AUTO: 33 % (ref 34.8–46.1)
HGB BLD-MCNC: 9.8 G/DL (ref 11.5–15.4)
IMM GRANULOCYTES # BLD AUTO: 0.06 THOUSAND/UL (ref 0–0.2)
IMM GRANULOCYTES NFR BLD AUTO: 1 % (ref 0–2)
LYMPHOCYTES # BLD AUTO: 1.4 THOUSANDS/ΜL (ref 0.6–4.47)
LYMPHOCYTES NFR BLD AUTO: 13 % (ref 14–44)
MCH RBC QN AUTO: 26.5 PG (ref 26.8–34.3)
MCHC RBC AUTO-ENTMCNC: 29.7 G/DL (ref 31.4–37.4)
MCV RBC AUTO: 89 FL (ref 82–98)
MONOCYTES # BLD AUTO: 1.06 THOUSAND/ΜL (ref 0.17–1.22)
MONOCYTES NFR BLD AUTO: 10 % (ref 4–12)
NEUTROPHILS # BLD AUTO: 7.62 THOUSANDS/ΜL (ref 1.85–7.62)
NEUTS SEG NFR BLD AUTO: 71 % (ref 43–75)
NRBC BLD AUTO-RTO: 0 /100 WBCS
PLATELET # BLD AUTO: 319 THOUSANDS/UL (ref 149–390)
PMV BLD AUTO: 9.8 FL (ref 8.9–12.7)
POTASSIUM SERPL-SCNC: 4.4 MMOL/L (ref 3.5–5.3)
RBC # BLD AUTO: 3.7 MILLION/UL (ref 3.81–5.12)
SODIUM SERPL-SCNC: 138 MMOL/L (ref 136–145)
WBC # BLD AUTO: 10.61 THOUSAND/UL (ref 4.31–10.16)

## 2020-08-31 PROCEDURE — 99233 SBSQ HOSP IP/OBS HIGH 50: CPT | Performed by: INTERNAL MEDICINE

## 2020-08-31 PROCEDURE — 85025 COMPLETE CBC W/AUTO DIFF WBC: CPT | Performed by: INTERNAL MEDICINE

## 2020-08-31 PROCEDURE — 80048 BASIC METABOLIC PNL TOTAL CA: CPT | Performed by: INTERNAL MEDICINE

## 2020-08-31 RX ORDER — LACTOBACILLUS ACIDOPHILUS / LACTOBACILLUS BULGARICUS 100 MILLION CFU STRENGTH
1 GRANULES ORAL
Status: DISCONTINUED | OUTPATIENT
Start: 2020-08-31 | End: 2020-09-02 | Stop reason: HOSPADM

## 2020-08-31 RX ADMIN — DONEPEZIL HYDROCHLORIDE 10 MG: 5 TABLET ORAL at 09:15

## 2020-08-31 RX ADMIN — AMLODIPINE BESYLATE 5 MG: 5 TABLET ORAL at 09:15

## 2020-08-31 RX ADMIN — ENOXAPARIN SODIUM 30 MG: 30 INJECTION SUBCUTANEOUS at 09:15

## 2020-08-31 RX ADMIN — Medication 1 PACKET: at 09:20

## 2020-08-31 RX ADMIN — Medication 1 PACKET: at 17:56

## 2020-08-31 RX ADMIN — SERTRALINE HYDROCHLORIDE 25 MG: 25 TABLET ORAL at 09:15

## 2020-08-31 RX ADMIN — Medication 1 PACKET: at 14:57

## 2020-08-31 NOTE — CASE MANAGEMENT
STEPHEN spoke with Samina Cadena from [address] [place] [address] via telephone  She stated that they can accept pt as private pay or MA pending  She said that pt was recently discharged on 8/19/20 so they are familiar with pt  She said that pt ran out of Medicare days  She said that pt's daughter will need to talk with their   She said that pt's daughter can call her and then she can direct daughter to the correct person to talk to in business office  CM called Smiley Arana via telephone to inform her that pt ran out of Medicare days and will need to private pay or MA pending  CM provided her with Adia's number so that she can call her to be directed to [place]'s business office

## 2020-08-31 NOTE — CASE MANAGEMENT
STR rec  CM contacted pt's daughter, Omayra Godoy at [phone] to discuss  She said that she wants pt to go back to [place] for STR and possibly leading into LTC  CM explained that if pt will transition into LTC, pt will need to apply for MA while at the SNF  Omayra Godoy acknowledged understanding of this  CM sent referral to [address] [place] [address]

## 2020-08-31 NOTE — PROGRESS NOTES
Progress Note - Lewis Chan [birth year], 80 y o  female MRN: [id number]    Unit/Bed#: -01 Encounter: [id number]    Primary Care Provider: Jose Luis Britton MD   Date and time admitted to hospital: 8/29/2020 10:44 AM        * Weakness/ambulatory dysfunction  Assessment & Plan  Continue PT/OT rehabilitative endeavors  - obtain case management evaluation for appropriate placement of patient  Failure to thrive in adult  Assessment & Plan  Continue to nutritional supplementation with increased caloric intake and ensure protein products  Severe protein-calorie malnutrition ([address] )  Assessment & Plan  Malnutrition Findings:   Malnutrition type: Chronic illness  Degree of Malnutrition: Other severe protein calorie malnutrition(21% wt loss in unknown time frame, BMI <18 5, body fat=severe depletion at temples and triceps, muscle mass= severe depletion at clavicles and temples  treatment= diet/supplements)    BMI Findings:  BMI Classifications: Underweight < 18 5     Body mass index is 12 22 kg/m²  Alzheimer's dementia without behavioral disturbance (HCC)  Assessment & Plan  Continue donepezil and sertraline  Diarrhea  Assessment & Plan  Noted that patient has been experiencing diarrhea since her admission  Diarrhea described as watery consistency  No known recent hospitalizations prior to presentation or antibiotic use  C diff PCR sent  - follow-up C diff PCR result: negative  - trend electrolytes  VTE Pharmacologic Prophylaxis:   Pharmacologic: Enoxaparin (Lovenox)  Mechanical VTE Prophylaxis in Place: Yes    Patient Centered Rounds: I have performed bedside rounds with nursing staff today  Discussions with Specialists or Other Care Team Provider:  None  Education and Discussions with Family / Patient:  None  Time Spent for Care: 30 minutes  More than 50% of total time spent on counseling and coordination of care as described above      Current Length of Stay: 2 day(s)    Current Patient Status: Inpatient   Certification Statement: The patient will continue to require additional inpatient hospital stay due to determination of appropriate placement    Discharge Plan / Estimated Discharge Date: Will follow with case management of regards to the appropriate post admission placement for the patient /estimated discharge date on or before 2020  Code Status: Level 3 - DNAR and DNI      Subjective:   Patient interviewed today at the bedside  Patient resting at the time of initiation the interview  Patient states that she is comfortable, has been eating and reports no acute issues  A complete review of systems unremarkable  Objective:     Vitals:   Temp (24hrs), Av 3 °F (37 4 °C), Min:98 4 °F (36 9 °C), Max:100 °F (37 8 °C)    Temp:  [98 4 °F (36 9 °C)-100 °F (37 8 °C)] 98 4 °F (36 9 °C)  HR:  [89-93] 93  Resp:  [16] 16  BP: (121-136)/(44-47) 121/44  SpO2:  [95 %-96 %] 96 %  Body mass index is 12 22 kg/m²  Input and Output Summary (last 24 hours): Intake/Output Summary (Last 24 hours) at 2020 1126  Last data filed at 2020 1347  Gross per 24 hour   Intake 120 ml   Output    Net 120 ml       Physical Exam:     Physical Exam  Vitals signs reviewed  Constitutional:       Appearance: Normal appearance  She is underweight  HENT:      Head: Normocephalic and atraumatic  Eyes:      Extraocular Movements: Extraocular movements intact  Conjunctiva/sclera: Conjunctivae normal    Cardiovascular:      Rate and Rhythm: Normal rate and regular rhythm  Heart sounds: S1 normal    Pulmonary:      Effort: Pulmonary effort is normal       Breath sounds: Normal breath sounds  Abdominal:      General: Bowel sounds are normal       Palpations: Abdomen is soft  Skin:     General: Skin is warm and dry  Neurological:      General: No focal deficit present     Psychiatric:         Mood and Affect: Mood normal          Behavior: Behavior normal          Additional Data:     Labs:    Results from last 7 days   Lab Units 08/31/20  0536   WBC Thousand/uL 10 61*   HEMOGLOBIN g/dL 9 8*   HEMATOCRIT % 33 0*   PLATELETS Thousands/uL 319   NEUTROS PCT % 71   LYMPHS PCT % 13*   MONOS PCT % 10   EOS PCT % 4     Results from last 7 days   Lab Units 08/31/20  0536  08/29/20  1100   POTASSIUM mmol/L 4 4   < > 5 0   CHLORIDE mmol/L 104   < > 106   CO2 mmol/L 27   < > 26   BUN mg/dL 23   < > 20   CREATININE mg/dL 0 80   < > 0 97   CALCIUM mg/dL 8 9   < > 8 5   ALK PHOS U/L  --   --  52   ALT U/L  --   --  11*   AST U/L  --   --  22    < > = values in this interval not displayed  * I Have Reviewed All Lab Data Listed Above  * Additional Pertinent Lab Tests Reviewed: All Labs Within Last 24 Hours Reviewed    Imaging:    Imaging Reports Reviewed Today Include:  None  Imaging Personally Reviewed by Myself Includes:  None  Recent Cultures (last 7 days):     Results from last 7 days   Lab Units 08/30/20  1950   C DIFF TOXIN B  Negative       Last 24 Hours Medication List:   Current Facility-Administered Medications   Medication Dose Route Frequency Provider Last Rate    acetaminophen  650 mg Oral Q6H PRN Max Hand, MD      amLODIPine  5 mg Oral Daily Max Hand, MD      donepezil  10 mg Oral Daily Max Hand, MD      enoxaparin  30 mg Subcutaneous Daily Max Hand, MD      ipratropium-albuterol  3 mL Nebulization Q4H PRN Max Hand, MD      lactobacillus acidophilus-bulgaricus  1 packet Oral TID With Meals Max Hand, MD      ondansetron  4 mg Intravenous Q6H PRN Max Hand, MD      sertraline  25 mg Oral Daily Max Hand, MD      simethicone  80 mg Oral 4x Daily PRN Max Hand, MD          Today, Patient Was Seen By: Carlos A Gaston MD    ** Please Note: Dragon 360 Dictation voice to text software may have been used in the creation of this document   **

## 2020-08-31 NOTE — PLAN OF CARE
Problem: Potential for Falls  Goal: Patient will remain free of falls  Description: INTERVENTIONS:  - Assess patient frequently for physical needs  -  Identify cognitive and physical deficits and behaviors that affect risk of falls  -  [place] fall precautions as indicated by assessment   - Educate patient/family on patient safety including physical limitations  - Instruct patient to call for assistance with activity based on assessment  - Modify environment to reduce risk of injury  - Consider OT/PT consult to assist with strengthening/mobility  Outcome: Progressing     Problem: Prexisting or High Potential for Compromised Skin Integrity  Goal: Skin integrity is maintained or improved  Description: INTERVENTIONS:  - Identify patients at risk for skin breakdown  - Assess and monitor skin integrity  - Assess and monitor nutrition and hydration status  - Monitor labs   - Assess for incontinence   - Turn and reposition patient  - Assist with mobility/ambulation  - Relieve pressure over bony prominences  - Avoid friction and shearing  - Provide appropriate hygiene as needed including keeping skin clean and dry  - Evaluate need for skin moisturizer/barrier cream  - Collaborate with interdisciplinary team   - Patient/family teaching  - Consider wound care consult   Outcome: Progressing     Problem: Nutrition/Hydration-ADULT  Goal: Nutrient/Hydration intake appropriate for improving, restoring or maintaining nutritional needs  Description: Monitor and assess patient's nutrition/hydration status for malnutrition  Collaborate with interdisciplinary team and initiate plan and interventions as ordered  Monitor patient's weight and dietary intake as ordered or per policy  Utilize nutrition screening tool and intervene as necessary  Determine patient's food preferences and provide high-protein, high-caloric foods as appropriate       INTERVENTIONS:  - Monitor oral intake, urinary output, labs, and treatment plans  - Assess nutrition and hydration status and recommend course of action  - Evaluate amount of meals eaten  - Assist patient with eating if necessary   - Allow adequate time for meals  - Recommend/ encourage appropriate diets, oral nutritional supplements, and vitamin/mineral supplements  - Order, calculate, and assess calorie counts as needed  - Recommend, monitor, and adjust tube feedings and TPN/PPN based on assessed needs  - Assess need for intravenous fluids  - Provide specific nutrition/hydration education as appropriate  - Include patient/family/caregiver in decisions related to nutrition  Outcome: Progressing

## 2020-08-31 NOTE — ASSESSMENT & PLAN NOTE
Noted that patient has been experiencing diarrhea since her admission  Diarrhea described as watery consistency  No known recent hospitalizations prior to presentation or antibiotic use  C diff PCR sent  - follow-up C diff PCR result: negative  - trend electrolytes

## 2020-08-31 NOTE — ASSESSMENT & PLAN NOTE
Malnutrition Findings:   Malnutrition type: Chronic illness  Degree of Malnutrition: Other severe protein calorie malnutrition(21% wt loss in unknown time frame, BMI <18 5, body fat=severe depletion at temples and triceps, muscle mass= severe depletion at clavicles and temples  treatment= diet/supplements)    BMI Findings:  BMI Classifications: Underweight < 18 5     Body mass index is 12 22 kg/m²

## 2020-09-01 LAB
ANION GAP SERPL CALCULATED.3IONS-SCNC: 7 MMOL/L (ref 4–13)
BASOPHILS # BLD AUTO: 0.05 THOUSANDS/ΜL (ref 0–0.1)
BASOPHILS NFR BLD AUTO: 0 % (ref 0–1)
BUN SERPL-MCNC: 15 MG/DL (ref 5–25)
CALCIUM SERPL-MCNC: 8.7 MG/DL (ref 8.3–10.1)
CHLORIDE SERPL-SCNC: 103 MMOL/L (ref 100–108)
CO2 SERPL-SCNC: 28 MMOL/L (ref 21–32)
CREAT SERPL-MCNC: 0.97 MG/DL (ref 0.6–1.3)
EOSINOPHIL # BLD AUTO: 0.43 THOUSAND/ΜL (ref 0–0.61)
EOSINOPHIL NFR BLD AUTO: 4 % (ref 0–6)
ERYTHROCYTE [DISTWIDTH] IN BLOOD BY AUTOMATED COUNT: 18.5 % (ref 11.6–15.1)
GFR SERPL CREATININE-BSD FRML MDRD: 52 ML/MIN/1.73SQ M
GLUCOSE SERPL-MCNC: 91 MG/DL (ref 65–140)
HCT VFR BLD AUTO: 33.3 % (ref 34.8–46.1)
HGB BLD-MCNC: 9.9 G/DL (ref 11.5–15.4)
IMM GRANULOCYTES # BLD AUTO: 0.06 THOUSAND/UL (ref 0–0.2)
IMM GRANULOCYTES NFR BLD AUTO: 1 % (ref 0–2)
LYMPHOCYTES # BLD AUTO: 1.67 THOUSANDS/ΜL (ref 0.6–4.47)
LYMPHOCYTES NFR BLD AUTO: 15 % (ref 14–44)
MCH RBC QN AUTO: 26.1 PG (ref 26.8–34.3)
MCHC RBC AUTO-ENTMCNC: 29.7 G/DL (ref 31.4–37.4)
MCV RBC AUTO: 88 FL (ref 82–98)
MONOCYTES # BLD AUTO: 1.1 THOUSAND/ΜL (ref 0.17–1.22)
MONOCYTES NFR BLD AUTO: 10 % (ref 4–12)
NEUTROPHILS # BLD AUTO: 7.96 THOUSANDS/ΜL (ref 1.85–7.62)
NEUTS SEG NFR BLD AUTO: 70 % (ref 43–75)
NRBC BLD AUTO-RTO: 0 /100 WBCS
PLATELET # BLD AUTO: 321 THOUSANDS/UL (ref 149–390)
PMV BLD AUTO: 9.7 FL (ref 8.9–12.7)
POTASSIUM SERPL-SCNC: 4.2 MMOL/L (ref 3.5–5.3)
PROCALCITONIN SERPL-MCNC: 0.08 NG/ML
RBC # BLD AUTO: 3.79 MILLION/UL (ref 3.81–5.12)
SODIUM SERPL-SCNC: 138 MMOL/L (ref 136–145)
WBC # BLD AUTO: 11.27 THOUSAND/UL (ref 4.31–10.16)

## 2020-09-01 PROCEDURE — 80048 BASIC METABOLIC PNL TOTAL CA: CPT | Performed by: INTERNAL MEDICINE

## 2020-09-01 PROCEDURE — 97110 THERAPEUTIC EXERCISES: CPT

## 2020-09-01 PROCEDURE — 99232 SBSQ HOSP IP/OBS MODERATE 35: CPT | Performed by: INTERNAL MEDICINE

## 2020-09-01 PROCEDURE — 85025 COMPLETE CBC W/AUTO DIFF WBC: CPT | Performed by: INTERNAL MEDICINE

## 2020-09-01 PROCEDURE — 84145 PROCALCITONIN (PCT): CPT | Performed by: INTERNAL MEDICINE

## 2020-09-01 PROCEDURE — 97530 THERAPEUTIC ACTIVITIES: CPT

## 2020-09-01 PROCEDURE — 97116 GAIT TRAINING THERAPY: CPT

## 2020-09-01 RX ADMIN — ENOXAPARIN SODIUM 30 MG: 30 INJECTION SUBCUTANEOUS at 09:27

## 2020-09-01 RX ADMIN — AMLODIPINE BESYLATE 5 MG: 5 TABLET ORAL at 09:32

## 2020-09-01 RX ADMIN — SERTRALINE HYDROCHLORIDE 25 MG: 25 TABLET ORAL at 09:27

## 2020-09-01 RX ADMIN — Medication 1 PACKET: at 13:04

## 2020-09-01 RX ADMIN — Medication 1 PACKET: at 09:27

## 2020-09-01 RX ADMIN — DONEPEZIL HYDROCHLORIDE 10 MG: 5 TABLET ORAL at 09:27

## 2020-09-01 RX ADMIN — Medication 1 PACKET: at 17:43

## 2020-09-01 NOTE — PLAN OF CARE
Problem: Potential for Falls  Goal: Patient will remain free of falls  Description: INTERVENTIONS:  - Assess patient frequently for physical needs  -  Identify cognitive and physical deficits and behaviors that affect risk of falls  -  [place] fall precautions as indicated by assessment   - Educate patient/family on patient safety including physical limitations  - Instruct patient to call for assistance with activity based on assessment  - Modify environment to reduce risk of injury  - Consider OT/PT consult to assist with strengthening/mobility  Outcome: Progressing     Problem: Prexisting or High Potential for Compromised Skin Integrity  Goal: Skin integrity is maintained or improved  Description: INTERVENTIONS:  - Identify patients at risk for skin breakdown  - Assess and monitor skin integrity  - Assess and monitor nutrition and hydration status  - Monitor labs   - Assess for incontinence   - Turn and reposition patient  - Assist with mobility/ambulation  - Relieve pressure over bony prominences  - Avoid friction and shearing  - Provide appropriate hygiene as needed including keeping skin clean and dry  - Evaluate need for skin moisturizer/barrier cream  - Collaborate with interdisciplinary team   - Patient/family teaching  - Consider wound care consult   Outcome: Progressing     Problem: Nutrition/Hydration-ADULT  Goal: Nutrient/Hydration intake appropriate for improving, restoring or maintaining nutritional needs  Description: Monitor and assess patient's nutrition/hydration status for malnutrition  Collaborate with interdisciplinary team and initiate plan and interventions as ordered  Monitor patient's weight and dietary intake as ordered or per policy  Utilize nutrition screening tool and intervene as necessary  Determine patient's food preferences and provide high-protein, high-caloric foods as appropriate       INTERVENTIONS:  - Monitor oral intake, urinary output, labs, and treatment plans  - Assess nutrition and hydration status and recommend course of action  - Evaluate amount of meals eaten  - Assist patient with eating if necessary   - Allow adequate time for meals  - Recommend/ encourage appropriate diets, oral nutritional supplements, and vitamin/mineral supplements  - Order, calculate, and assess calorie counts as needed  - Recommend, monitor, and adjust tube feedings and TPN/PPN based on assessed needs  - Assess need for intravenous fluids  - Provide specific nutrition/hydration education as appropriate  - Include patient/family/caregiver in decisions related to nutrition  Outcome: Progressing

## 2020-09-01 NOTE — CASE MANAGEMENT
STEPHEN spoke with Tahira at [facility] and she said that pt's daughter needs to call [employer] prior to pt admitting at [facility]  She said that pt's daughter should talk to Raz at "[employer]  STEPHEN called Annie back via telephone to provide the update  She said that she will call [place] today and if unable to reach [employer], she will call tomorrow morning

## 2020-09-01 NOTE — PLAN OF CARE
Problem: PHYSICAL THERAPY ADULT  Goal: Performs mobility at highest level of function for planned discharge setting  See evaluation for individualized goals  Description: Treatment/Interventions: Functional transfer training, LE strengthening/ROM, Therapeutic exercise, Endurance training, Patient/family training, Equipment eval/education, Bed mobility, Gait training, Spoke to nursing, Spoke to case management, OT  Equipment Recommended: Walker(RW)       See flowsheet documentation for full assessment, interventions and recommendations  Outcome: Progressing  Note: Prognosis: Fair  Problem List: Decreased strength, Decreased endurance, Impaired balance, Decreased mobility, Decreased cognition, Impaired judgement, Decreased safety awareness  Assessment: Pt seen for PT treatment session this date with interventions consisting of gait training w/ emphasis on improving pt's ability to ambulate level surfaces x 20ftx2 with 4 directional turns with min A provided by therapist with RW, Therapeutic exercise consisting of: AROM 20 reps B LE in sitting position and therapeutic activity consisting of training: bed mobility, supine<>sit transfers, sit<>stand transfers, static sitting tolerance at EOB for 23 minutes w/ min UE support and vc and tactile cues for static standing posture faciliation  Pt agreeable to PT treatment session upon arrival, pt found supine in bed w/ HOB elevated, in no apparent distress and responsive  In comparison to previous session, pt with improvements in distance ambulated  Post session: pt returned BTB, bed alarm engaged, all needs in reach and RN notified of session findings/recommendations Continue to recommend STR at time of d/c in order to maximize pt's functional independence and safety w/ mobility  Pt continues to be functioning below baseline level, and remains limited 2* factors listed above and including impaired balance and decreased functional activity tolerance   PT will continue to see pt while here in order to address the deficits listed above and provide interventions consistent w/ POC in effort to achieve STGs  Barriers to Discharge: Inaccessible home environment, Decreased caregiver support     PT Discharge Recommendation: [address]     PT - OK to Discharge: Yes(when medically stable to STR)    See flowsheet documentation for full assessment

## 2020-09-01 NOTE — ASSESSMENT & PLAN NOTE
Continue PT/OT rehabilitative endeavors  - case management on board  Will follow-up with daughter and  for placement at [facility]

## 2020-09-01 NOTE — CASE MANAGEMENT
STEPHEN notified by [facility] that pt is medically cleared  STEPHEN called and left  for Sophie Grace at [address]  STEPHEN called Amena Aleman at [phone]  She said that she is looking for a medical update  CM informed her that  will ask physician to call her  However, STEPHEN informed her that pt is medically cleared  CM encouraged her to call [place] to talk to [employer]

## 2020-09-01 NOTE — PHYSICAL THERAPY NOTE
09/01/20 1234   Pain Assessment   Pain Assessment Tool 0-10   Pain Score No Pain   Restrictions/Precautions   Weight Bearing Precautions Per Order No   Other Precautions Bed Alarm;Cognitive;Multiple lines;Hard of hearing; Fall Risk   General   Chart Reviewed Yes   Response to Previous Treatment Patient unable to report, no changes reported from family or staff   Family/Caregiver Present No   Cognition   Overall Cognitive Status Impaired   Arousal/Participation Responsive; Cooperative; Alert   Attention Attends with cues to redirect   Orientation Level Disoriented X4   Memory Decreased recall of biographical information;Decreased short term memory;Decreased recall of recent events   Following Commands Follows multistep commands with increased time or repetition   Comments pt agreeable to PT session   Bed Mobility   Rolling R 5  Supervision   Additional items Assist x 1;Verbal cues; Increased time required   Rolling L 5  Supervision   Additional items Assist x 1; Increased time required; Bedrails;Verbal cues   Supine to Sit 4  Minimal assistance   Additional items Assist x 1;Bedrails; Increased time required;Verbal cues;LE management   Sit to Supine 4  Minimal assistance   Additional items Assist x 1;HOB elevated; Increased time required;Verbal cues   Transfers   Sit to Stand 4  Minimal assistance   Additional items Assist x 1;Bedrails; Increased time required;Verbal cues   Stand to Sit 4  Minimal assistance   Additional items Assist x 1;Bedrails; Increased time required;Verbal cues   Ambulation/Elevation   Gait pattern Decreased foot clearance; Short stride; Step to;Excessively slow   Gait Assistance 4  Minimal assist   Additional items Assist x 1;Verbal cues; Tactile cues   Assistive Device Rolling walker   Distance 20ftx2 with 4 directional turns   Balance   Static Sitting Fair +   Dynamic Sitting Fair   Static Standing Fair -   Dynamic Standing Poor +   Ambulatory Poor +   Endurance Deficit   Endurance Deficit Yes Activity Tolerance   Activity Tolerance Patient limited by fatigue   Exercises   Quad Sets Sitting;20 reps;AROM; Bilateral   Heelslides Sitting;20 reps;AROM; Bilateral   Glute Sets Sitting;20 reps   Hip Abduction Sitting;20 reps;AROM; Bilateral   Hip Adduction Sitting;20 reps;AROM; Bilateral   Knee AROM Long Arc Quad Sitting;20 reps;AROM; Bilateral   Ankle Pumps Sitting;20 reps;AROM; Bilateral   Marching Sitting;20 reps;AROM; Bilateral   Assessment   Prognosis Fair   Problem List Decreased strength;Decreased endurance; Impaired balance;Decreased mobility; Decreased cognition; Impaired judgement;Decreased safety awareness   Assessment Pt seen for PT treatment session this date with interventions consisting of gait training w/ emphasis on improving pt's ability to ambulate level surfaces x 20ftx2 with 4 directional turns with min A provided by therapist with RW, Therapeutic exercise consisting of: AROM 20 reps B LE in sitting position and therapeutic activity consisting of training: bed mobility, supine<>sit transfers, sit<>stand transfers, static sitting tolerance at EOB for 23 minutes w/ min UE support and vc and tactile cues for static standing posture faciliation  Pt agreeable to PT treatment session upon arrival, pt found supine in bed w/ HOB elevated, in no apparent distress and responsive  In comparison to previous session, pt with improvements in distance ambulated  Post session: pt returned BTB, bed alarm engaged, all needs in reach and RN notified of session findings/recommendations Continue to recommend STR at time of d/c in order to maximize pt's functional independence and safety w/ mobility  Pt continues to be functioning below baseline level, and remains limited 2* factors listed above and including impaired balance and decreased functional activity tolerance   PT will continue to see pt while here in order to address the deficits listed above and provide interventions consistent w/ POC in effort to achieve STGs    Barriers to Discharge Inaccessible home environment;Decreased caregiver support   Plan   Treatment/Interventions Functional transfer training;LE strengthening/ROM; Therapeutic exercise; Endurance training;Cognitive reorientation; Bed mobility;Gait training   Progress Slow progress, decreased activity tolerance   PT Frequency   (3-5x/wk)   Recommendation   PT Discharge Recommendation [facility]   Equipment Recommended Walker   PT - OK to Discharge Yes  (when medically stable to STR)   Additional Comments upon conclusion, pt returned to supine in bed with all needs in place

## 2020-09-01 NOTE — ASSESSMENT & PLAN NOTE
Malnutrition Findings:   Malnutrition type: Chronic illness  Degree of Malnutrition: Other severe protein calorie malnutrition(21% wt loss in unknown time frame, BMI <18 5, body fat=severe depletion at temples and triceps, muscle mass= severe depletion at clavicles and temples  treatment= diet/supplements)    BMI Findings:  BMI Classifications: Underweight < 18 5     Body mass index is 12 62 kg/m²  - continue routine feeding, with appropriate caloric balance and protein supplementation

## 2020-09-01 NOTE — PROGRESS NOTES
Progress Note - King Kelley [birth year], 80 y o  female MRN: [id number]    Unit/Bed#: -01 Encounter: [id number]    Primary Care Provider: Randell Rangel MD   Date and time admitted to hospital: 8/29/2020 10:44 AM        * Weakness/ambulatory dysfunction  Assessment & Plan  Continue PT/OT rehabilitative endeavors  - case management on board  Will follow-up for placement at [address] [place] [address]  Failure to thrive in adult  Assessment & Plan  Patient appears to be consuming adequate calories during her hospitalization  Severe protein-calorie malnutrition ([address] )  Assessment & Plan  Malnutrition Findings:   Malnutrition type: Chronic illness  Degree of Malnutrition: Other severe protein calorie malnutrition(21% wt loss in unknown time frame, BMI <18 5, body fat=severe depletion at temples and triceps, muscle mass= severe depletion at clavicles and temples  treatment= diet/supplements)    BMI Findings:  BMI Classifications: Underweight < 18 5     Body mass index is 12 62 kg/m²  - continue routine feeding, with appropriate caloric balance and protein supplementation  Alzheimer's dementia without behavioral disturbance (HCC)  Assessment & Plan  Continue donepezil and sertraline  Diarrhea  Assessment & Plan    - C diff negative  - trend electrolytes  VTE Pharmacologic Prophylaxis:   Pharmacologic: Enoxaparin (Lovenox)  Mechanical VTE Prophylaxis in Place: Yes    Patient Centered Rounds: I have performed bedside rounds with nursing staff today  Discussions with Specialists or Other Care Team Provider:  None  Education and Discussions with Family / Patient:  Patient's daughter to contact [address] [place] [address] tomorrow prior to patient admission  Time Spent for Care: 30 minutes  More than 50% of total time spent on counseling and coordination of care as described above      Current Length of Stay: 3 day(s)    Current Patient Status: Inpatient   Certification Statement: The patient will continue to require additional inpatient hospital stay due to Placement  Discharge Plan / Estimated Discharge Date:  Patient to be placed in short-term rehabilitation facility  /estimated discharge date 2020  Code Status: Level 3 - DNAR and DNI      Subjective:   Patient seen today at the bedside  Patient was having her breakfast at the time of interview  Patient says she is comfortable and has no acute issues  Review of systems unremarkable  Objective:     Vitals:   Temp (24hrs), Av 4 °F (36 9 °C), Min:97 5 °F (36 4 °C), Max:99 4 °F (37 4 °C)    Temp:  [97 5 °F (36 4 °C)-99 4 °F (37 4 °C)] 99 4 °F (37 4 °C)  HR:  [64-94] 94  Resp:  [12-18] 18  BP: ()/(48-56) 143/56  SpO2:  [95 %-97 %] 95 %  Body mass index is 12 62 kg/m²  Input and Output Summary (last 24 hours): Intake/Output Summary (Last 24 hours) at 2020 1808  Last data filed at 2020 1757  Gross per 24 hour   Intake 1014 ml   Output    Net 1014 ml       Physical Exam:     Physical Exam  Vitals signs and nursing note reviewed  Constitutional:       Appearance: Normal appearance  HENT:      Head: Normocephalic and atraumatic  Mouth/Throat:      Mouth: Mucous membranes are moist    Eyes:      Extraocular Movements: Extraocular movements intact  Conjunctiva/sclera: Conjunctivae normal    Neck:      Musculoskeletal: Neck supple  Cardiovascular:      Rate and Rhythm: Normal rate and regular rhythm  Heart sounds: S1 normal and S2 normal    Pulmonary:      Effort: Pulmonary effort is normal       Breath sounds: Normal breath sounds  Abdominal:      Palpations: Abdomen is soft  Musculoskeletal: Normal range of motion  Skin:     General: Skin is warm and dry  Neurological:      Mental Status: She is alert  Mental status is at baseline     Psychiatric:         Mood and Affect: Mood normal          Behavior: Behavior normal          Additional Data:     Labs:    Results from last 7 days   Lab Units 09/01/20  0538   WBC Thousand/uL 11 27*   HEMOGLOBIN g/dL 9 9*   HEMATOCRIT % 33 3*   PLATELETS Thousands/uL 321   NEUTROS PCT % 70   LYMPHS PCT % 15   MONOS PCT % 10   EOS PCT % 4     Results from last 7 days   Lab Units 09/01/20  0538  08/29/20  1100   POTASSIUM mmol/L 4 2   < > 5 0   CHLORIDE mmol/L 103   < > 106   CO2 mmol/L 28   < > 26   BUN mg/dL 15   < > 20   CREATININE mg/dL 0 97   < > 0 97   CALCIUM mg/dL 8 7   < > 8 5   ALK PHOS U/L  --   --  52   ALT U/L  --   --  11*   AST U/L  --   --  22    < > = values in this interval not displayed  * I Have Reviewed All Lab Data Listed Above  * Additional Pertinent Lab Tests Reviewed: All Labs Within Last 24 Hours Reviewed    Imaging:    Imaging Reports Reviewed Today Include:  None  Imaging Personally Reviewed by Myself Includes:  None  Recent Cultures (last 7 days):     Results from last 7 days   Lab Units 08/30/20  1950   C DIFF TOXIN B  Negative       Last 24 Hours Medication List:   Current Facility-Administered Medications   Medication Dose Route Frequency Provider Last Rate    acetaminophen  650 mg Oral Q6H PRN Ayush Roldan MD      amLODIPine  5 mg Oral Daily Ayush Roldan MD      donepezil  10 mg Oral Daily Ayush Roldan MD      enoxaparin  30 mg Subcutaneous Daily Ayush Roldan MD      ipratropium-albuterol  3 mL Nebulization Q4H PRN Ayush Roldan MD      lactobacillus acidophilus-bulgaricus  1 packet Oral TID With Meals Ayush Roldan MD      ondansetron  4 mg Intravenous Q6H PRN Ayush Roldan MD      sertraline  25 mg Oral Daily Ayush Roldan MD      simethicone  80 mg Oral 4x Daily PRN Aysuh Roldan MD          Today, Patient Was Seen By: Stevenson Hebert MD    ** Please Note: Dragon 360 Dictation voice to text software may have been used in the creation of this document   **

## 2020-09-02 VITALS
TEMPERATURE: 98.9 F | DIASTOLIC BLOOD PRESSURE: 42 MMHG | SYSTOLIC BLOOD PRESSURE: 120 MMHG | WEIGHT: 70.11 LBS | HEIGHT: 63 IN | HEART RATE: 85 BPM | RESPIRATION RATE: 16 BRPM | BODY MASS INDEX: 12.42 KG/M2 | OXYGEN SATURATION: 97 %

## 2020-09-02 LAB
ANION GAP SERPL CALCULATED.3IONS-SCNC: 8 MMOL/L (ref 4–13)
BASOPHILS # BLD AUTO: 0.06 THOUSANDS/ΜL (ref 0–0.1)
BASOPHILS NFR BLD AUTO: 1 % (ref 0–1)
BUN SERPL-MCNC: 25 MG/DL (ref 5–25)
CALCIUM SERPL-MCNC: 8.8 MG/DL (ref 8.3–10.1)
CHLORIDE SERPL-SCNC: 103 MMOL/L (ref 100–108)
CO2 SERPL-SCNC: 29 MMOL/L (ref 21–32)
CREAT SERPL-MCNC: 0.95 MG/DL (ref 0.6–1.3)
EOSINOPHIL # BLD AUTO: 0.49 THOUSAND/ΜL (ref 0–0.61)
EOSINOPHIL NFR BLD AUTO: 4 % (ref 0–6)
ERYTHROCYTE [DISTWIDTH] IN BLOOD BY AUTOMATED COUNT: 18.4 % (ref 11.6–15.1)
GFR SERPL CREATININE-BSD FRML MDRD: 53 ML/MIN/1.73SQ M
GLUCOSE SERPL-MCNC: 95 MG/DL (ref 65–140)
HCT VFR BLD AUTO: 33.2 % (ref 34.8–46.1)
HGB BLD-MCNC: 10 G/DL (ref 11.5–15.4)
IMM GRANULOCYTES # BLD AUTO: 0.08 THOUSAND/UL (ref 0–0.2)
IMM GRANULOCYTES NFR BLD AUTO: 1 % (ref 0–2)
LYMPHOCYTES # BLD AUTO: 1.47 THOUSANDS/ΜL (ref 0.6–4.47)
LYMPHOCYTES NFR BLD AUTO: 13 % (ref 14–44)
MCH RBC QN AUTO: 26.7 PG (ref 26.8–34.3)
MCHC RBC AUTO-ENTMCNC: 30.1 G/DL (ref 31.4–37.4)
MCV RBC AUTO: 89 FL (ref 82–98)
MONOCYTES # BLD AUTO: 1.19 THOUSAND/ΜL (ref 0.17–1.22)
MONOCYTES NFR BLD AUTO: 11 % (ref 4–12)
NEUTROPHILS # BLD AUTO: 8.04 THOUSANDS/ΜL (ref 1.85–7.62)
NEUTS SEG NFR BLD AUTO: 70 % (ref 43–75)
NRBC BLD AUTO-RTO: 0 /100 WBCS
PLATELET # BLD AUTO: 318 THOUSANDS/UL (ref 149–390)
PMV BLD AUTO: 9.8 FL (ref 8.9–12.7)
POTASSIUM SERPL-SCNC: 4.6 MMOL/L (ref 3.5–5.3)
PROCALCITONIN SERPL-MCNC: 0.06 NG/ML
RBC # BLD AUTO: 3.74 MILLION/UL (ref 3.81–5.12)
SODIUM SERPL-SCNC: 140 MMOL/L (ref 136–145)
WBC # BLD AUTO: 11.33 THOUSAND/UL (ref 4.31–10.16)

## 2020-09-02 PROCEDURE — 97530 THERAPEUTIC ACTIVITIES: CPT

## 2020-09-02 PROCEDURE — 97110 THERAPEUTIC EXERCISES: CPT

## 2020-09-02 PROCEDURE — 99238 HOSP IP/OBS DSCHRG MGMT 30/<: CPT | Performed by: INTERNAL MEDICINE

## 2020-09-02 PROCEDURE — 97116 GAIT TRAINING THERAPY: CPT

## 2020-09-02 PROCEDURE — 80048 BASIC METABOLIC PNL TOTAL CA: CPT | Performed by: INTERNAL MEDICINE

## 2020-09-02 PROCEDURE — 85025 COMPLETE CBC W/AUTO DIFF WBC: CPT | Performed by: INTERNAL MEDICINE

## 2020-09-02 PROCEDURE — 84145 PROCALCITONIN (PCT): CPT | Performed by: INTERNAL MEDICINE

## 2020-09-02 RX ORDER — SERTRALINE HYDROCHLORIDE 25 MG/1
25 TABLET, FILM COATED ORAL DAILY
Qty: 30 TABLET | Refills: 0 | Status: SHIPPED | OUTPATIENT
Start: 2020-09-03

## 2020-09-02 RX ORDER — AMLODIPINE BESYLATE 5 MG/1
5 TABLET ORAL DAILY
Qty: 30 TABLET | Refills: 0 | Status: SHIPPED | OUTPATIENT
Start: 2020-09-03

## 2020-09-02 RX ORDER — DONEPEZIL HYDROCHLORIDE 10 MG/1
10 TABLET, FILM COATED ORAL DAILY
Qty: 30 TABLET | Refills: 0 | Status: SHIPPED | OUTPATIENT
Start: 2020-09-03

## 2020-09-02 RX ORDER — LACTOBACILLUS ACIDOPHILUS / LACTOBACILLUS BULGARICUS 100 MILLION CFU STRENGTH
1 GRANULES ORAL
Qty: 1 PACKET | Refills: 0 | Status: SHIPPED | OUTPATIENT
Start: 2020-09-02

## 2020-09-02 RX ADMIN — Medication 1 PACKET: at 09:32

## 2020-09-02 RX ADMIN — AMLODIPINE BESYLATE 5 MG: 5 TABLET ORAL at 09:32

## 2020-09-02 RX ADMIN — ENOXAPARIN SODIUM 30 MG: 30 INJECTION SUBCUTANEOUS at 09:32

## 2020-09-02 RX ADMIN — DONEPEZIL HYDROCHLORIDE 10 MG: 5 TABLET ORAL at 09:32

## 2020-09-02 RX ADMIN — SERTRALINE HYDROCHLORIDE 25 MG: 25 TABLET ORAL at 09:32

## 2020-09-02 NOTE — CASE MANAGEMENT
CM notified by Reji Maher at [facility] that [address] can set pt up for transport as pt's daughter communicated with their [employer]  CM contacted [facility], spoke with Franco Valero and scheduled wcv  CM was provided with wcv between 4:30 and 5pm  CM notified Reji Maher via telephone  Also, CM notified Kimberly Pyle via telephone  ARGELIA and RN made aware

## 2020-09-02 NOTE — DISCHARGE INSTRUCTIONS
Weakness   WHAT YOU NEED TO KNOW:   Weakness is a loss of muscle strength  It may be caused by brain, nerve, or muscle problems  Physical and mental conditions such as heart problems, pregnancy, dehydration, or depression may also cause weakness  Reactions to certain drugs can cause weakness  Parts of your body may become weak if you need to wear a cast or splint or have been on bed rest for a long time  DISCHARGE INSTRUCTIONS:   Call 911 for any of the following:   · You have any of the following signs of a stroke:      ¨ Numbness or drooping on one side of your face     ¨ Weakness in an arm or leg    ¨ Confusion or difficulty speaking    ¨ Dizziness, a severe headache, or vision loss    · You lose feeling in your weakened body area  · You have electric shock-like feelings down your arms and legs when you flex or move your neck  · You have sudden or increased trouble speaking, swallowing, or breathing  Seek care immediately if:   · You have severe pain in your back, arms, or legs that worsens  · You have sudden or worsened muscle weakness or loss of movement  · You are not able to control when you urinate or have a bowel movement  Contact your healthcare provider if:   · You feel depressed or anxious  · You have questions or concerns about your condition or care  Manage weakness:   · Use assistive devices as directed  These help protect you from injury  Examples include a walker or cane  Have someone install handrails in your home  These will help you get out of a bathtub or stand up from a toilet  Use a shower chair so you can sit while you shower  Sit down on the toilet or another chair to dry off and put on your clothes  Get help going up and down stairs if your legs are weak  · Go to physical or occupational therapy if directed  A physical therapist can teach you exercises to help strengthen weak muscles   An occupational therapist can show you ways to do your daily activities more easily  For example, light forks and spoons can be easier to use if you have hand weakness  You may also learn ways to organize your household items so you are not moving heavy items  · Balance rest with exercise  Exercise can help increase your muscle strength and energy  Do not exercise for long periods at a time  Take breaks often to rest  Too much exercise can cause muscle strain or make you more tired  Ask your healthcare provider how much exercise is right for you  · Eat a variety of healthy foods  Too much or too little food may cause weakness or tiredness  Ask your healthcare provider what a healthy amount of food is for you  Healthy foods include fruits, vegetables, whole-grain breads, low-fat dairy products, lean meats and fish, nuts, and cooked beans  · Do not smoke  Nicotine and other chemicals in cigarettes and cigars can make your symptoms worse, and can cause lung damage  Ask your healthcare provider for information if you currently smoke and need help to quit  E-cigarettes or smokeless tobacco still contain nicotine  Talk to your healthcare provider before you use these products  · Do not use caffeine, alcohol, or illegal drugs  These may cause muscle twitching, which could lead to worsened weakness  Follow up with your healthcare provider as directed:  Write down your questions so you remember to ask them during your visits  © 2017 [address]  Information is for End User's use only and may not be sold, redistributed or otherwise used for commercial purposes  All illustrations and images included in CareNotes® are the copyrighted property of A [employer] M [employer]  or Daljit Arvizu  The above information is an  only  It is not intended as medical advice for individual conditions or treatments  Talk to your doctor, nurse or pharmacist before following any medical regimen to see if it is safe and effective for you

## 2020-09-02 NOTE — PHYSICAL THERAPY NOTE
09/02/20 0906   Pain Assessment   Pain Assessment Tool 0-10   Pain Score No Pain   Restrictions/Precautions   Weight Bearing Precautions Per Order No   Other Precautions Chair Alarm; Bed Alarm; Fall Risk;Cognitive;Hard of hearing   General   Chart Reviewed Yes   Response to Previous Treatment Patient with no complaints from previous session  Family/Caregiver Present No   Cognition   Overall Cognitive Status Impaired   Arousal/Participation Alert; Cooperative   Attention Attends with cues to redirect   Orientation Level Oriented to person   Memory Decreased recall of recent events   Following Commands Follows one step commands without difficulty   Subjective   Subjective "Im ready to move a bit "   Bed Mobility   Rolling R 5  Supervision   Additional items Assist x 1;HOB elevated; Bedrails; Increased time required;Verbal cues   Rolling L 5  Supervision   Additional items Assist x 1;HOB elevated; Bedrails; Increased time required;Verbal cues   Supine to Sit 5  Supervision   Additional items Assist x 1;HOB elevated; Bedrails; Increased time required;Verbal cues   Transfers   Sit to Stand 4  Minimal assistance   Additional items Assist x 1; Increased time required;Verbal cues   Stand to Sit 4  Minimal assistance   Additional items Assist x 1; Increased time required;Verbal cues;Armrests   Ambulation/Elevation   Gait pattern Improper Weight shift;Decreased foot clearance; Foward flexed; Excessively slow   Gait Assistance 4  Minimal assist   Additional items Assist x 1;Verbal cues; Tactile cues   Assistive Device Rolling walker   Distance 20 ft   Balance   Static Sitting Fair +   Dynamic Sitting Fair   Static Standing Fair -   Dynamic Standing Poor +   Ambulatory Poor +   Endurance Deficit   Endurance Deficit Yes   Activity Tolerance   Activity Tolerance Patient limited by fatigue   Exercises   Quad Sets Sitting;20 reps;AROM; Bilateral   Heelslides Sitting;20 reps;AROM; Bilateral   Glute Sets Sitting;20 reps;AROM; Bilateral   Hip Flexion Sitting;20 reps;AROM; Bilateral   Hip Abduction Sitting;20 reps;AROM; Bilateral   Hip Adduction Sitting;20 reps;AROM; Bilateral   Knee AROM Long Arc Quad Sitting;20 reps;AROM; Bilateral   Ankle Pumps Sitting;20 reps;AROM; Bilateral   Assessment   Prognosis Fair   Problem List Decreased strength;Decreased endurance;Decreased mobility; Impaired balance;Decreased cognition; Impaired judgement;Decreased safety awareness; Impaired hearing   Assessment Pt  found resting in bed but willing to participate in PT treatment  Pt  performed bed mobility tasks including rolling from side to side with S and use of BSR's to assist   Pt  then transfered from supine to sit with S and use of BSR to assist to EOB  Pt  was able to sit at EOB unsupported and then transfered from sit to stand with min Ax1 and ambulated 20 ft to recliner  Pt  performed ther ex as per treatment flow sheet with reports of fatigue upon conclusion  Frequent rest periods were required between activity due to fatigue  O2 levels did remain therapeutic throughout session  Pt  denied pain when asked  Pt  was positioned for comfort and left with all needs in reach  Pt  would benefit from STR to to help improve her functional mobility and increase her activity tolerance  Continue current PCO and progress as tolerated  Goals   PT Treatment Day 2   Plan   Treatment/Interventions Functional transfer training;LE strengthening/ROM; Therapeutic exercise; Endurance training;Cognitive reorientation;Patient/family training;Bed mobility;Gait training   Progress Slow progress, decreased activity tolerance   PT Frequency   (3-5x/wk)   Recommendation   PT Discharge Recommendation Post-Acute Rehabilitation Services   Equipment Recommended Walker   PT - OK to Discharge Yes   Additional Comments when medically stable   Vini Costello PTA

## 2020-09-02 NOTE — DISCHARGE INSTR - AVS FIRST PAGE
Please continue to provide protein supplementation and adequate caloric intake with regular balanced meals on a daily basis

## 2020-09-02 NOTE — PLAN OF CARE
Problem: PHYSICAL THERAPY ADULT  Goal: Performs mobility at highest level of function for planned discharge setting  See evaluation for individualized goals  Description: Treatment/Interventions: Functional transfer training, LE strengthening/ROM, Therapeutic exercise, Endurance training, Patient/family training, Equipment eval/education, Bed mobility, Gait training, Spoke to nursing, Spoke to case management, OT  Equipment Recommended: Walker(RW)       See flowsheet documentation for full assessment, interventions and recommendations  Outcome: Progressing  Note: Prognosis: Fair  Problem List: Decreased strength, Decreased endurance, Decreased mobility, Impaired balance, Decreased cognition, Impaired judgement, Decreased safety awareness, Impaired hearing  Assessment: Pt  found resting in bed but willing to participate in PT treatment  Pt  performed bed mobility tasks including rolling from side to side with S and use of BSR's to assist   Pt  then transfered from supine to sit with S and use of BSR to assist to EOB  Pt  was able to sit at EOB unsupported and then transfered from sit to stand with min Ax1 and ambulated 20 ft to recliner  Pt  performed ther ex as per treatment flow sheet with reports of fatigue upon conclusion  Frequent rest periods were required between activity due to fatigue  O2 levels did remain therapeutic throughout session  Pt  denied pain when asked  Pt  was positioned for comfort and left with all needs in reach  Pt  would benefit from STR to to help improve her functional mobility and increase her activity tolerance  Continue current PCO and progress as tolerated  Barriers to Discharge: Inaccessible home environment, Decreased caregiver support     PT Discharge Recommendation: [address]     PT - OK to Discharge: Yes    See flowsheet documentation for full assessment     Nisreen Yomi, PTA

## 2020-09-02 NOTE — CASE MANAGEMENT
CM spoke with [facility] via telephone regarding dcp  She stated that she spoke with Jocelyn Munroe and then left a vm with [facility] business office  She expressed some concerns about pt going MA pending to [place] so she was thinking about bringing pt home  However, she said that pt going to [place] is the best option at this time  She said that she will let CM know when she has spoken with [facility] Business Office so that CM work on transport  CM spoke with Jocelyn Munroe right after and provided her with the report  She said that she will let the Business Office to talk with [facility] right away so that pt can admit to [address] today

## 2020-09-02 NOTE — DISCHARGE SUMMARY
PCP: Bebe Butt MD  Admission Date: 8/29/2020  Discharge Date: 09/02/20    Disposition:     [address] at [facility] AT [facility]  Reason for Admission:  Adult failure to thrive  Severe protein calorie malnutrition  Consultations During Hospital Stay:  · PT/OT assessment intervention appreciated  Procedures Performed:     · None  Primary diagnosis:    Adult failure to thrive  Secondary diagnosis:  Severe protein calorie malnutrition       Significant Findings / Test Results:     · None  Incidental Findings:   · None  Test Results Pending at Discharge (will require follow up): · None  Outpatient Tests Requested:  · None  Complications:  None  Hospital Course: Franklyn Srivastava is a 80 y o  female patient with a medical history of dementia, who originally presented to the hospital on 8/29/2020 due to generalized severe weakness to the point were ambulation been limited  Per patient's daughter was primary caregiver, patient has been experiencing intermittent weakness, and progressive fecal incontinence  Patient was noted on admission to have significantly reduced BMI, with evidence of malnutrition presents such as low muscle mass and sunken supraclavicular fossae on physical exam, in addition to hypoalbuminemia and normocytic anemia  Patient ambulates with the use of a walker baseline, in addition to manual wheelchair  Patient was hospitalized for nutritional supplementation/maintenance with plan for appropriate placement in skilled nursing facility for care  Patient underwent a generally uncomplicated course during her hospitalization, with high-protein/high-calorie dietary intake and dietary triggers no supplements provided  Patient did experience a period of diarrhea, however C diff PCR was negative, and resolve spontaneously with solid stools formed prior to discharge    Patient was discharged to [address] [place] [address] and rehabilitation center  Condition at Discharge: good     Discharge Day Visit / Exam:     Subjective:  Patient interviewed by author by the bedside on the day of discharge  Patient indicates that she has been eating regular meals, confirmed by nursing staff  A complete review of systems was unremarkable  Vitals: Blood Pressure: 133/50 (09/02/20 0755)  Pulse: 78 (09/02/20 0755)  Temperature: 98 3 °F (36 8 °C) (09/02/20 0755)  Temp Source: Oral (08/31/20 1454)  Respirations: 18 (09/02/20 0755)  Height: 5' 2 5" (158 8 cm) (08/30/20 1217)  Weight - Scale: 31 8 kg (70 lb 1 7 oz) (09/02/20 0600)  SpO2: 97 % (09/02/20 0755)  Exam:   Physical Exam  Vitals signs reviewed  Constitutional:       Appearance: She is underweight  HENT:      Head: Atraumatic  Mouth/Throat:      Mouth: Mucous membranes are moist    Eyes:      Extraocular Movements: Extraocular movements intact  Conjunctiva/sclera: Conjunctivae normal    Cardiovascular:      Rate and Rhythm: Normal rate and regular rhythm  Heart sounds: S1 normal and S2 normal    Pulmonary:      Effort: Pulmonary effort is normal       Breath sounds: Normal breath sounds  Abdominal:      Palpations: Abdomen is soft  Skin:     General: Skin is warm and dry  Neurological:      Mental Status: She is alert  Mental status is at baseline  Psychiatric:         Mood and Affect: Mood normal          Behavior: Behavior normal        Discussion with Family:  Patient's daughter involved in liason with [address] [place] [address] for placement of patient  Discharge instructions/Information to patient and family:   See after visit summary for information provided to patient and family  Provisions for Follow-Up Care:  See after visit summary for information related to follow-up care and any pertinent home health orders  Planned Readmission:  None  Discharge Medications:  See after visit summary for reconciled discharge medications provided to patient and family  ** Please Note: This note has been constructed using a voice recognition system **

## 2020-09-03 ENCOUNTER — TRANSITIONAL CARE MANAGEMENT (OUTPATIENT)
Dept: FAMILY MEDICINE CLINIC | Facility: MEDICAL CENTER | Age: 85
End: 2020-09-03

## 2021-08-13 NOTE — PLAN OF CARE
Problem: Potential for Falls  Goal: Patient will remain free of falls  Description  INTERVENTIONS:  - Assess patient frequently for physical needs  -  Identify cognitive and physical deficits and behaviors that affect risk of falls    -  [place] fall precautions as indicated by assessment   - Educate patient/family on patient safety including physical limitations  - Instruct patient to call for assistance with activity based on assessment  - Modify environment to reduce risk of injury  - Consider OT/PT consult to assist with strengthening/mobility  Outcome: Progressing     Problem: GASTROINTESTINAL - ADULT  Goal: Minimal or absence of nausea and/or vomiting  Description  INTERVENTIONS:  - Administer IV fluids if ordered to ensure adequate hydration  - Maintain NPO status until nausea and vomiting are resolved  - Nasogastric tube if ordered  - Administer ordered antiemetic medications as needed  - Provide nonpharmacologic comfort measures as appropriate  - Advance diet as tolerated, if ordered  - Consider nutrition services referral to assist patient with adequate nutrition and appropriate food choices  Outcome: Progressing  Goal: Maintains or returns to baseline bowel function  Description  INTERVENTIONS:  - Assess bowel function  - Encourage oral fluids to ensure adequate hydration  - Administer IV fluids if ordered to ensure adequate hydration  - Administer ordered medications as needed  - Encourage mobilization and activity  - Consider nutritional services referral to assist patient with adequate nutrition and appropriate food choices  Outcome: Progressing  Goal: Maintains adequate nutritional intake  Description  INTERVENTIONS:  - Monitor percentage of each meal consumed  - Identify factors contributing to decreased intake, treat as appropriate  - Assist with meals as needed  - Monitor I&O, weight, and lab values if indicated  - Obtain nutrition services referral as needed  Outcome: Progressing     Problem: Nutrition/Hydration-ADULT  Goal: Nutrient/Hydration intake appropriate for improving, restoring or maintaining nutritional needs  Description  Monitor and assess patient's nutrition/hydration status for malnutrition  Collaborate with interdisciplinary team and initiate plan and interventions as ordered  Monitor patient's weight and dietary intake as ordered or per policy  Utilize nutrition screening tool and intervene as necessary  Determine patient's food preferences and provide high-protein, high-caloric foods as appropriate       INTERVENTIONS:  - Monitor oral intake, urinary output, labs, and treatment plans  - Assess nutrition and hydration status and recommend course of action  - Evaluate amount of meals eaten  - Assist patient with eating if necessary   - Allow adequate time for meals  - Recommend/ encourage appropriate diets, oral nutritional supplements, and vitamin/mineral supplements  - Order, calculate, and assess calorie counts as needed  - Recommend, monitor, and adjust tube feedings and TPN/PPN based on assessed needs  - Assess need for intravenous fluids  - Provide specific nutrition/hydration education as appropriate  - Include patient/family/caregiver in decisions related to nutrition  Outcome: Progressing Walk in

## 2024-05-28 NOTE — TELEPHONE ENCOUNTER
Need to check with Daughter Ivonne Spring as we discussed Alcira Parkinson(Attending)